# Patient Record
Sex: FEMALE | Race: WHITE | URBAN - METROPOLITAN AREA
[De-identification: names, ages, dates, MRNs, and addresses within clinical notes are randomized per-mention and may not be internally consistent; named-entity substitution may affect disease eponyms.]

---

## 2017-01-06 ENCOUNTER — OFFICE VISIT (OUTPATIENT)
Dept: FAMILY MEDICINE | Facility: CLINIC | Age: 67
End: 2017-01-06

## 2017-01-06 VITALS
OXYGEN SATURATION: 100 % | HEART RATE: 87 BPM | SYSTOLIC BLOOD PRESSURE: 128 MMHG | DIASTOLIC BLOOD PRESSURE: 76 MMHG | WEIGHT: 164 LBS | HEIGHT: 67 IN | BODY MASS INDEX: 25.74 KG/M2

## 2017-01-06 DIAGNOSIS — E78.5 HYPERLIPIDEMIA LDL GOAL <160: ICD-10-CM

## 2017-01-06 DIAGNOSIS — Z12.11 SCREENING FOR COLON CANCER: ICD-10-CM

## 2017-01-06 DIAGNOSIS — Z13.820 SCREENING FOR OSTEOPOROSIS: ICD-10-CM

## 2017-01-06 DIAGNOSIS — Z00.00 PREVENTATIVE HEALTH CARE: Primary | ICD-10-CM

## 2017-01-06 DIAGNOSIS — Z12.39 SCREENING FOR BREAST CANCER: ICD-10-CM

## 2017-01-06 DIAGNOSIS — Z11.59 NEED FOR HEPATITIS C SCREENING TEST: ICD-10-CM

## 2017-01-06 DIAGNOSIS — R87.610 ATYPICAL SQUAMOUS CELLS OF UNDETERMINED SIGNIFICANCE (ASCUS) ON PAPANICOLAOU SMEAR OF CERVIX: ICD-10-CM

## 2017-01-06 ASSESSMENT — PAIN SCALES - GENERAL: PAINLEVEL: NO PAIN (0)

## 2017-01-06 NOTE — NURSING NOTE
"    Chief Complaint   Patient presents with     Physical     66 year old      Blood pressure 128/76, pulse 87, height 5' 6.73\" (169.5 cm), weight 164 lb (74.39 kg), SpO2 100 %, not currently breastfeeding. Body mass index is 25.89 kg/(m^2).    Wt Readings from Last 2 Encounters:   01/06/17 164 lb (74.39 kg)   04/08/13 193 lb (87.544 kg)     BP Readings from Last 3 Encounters:   01/06/17 128/76   04/08/13 123/76   03/28/13 122/78       Patient Active Problem List   Diagnosis     Varicose veins     Snoring       Current Outpatient Prescriptions   Medication Sig Dispense Refill     Cholecalciferol (VITAMIN D PO) Take  by mouth. 5000 IU's daily       ibuprofen (ADVIL,MOTRIN) 800 MG tablet Take 1 tablet by mouth every 8 hours as needed for pain. 90 tablet 1       Social History   Substance Use Topics     Smoking status: Never Smoker      Smokeless tobacco: Never Used     Alcohol Use: Yes      Comment: 3 per week         Health Maintenance Due   Topic Date Due     ADVANCE DIRECTIVE PLANNING Q5 YRS (NO INBASKET)  04/11/1968     HEPATITIS C SCREENING  04/11/1968     MAMMO SCREEN Q2 YR (SYSTEM ASSIGNED)  04/11/1990     COLON CANCER SCREEN (SYSTEM ASSIGNED)  04/11/2000     FALL RISK ASSESSMENT  04/11/2015     DEXA SCAN SCREENING (SYSTEM ASSIGNED)  04/11/2015     PNEUMOCOCCAL (1 of 2 - PCV13) 04/11/2015     INFLUENZA VACCINE (SYSTEM ASSIGNED)  09/01/2016       CALEB MARTI CMA  January 6, 2017 2:57 PM      "

## 2017-01-06 NOTE — PATIENT INSTRUCTIONS
CALCIUM    Recommendations:  Teenagers, men and premenopausal women: 1200 mg/day  Pregnant and Lactating women: 1500 mg/day  Postmenopausal women on estrogen: 1200mg/day  Postmenopausal women not on estrogen: 1500 mg/day    If you are not eating dairy products you also need 1000 IU of vitamin D per day which can be obtained in either a multivitamin or in some of the Calcium tablets.    Dairy sources  Milk-  cow's              8 oz            300 mg  [place] milk             8 oz            450mg  Yogurt                      6 oz            300mg  Hard cheese                     1.5 oz          300 mg  Cottage cheese                  2 cup           300 mg  Low fat dairy sources are recommended    Non Dairy sources  OJ with calcium               1 cup           300mg  Total cereal                     1 cup           1000mg    Supplements:  Tums EX                         300 mg  Tums Ultra                      400 mg  Caltrate 600                    600 mg  Oscal                           500 mg  Oscal/D                         500 mg plus vitamin D  Viactive chews                  500mg each  Women's Formula Multivitamin    450 mg      Shingles vaccine  Please call you insurance carrier to see if shingles vaccine is covered.  If so, then ask about where to go to get the vaccine-pharmacy vs clinic.

## 2017-01-06 NOTE — MR AVS SNAPSHOT
After Visit Summary   1/6/2017    Rosa Larson    MRN: [id number]           Patient Information     Date Of Birth          1950        Visit Information        Provider Department      1/6/2017 3:00 PM Thi Harvey MD [facility]        Today's Diagnoses     Preventative health care    -  1     Screening for breast cancer         Need for hepatitis C screening test         Screening for colon cancer         Screening for osteoporosis         Atypical squamous cells of undetermined significance (ASCUS) on Papanicolaou smear of cervix         Hyperlipidemia LDL goal <160           Care Instructions    CALCIUM    Recommendations:  Teenagers, men and premenopausal women: 1200 mg/day  Pregnant and Lactating women: 1500 mg/day  Postmenopausal women on estrogen: 1200mg/day  Postmenopausal women not on estrogen: 1500 mg/day    If you are not eating dairy products you also need 1000 IU of vitamin D per day which can be obtained in either a multivitamin or in some of the Calcium tablets.    Dairy sources  Milk-  cow's              8 oz            300 mg  [place] milk             8 oz            450mg  Yogurt                      6 oz            300mg  Hard cheese                     1.5 oz          300 mg  Cottage cheese                  2 cup           300 mg  Low fat dairy sources are recommended    Non Dairy sources  OJ with calcium               1 cup           300mg  Total cereal                     1 cup           1000mg    Supplements:  Tums EX                         300 mg  Tums Ultra                      400 mg  Caltrate 600                    600 mg  Oscal                           500 mg  Oscal/D                         500 mg plus vitamin D  Viactive chews                  500mg each  Women's Formula Multivitamin    450 mg      Shingles vaccine  Please call you insurance carrier to see if shingles vaccine is covered.  If so, then ask about where to go to get the  vaccine-pharmacy vs clinic.          Follow-ups after your visit        Additional Services     GASTROENTEROLOGY ADULT REFERRAL +/- PROCEDURE       Your provider has referred you to Gastroenterology Services.    English    Procedure/Referral: PROCEDURE ONLY - COLONOSCOPY - Reason for procedure: Screening  [facility]: Gastroenterology Clinic [facility] [phone]   [email or web address] . Please be aware that coverage of these services is subject to the terms and limitations of your health insurance plan.  Call member services at your health plan with any benefit or coverage questions.  Any procedures must be performed at a [facility] facility OR coordinated by your clinic's referral office.    Please bring the following with you to your appointment:    (1) Any X-Rays, CTs or MRIs which have been performed.  Contact the facility where they were done to arrange for  prior to your scheduled appointment.    (2) List of current medications   (3) This referral request   (4) Any documents/labs given to you for this referral                  Future tests that were ordered for you today     Open Future Orders        Priority Expected Expires Ordered    Mammogram - routine screening Routine  12/27/2017 1/6/2017    Hepatitis C Screen Reflex to HCV RNA Quant and Genotype Routine 1/7/2017 1/6/2018 1/6/2017    Dexa hip/pelvis/spine* Routine  12/27/2017 1/6/2017    Lipid Panel ([place]) Routine 1/7/2017 1/6/2018 1/6/2017            Who to contact     Please call your clinic at [phone] to:    Ask questions about your health    Make or cancel appointments    Discuss your medicines    Learn about your test results    Speak to your doctor   If you have compliments or concerns about an experience at your clinic, or if you wish to file a complaint, please contact [facility] Patient Relations at [phone] or email us at [email or web address]    "      Additional Information About Your Visit        "[employer]"hart Information     [employer] gives you secure access to your electronic health record. If you see a primary care provider, you can also send messages to your care team and make appointments. If you have questions, please call your primary care clinic.  If you do not have a primary care provider, please call [phone] and they will assist you.      [employer] is an electronic gateway that provides easy, online access to your medical records. With [employer], you can request a clinic appointment, read your test results, renew a prescription or communicate with your care team.     To access your existing account, please contact your [facility] or call [phone] for assistance.        Care EveryWhere ID     This is your Care EveryWhere ID. This could be used by other organizations to access your [place] medical records  [id number]        Your Vitals Were     Pulse Height BMI (Body Mass Index) Pulse Oximetry          87 5' 6.73\" (169.5 cm) 25.89 kg/m2 100%         Blood Pressure from Last 3 Encounters:   01/06/17 128/76   04/08/13 123/76   03/28/13 122/78    Weight from Last 3 Encounters:   01/06/17 164 lb (74.39 kg)   04/08/13 193 lb (87.544 kg)   03/28/13 193 lb (87.544 kg)              We Performed the Following     GASTROENTEROLOGY ADULT REFERRAL +/- PROCEDURE     Pap imaged thin layer diagnostic with HPV (select HPV order below)        Primary Care Provider Office Phone # Fax #    Thi Harvey [phone]       [address] [address]        Thank you!     Thank you for choosing [facility]  for your care. Our goal is always to provide you with excellent care. Hearing back from our patients is one way we can continue to improve our services. Please take a few minutes to complete the written survey that you may receive in the mail after your visit with us. Thank " you!             Your Updated Medication List - Protect others around you: Learn how to safely use, store and throw away your medicines at www.disposemymeds.org.      Notice  As of 1/6/2017  3:57 PM    You have not been prescribed any medications.

## 2017-01-06 NOTE — PROGRESS NOTES
Rosa Larson is here for a general check up. She is not fasting. She would like to return for fasting labs. . She is up to date on eye exams and dental visits. Wears seat belt-yes. Bike helmet- na.   Concerns today:    GREG Lee is a healthy 65 yo woman, here for a check up.  She has a history of hyperlipidemia but is not currently on medications, however she has lost a lot of weight since her last visit. She is due for pap, mammogram, DEXA and colonoscopy. She is due for Prevnar vaccine but will return for that. Will also return for fasting labs.     Diet: healthy eater, legumes, fruits, veggies, chicken  She has been losing weight using Weight watchers  Wt Readings from Last 2 Encounters:   01/06/17 164 lb (74.39 kg)   04/08/13 193 lb (87.544 kg)     Osteoarthritis  She is doing much better after having lost weight.     Varicose veins  Rosa had surgery for varicose veins about 20 yrs ago. With prolonged standing, her legs feel full. She is interested in referral to [facility].     Hx of abnormal pap  Rosa had ASCUS on pap in 2013, HPV also positive then. She was referred for colposcopy. 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 She denies current spotting.     Advance directive: yes, she will bring in  Hearing concerns: no concerns  Fall Risk: none  Independent at home: yes  Safe : yes  Memory concerns: none    Six Item Cognitive Impairment Test   (6CIT):      What year is it?                               Correct - 0 points    What month is it?                               Correct - 0 points      Give the patient an address to remember with five components:   Prakash Vuong ( first and last name - 2 components)   [address]  (number and name of street - 2 components)   [place] ( city - 1 component)      About what time is it (within the hour)? Correct - 0 points    Count backwards from 20 to 1:   Correct - 0  "points    Say the months of the year in reverse: Correct - 0 points    Repeat the address phrase:   Correct - 0 points    Total 6CIT Score:      0/28    Interpretation: The 6CIT uses an inverse score and questions are weighted to produce a total out of 28. Scores of 0-7 are considered normal and 8 or more significant.    Advantages The test has high sensitivity without compromising specificity even in mild dementia. It is easy to translate linguistically and culturally.  Disadvantages The main disadvantage is in the scoring and weighting of the test, which is initially confusing, however computer models have simplified this greatly.    Probability Statistics: At the 7/8 cut off: Overall figures sensitivity 90% specificity 100%, in mild dementia sensitivity = 78% , specificity = 100%    Copyright 2000 [facility], [facility], . Courtesy of Dr. Racihd Gilman        Health Maintenance   Topic Date Due     ADVANCE DIRECTIVE PLANNING Q5 YRS (NO INBASKET)  04/11/1968     HEPATITIS C SCREENING  04/11/1968     MAMMO SCREEN Q2 YR (SYSTEM ASSIGNED)  04/11/1990     COLON CANCER SCREEN (SYSTEM ASSIGNED)  04/11/2000     FALL RISK ASSESSMENT  04/11/2015     DEXA SCAN SCREENING (SYSTEM ASSIGNED)  04/11/2015     PNEUMOCOCCAL (1 of 2 - PCV13) 04/11/2015     INFLUENZA VACCINE (SYSTEM ASSIGNED)  09/01/2016     LIPID SCREEN Q5 YR FEMALE (SYSTEM ASSIGNED)  03/05/2018     TETANUS IMMUNIZATION (SYSTEM ASSIGNED)  01/11/2023         Patient Active Problem List   Diagnosis     Varicose veins     Snoring       Past Surgical History   Procedure Laterality Date     Strip vein  1992       Family History   Problem Relation Age of Onset     HEART DISEASE Mother 95     Hypertension Father        Social  , spouse Zev  No children  Works in commercial design    HABITS:  Tob: none   ETOH: 2/week  Calcium: 1/day, no osteoporosis  Previous DEXA \"negative\" -last checked 12 yr ago  Caffeine: 4 decaf/day  Exercise: 3x per " "week    OB/GYN HISTORY:  LMP: menopause age 55, no HRT, no vaginal bleeding  Hx abnormal pap?  ASCUS with +HPV in 2013  STD hx?  none  Vasomotor sx:  none  G 0   P 0   A 0  Self Breast exam:  yes    Current Outpatient Prescriptions   Medication Sig Dispense Refill     Cholecalciferol (VITAMIN D PO) Take  by mouth. 5000 IU's daily       ibuprofen (ADVIL,MOTRIN) 800 MG tablet Take 1 tablet by mouth every 8 hours as needed for pain. 90 tablet 1     No Known Allergies      ROS  CONSTITUTIONAL:NEGATIVE for fever, chills, concerted weight loss  INTEGUMENTARY/SKIN: NEGATIVE for worrisome rashes, moles or lesions  EYES: NEGATIVE for vision changes or irritation  ENT/MOUTH: NEGATIVE for ear, mouth and throat problems  RESP:NEGATIVE for significant cough or SOB  BREAST: NEGATIVE for masses, tenderness or discharge  CV: NEGATIVE for chest pain, palpitations, [place], orthopnea, PND  or peripheral edema  GI: NEGATIVE for nausea, abdominal pain, heartburn, or change in bowel habits  :NEGATIVE for frequency, dysuria, or hematuria  MUSCULOSKELETAL:NEGATIVE for significant arthralgias or myalgia, some osteoarthritis  NEURO: NEGATIVE for weakness, dizziness or paresthesias  ENDOCRINE: NEGATIVE for polyuria/dipsia,  temperature intolerance, skin/hair changes  HEME/ALLERGY/IMMUNE: NEGATIVE for bleeding problems  PSYCHIATRIC: NEGATIVE for changes in mood or affect    EXAM  /76 mmHg  Pulse 87  Ht 5' 6.73\" (169.5 cm)  Wt 164 lb (74.39 kg)  BMI 25.89 kg/m2  SpO2 100%  GENERAL APPEARANCE: Alert, pleasant, NAD  EYES: PERRL, EOMI, conjunctiva clear  HENT: TM normal bilaterally. Nose and mouth without lesions  NECK: no adenopathy, thyroid normal to palpation  RESP: lungs clear to auscultation bilaterally,  BREAST: normal without masses, no tenderness or nipple discharge and no palpable  axillary masses or adenopathy   CV: regular rate and rhythm, normal S1 S2, no murmur, no carotid bruits  ABDOMEN: soft, nontender, without HSM or " masses. Bowel sounds normal  : External genitalia without lesions,tissues are atrophic, pale. Cervix pale but pap easily obtained, no abnormal discharge, bimanual exam without adnexal masses or tenderness, uterus non enlarged  RECTAL EXAM: not done   MS: extremities normal- no gross deformities noted, no tender, hot or swollen joints.    SKIN: no suspicious lesions or rashes  NEURO: Normal strength and tone, sensory exam grossly normal, DTR normoreflexive in upper and lower extremities  PSYCH: mentation appears normal. and affect normal/bright.  EXT: no peripheral edema, pedal pulses palpable. Varicose veins noted at lower extremities, bilaterally    Assessment:  (Z00.00) Preventative health care  (primary encounter diagnosis)  Comment: healthy woman  Plan: Hepatitis C Screen Reflex to HCV RNA Quant and         Genotype, GASTROENTEROLOGY ADULT REFERRAL +/-         PROCEDURE, Dexa hip/pelvis/spine*, Lipid Panel         ([place]), HPV High Risk Types DNA Cervical        Anticipatory guidance given today regarding diet, exercise, calcium intake.    (Z12.39) Screening for breast cancer  Comment: due for routine mammogram, normal exam  Plan: Mammogram - routine screening        Refer to breast center    (Z11.59) Need for hepatitis C screening test  Comment: routine screening  Plan: Hepatitis C Screen Reflex to HCV RNA Quant and         Genotype    (Z12.11) Screening for colon cancer  Comment: due for routine screening  Plan: GASTROENTEROLOGY ADULT REFERRAL +/- PROCEDURE        Referral is given    (Z13.820) Screening for osteoporosis  Comment: due for DEXA  Plan: Dexa hip/pelvis/spine*        Discussed adequate calcium and D intake    (R87.610) Atypical squamous cells of undetermined significance (ASCUS) on Papanicolaou smear of cervix  Comment: history of abnormal pap  Plan: Pap imaged thin layer diagnostic with HPV         (select HPV order below)        Await test results.   Addendum: she has normal pap but + high  risk (other ) HPV  Recommendations are to repeat pap and HPV testing in one year.    (E78.5) Hyperlipidemia LDL goal <160  Comment: she is not fasting today  Plan: will return for labs in the next week.    Thi Harvey MD  Internal Medicine/Pediatrics

## 2017-01-12 ENCOUNTER — ALLIED HEALTH/NURSE VISIT (OUTPATIENT)
Dept: FAMILY MEDICINE | Facility: CLINIC | Age: 67
End: 2017-01-12

## 2017-01-12 DIAGNOSIS — Z11.59 NEED FOR HEPATITIS C SCREENING TEST: ICD-10-CM

## 2017-01-12 DIAGNOSIS — Z23 IMMUNIZATION DUE: Primary | ICD-10-CM

## 2017-01-12 DIAGNOSIS — Z00.00 PREVENTATIVE HEALTH CARE: ICD-10-CM

## 2017-01-12 LAB
CHOLEST SERPL-MCNC: 222 MG/DL
COPATH REPORT: NORMAL
HCV AB SERPL QL IA: NORMAL
HDLC SERPL-MCNC: 71 MG/DL
LDLC SERPL CALC-MCNC: 132 MG/DL
NONHDLC SERPL-MCNC: 151 MG/DL
PAP: NORMAL
TRIGL SERPL-MCNC: 96 MG/DL

## 2017-01-13 LAB
FINAL DIAGNOSIS: ABNORMAL
HPV HR 12 DNA CVX QL NAA+PROBE: POSITIVE
HPV16 DNA SPEC QL NAA+PROBE: NEGATIVE
HPV18 DNA SPEC QL NAA+PROBE: NEGATIVE
SPECIMEN DESCRIPTION: ABNORMAL

## 2017-01-19 ENCOUNTER — ALLIED HEALTH/NURSE VISIT (OUTPATIENT)
Dept: FAMILY MEDICINE | Facility: CLINIC | Age: 67
End: 2017-01-19

## 2017-01-19 DIAGNOSIS — Z23 IMMUNIZATION DUE: Primary | ICD-10-CM

## 2017-01-19 NOTE — NURSING NOTE
Screening Questionnaire for Adult Immunization    Are you sick today?   No   Do you have allergies to medications, food, a vaccine component or latex?   No   Have you ever had a serious reaction after receiving a vaccination?   No   Do you have a long-term health problem with heart disease, lung disease, asthma, kidney disease, metabolic disease (e.g. diabetes), anemia, or other blood disorder?   No   Do you have cancer, leukemia, HIV/AIDS, or any other immune system problem?   No   In the past 3 months, have you taken medications that affect  your immune system, such as prednisone, other steroids, or anticancer drugs; drugs for the treatment of rheumatoid arthritis, Crohn s disease, or psoriasis; or have you had radiation treatments?   No   Have you had a seizure, or a brain or other nervous system problem?   No   During the past year, have you received a transfusion of blood or blood     products, or been given immune (gamma) globulin or antiviral drug?   No   For women: Are you pregnant or is there a chance you could become        pregnant during the next month?   No   Have you received any vaccinations in the past 4 weeks?   No     Immunization questionnaire answers were all negative.          Screening performed by CALEB MARTI on 1/19/2017 at 11:39 AM.

## 2017-04-10 ENCOUNTER — TRANSFERRED RECORDS (OUTPATIENT)
Dept: HEALTH INFORMATION MANAGEMENT | Facility: CLINIC | Age: 67
End: 2017-04-10

## 2017-08-29 DIAGNOSIS — M17.12 ARTHRITIS OF LEFT KNEE: Primary | ICD-10-CM

## 2017-12-26 ENCOUNTER — TRANSFERRED RECORDS (OUTPATIENT)
Dept: HEALTH INFORMATION MANAGEMENT | Facility: CLINIC | Age: 67
End: 2017-12-26

## 2018-02-10 ENCOUNTER — HEALTH MAINTENANCE LETTER (OUTPATIENT)
Age: 68
End: 2018-02-10

## 2018-03-10 ENCOUNTER — HEALTH MAINTENANCE LETTER (OUTPATIENT)
Age: 68
End: 2018-03-10

## 2018-03-21 ENCOUNTER — TRANSFERRED RECORDS (OUTPATIENT)
Dept: HEALTH INFORMATION MANAGEMENT | Facility: CLINIC | Age: 68
End: 2018-03-21

## 2018-03-28 NOTE — PROGRESS NOTES
[facility]  [address], [address]  [place] [address]  [phone]  Dept: [phone]    PRE-OP EVALUATION:  Today's date: 2018    Rosa Larson (: 1950) presents for pre-operative evaluation assessment as requested by Dr. Eliud Andre.  She requires evaluation and anesthesia risk assessment prior to undergoing surgery/procedure for treatment of Left knee pain. Procedure: Total left knee replacement .    Proposed Surgery/ Procedure: Left Total Knee replacement  Date of Surgery/ Procedure: 2018  Time of Surgery/ Procedure: [facility]  Hospital/Surgical Facility: [facility]  Fax number for surgical facility: [phone]  Primary Physician: Thi Harvey  Type of Anesthesia Anticipated: to be determined    Patient has a Health Care Directive or Living Will:  NO    1. NO - Do you have a history of heart attack, stroke, stent, bypass or surgery on an artery in the head, neck, heart or legs?  2. NO - Do you ever have any pain or discomfort in your chest?  3. NO - Do you have a history of  Heart Failure?  4. NO - Are you troubled by shortness of breath when: walking on the level, up a slight hill or at night?  5. NO - Do you currently have a cold, bronchitis or other respiratory infection?  6. NO - Do you have a cough, shortness of breath or wheezing?  7. NO - Do you sometimes get pains in the calves of your legs when you walk?  8. Yes - Do you or anyone in your family have previous history of blood clots? mother  9. NO - Do you or does anyone in your family have a serious bleeding problem such as prolonged bleeding following surgeries or cuts?  10. NO - Have you ever had problems with anemia or been told to take iron pills?  11. NO - Have you had any abnormal blood loss such as black, tarry or bloody stools, or abnormal vaginal bleeding?  12. NO - Have you ever had a blood transfusion?  13. Yes - Have you or any of your relatives ever had  "problems with anesthesia? Sister gets nauseated  14. NO - Do you have sleep apnea, excessive snoring or daytime drowsiness?  15. NO - Do you have any prosthetic heart valves?  16. NO - Do you have prosthetic joints?  17. NO - Is there any chance that you may be pregnant?      HPI:   Preoperative assessment  Rosa Larson is a 66 yo woman here for preoperative assessment. She is to undergo total LEFT knee arthroplasty due to persistent pain secondary to osteoarthritis. She has pain in the medial compartment. Minimal swelling. Has morning stiffness, pain, causing her to limp. Using ibuprofen for pain management.     Rosa is a healthy individual, without history of heart dx,lung dx, hypertension or DM. Nonsmoker. No hx of sleep apnea. No personal or history of bleeding or clotting disorders. Her mother had varicose veins and hx of a clot. Otherwise, no affected siblings.  No personal or family history of intolerance to anesthesia. She has never had surgery.           MEDICAL HISTORY:     Patient Active Problem List    Diagnosis Date Noted     Varicose veins 02/27/2013     Priority: Medium     Snoring 02/27/2013     Priority: Medium      No past medical history on file.  Past Surgical History:   Procedure Laterality Date     STRIP VEIN  1992     No current outpatient prescriptions on file.     OTC products: Vitamin C and D    No Known Allergies   Latex Allergy: NO    Social History   Substance Use Topics     Smoking status: Never Smoker     Smokeless tobacco: Never Used     Alcohol use Yes      Comment: 3 per week     History   Drug Use No       REVIEW OF SYSTEMS:   Constitutional, neuro, ENT, endocrine, pulmonary, cardiac, gastrointestinal, genitourinary, musculoskeletal, integument and psychiatric systems are negative, except as otherwise noted.    EXAM:   /81 (BP Location: Right arm, Patient Position: Sitting, Cuff Size: Adult Large)  Pulse 86  Temp 98.4  F (36.9  C) (Oral)  Ht 5' 7.5\" (171.5 cm)  Wt 176 " lb 0.6 oz (79.9 kg)  SpO2 98%  BMI 27.16 kg/m2    GENERAL APPEARANCE: healthy, alert and no distress     EYES: EOMI, PERRL     HENT: ear canals and TM's normal and nose and mouth without ulcers or lesions     NECK: no adenopathy, no asymmetry, masses, or scars and thyroid normal to palpation     RESP: lungs clear to auscultation - no rales, rhonchi or wheezes     CV: regular rates and rhythm, normal S1 S2, no S3 or S4 and no murmur, click or rub     ABDOMEN:  soft, nontender, no HSM or masses and bowel sounds normal     MS: extremities normal- no gross deformities noted, no evidence of inflammation in joints, FROM in all extremities.     SKIN: no suspicious lesions or rashes     NEURO: Normal strength and tone, sensory exam grossly normal, mentation intact and speech normal     PSYCH: mentation appears normal. and affect normal/bright     LYMPHATICS: No cervical adenopathy    DIAGNOSTICS:   EKG: appears normal, NSR, rate 79, normal axis, normal intervals, no acute ST/T changes c/w ischemia, no LVH by voltage criteria. Thi Harvey MD    Results for orders placed or performed in visit on 04/09/18   CBC with platelets   Result Value Ref Range    WBC 6.6 4.0 - 11.0 10e9/L    RBC Count 4.25 3.8 - 5.2 10e12/L    Hemoglobin 13.6 11.7 - 15.7 g/dL    Hematocrit 40.7 35.0 - 47.0 %    MCV 96 78 - 100 fl    MCH 32.0 26.5 - 33.0 pg    MCHC 33.4 31.5 - 36.5 g/dL    RDW 12.6 10.0 - 15.0 %    Platelet Count 284 150 - 450 10e9/L   Urinalysis ([place])   Result Value Ref Range    Specific Gravity Urine 1.020 1.005 - 1.030    pH Urine 7.0 4.5 - 8.0    Leukocyte Esterase UR Trace (A) Negative    Nitrite Urine Negative Negative    Protein UR Negative Negative    Glucose Urine Negative Negative    Ketones Urine Negative Negative    Urobilinogen mg/dL 0.2 E.U./dL 0.2 E.U./dL    Bilirubin UR Negative Negative    Blood UR Trace-intact (A) Negative   INR   Result Value Ref Range    INR 1.03 0.86 - 1.14   Urine Culture Aerobic  Bacterial   Result Value Ref Range    Specimen Description Unspecified Urine     Culture Micro       10,000 to 50,000 colonies/mL  mixed urogenital radha      Culture Micro Susceptibility testing not routinely done      IMPRESSION:   (Z01.818) Preop general physical exam  (primary encounter diagnosis)  Comment: elective total left knee replacement  Plan: EKG 12-lead complete w/read - Clinics, CBC with        platelets, Urinalysis ([place]), Urine         Culture Aerobic Bacterial, ALPRAZolam (XANAX)         0.25 MG tablet, INR, CANCELED: INR ([place])        No contraindication to surgery. Proceed as planned      The proposed surgical procedure is considered INTERMEDIATE risk.    REVISED CARDIAC RISK INDEX  The patient has the following serious cardiovascular risks for perioperative complications such as (MI, PE, VFib and 3  AV Block):  No serious cardiac risks  INTERPRETATION: 0 risks: Class I (very low risk - 0.4% complication rate)    The patient has the following additional risks for perioperative complications:  No identified additional risks      ICD-10-CM    1. Preop general physical exam Z01.818        RECOMMENDATIONS:         --Patient is to avoid ASA 10 days prior to surgery, avoid NSAIDS 3 days prior to surgery. No vitamins, herbs, supplements, 3 days prior to surgery    APPROVAL GIVEN to proceed with proposed procedure, without further diagnostic evaluation       Signed Electronically by: Thi Harvey MD    Copy of this evaluation report is provided to requesting physician.    [facility] Preop Guidelines

## 2018-04-09 ENCOUNTER — OFFICE VISIT (OUTPATIENT)
Dept: FAMILY MEDICINE | Facility: CLINIC | Age: 68
End: 2018-04-09
Payer: COMMERCIAL

## 2018-04-09 VITALS
TEMPERATURE: 98.4 F | HEART RATE: 86 BPM | OXYGEN SATURATION: 98 % | BODY MASS INDEX: 26.68 KG/M2 | DIASTOLIC BLOOD PRESSURE: 81 MMHG | WEIGHT: 176.04 LBS | HEIGHT: 68 IN | SYSTOLIC BLOOD PRESSURE: 119 MMHG

## 2018-04-09 DIAGNOSIS — Z01.818 PREOP GENERAL PHYSICAL EXAM: Primary | ICD-10-CM

## 2018-04-09 LAB
BILIRUBIN UR: NEGATIVE
BLOOD UR: ABNORMAL
ERYTHROCYTE [DISTWIDTH] IN BLOOD BY AUTOMATED COUNT: 12.6 % (ref 10–15)
GLUCOSE URINE: NEGATIVE
HCT VFR BLD AUTO: 40.7 % (ref 35–47)
HGB BLD-MCNC: 13.6 G/DL (ref 11.7–15.7)
INR PPP: 1.03 (ref 0.86–1.14)
KETONES UR QL: NEGATIVE
LEUKOCYTE ESTERASE UR: ABNORMAL
MCH RBC QN AUTO: 32 PG (ref 26.5–33)
MCHC RBC AUTO-ENTMCNC: 33.4 G/DL (ref 31.5–36.5)
MCV RBC AUTO: 96 FL (ref 78–100)
NITRITE UR QL STRIP: NEGATIVE
PH UR STRIP: 7 [PH] (ref 5–7)
PLATELET # BLD AUTO: 284 10E9/L (ref 150–450)
PROTEIN UR: NEGATIVE
RBC # BLD AUTO: 4.25 10E12/L (ref 3.8–5.2)
SP GR UR STRIP: 1.02
UROBILINOGEN UR STRIP-ACNC: ABNORMAL
WBC # BLD AUTO: 6.6 10E9/L (ref 4–11)

## 2018-04-09 RX ORDER — ALPRAZOLAM 0.25 MG
TABLET ORAL
Qty: 6 TABLET | Refills: 0 | Status: SHIPPED | OUTPATIENT
Start: 2018-04-09 | End: 2018-11-28

## 2018-04-09 ASSESSMENT — PAIN SCALES - GENERAL: PAINLEVEL: MODERATE PAIN (4)

## 2018-04-10 LAB
BACTERIA SPEC CULT: NORMAL
BACTERIA SPEC CULT: NORMAL
SPECIMEN SOURCE: NORMAL

## 2018-05-02 ENCOUNTER — TRANSFERRED RECORDS (OUTPATIENT)
Dept: HEALTH INFORMATION MANAGEMENT | Facility: CLINIC | Age: 68
End: 2018-05-02

## 2018-05-30 ENCOUNTER — TRANSFERRED RECORDS (OUTPATIENT)
Dept: HEALTH INFORMATION MANAGEMENT | Facility: CLINIC | Age: 68
End: 2018-05-30

## 2018-11-23 NOTE — PATIENT INSTRUCTIONS
Shingles vaccine--Shingrix  Please call you insurance carrier to see if shingles vaccine is covered.  Copay?  If so, then ask about where to go to get the vaccine-pharmacy vs clinic.    Preventive Health Recommendations    See your health care provider every year to    Review health changes.     Discuss preventive care.      Review your medicines if your doctor has prescribed any.      You no longer need a yearly Pap test unless you've had an abnormal Pap test in the past 10 years. If you have vaginal symptoms, such as bleeding or discharge, be sure to talk with your provider about a Pap test.      Every 1 to 2 years, have a mammogram.  If you are over 69, talk with your health care provider about whether or not you want to continue having screening mammograms.      Every 10 years, have a colonoscopy. Or, have a yearly FIT test (stool test). These exams will check for colon cancer.       Have a cholesterol test every 5 years, or more often if your doctor advises it.       Have a diabetes test (fasting glucose) every three years. If you are at risk for diabetes, you should have this test more often.       At age 65, have a bone density scan (DEXA) to check for osteoporosis (brittle bone disease).    Shots:    Get a flu shot each year.    Get a tetanus shot every 10 years.    Talk to your doctor about your pneumonia vaccines. There are now two you should receive - Pneumovax (PPSV 23) and Prevnar (PCV 13).    Talk to your pharmacist about the shingles vaccine.    Talk to your doctor about the hepatitis B vaccine.    Nutrition:     Eat at least 5 servings of fruits and vegetables each day.      Eat whole-grain bread, whole-wheat pasta and brown rice instead of white grains and rice.      Get adequate Calcium and Vitamin D.     Lifestyle    Exercise at least 150 minutes a week (30 minutes a day, 5 days a week). This will help you control your weight and prevent disease.      Limit alcohol to one drink per day.      No  smoking.       Wear sunscreen to prevent skin cancer.       See your dentist twice a year for an exam and cleaning.      See your eye doctor every 1 to 2 years to screen for conditions such as glaucoma, macular degeneration and cataracts.    Personalized Prevention Plan  You are due for the preventive services outlined below.  Your care team is available to assist you in scheduling these services.  If you have already completed any of these items, please share that information with your care team to update in your medical record.  Health Maintenance Due   Topic Date Due     Colon Cancer Screening - every 10 years.  04/11/2000     Discuss Advance Directive Planning  04/11/2005     HPV test with Pap every year  01/06/2018     Pap Smear (diagnostic) - yearly  01/06/2018     Pneumococcal Vaccine (2 of 2 - PPSV23) 01/19/2018     Flu Vaccine (1) 09/01/2018

## 2018-11-28 ENCOUNTER — OFFICE VISIT (OUTPATIENT)
Dept: FAMILY MEDICINE | Facility: CLINIC | Age: 68
End: 2018-11-28
Payer: COMMERCIAL

## 2018-11-28 VITALS
TEMPERATURE: 98 F | DIASTOLIC BLOOD PRESSURE: 85 MMHG | HEIGHT: 68 IN | HEART RATE: 82 BPM | SYSTOLIC BLOOD PRESSURE: 143 MMHG | WEIGHT: 187.5 LBS | BODY MASS INDEX: 28.42 KG/M2 | OXYGEN SATURATION: 97 %

## 2018-11-28 DIAGNOSIS — Z23 IMMUNIZATION DUE: ICD-10-CM

## 2018-11-28 DIAGNOSIS — Z00.00 ANNUAL PHYSICAL EXAM: Primary | ICD-10-CM

## 2018-11-28 DIAGNOSIS — Z87.42 HISTORY OF ABNORMAL CERVICAL PAP SMEAR: ICD-10-CM

## 2018-11-28 DIAGNOSIS — Z12.31 VISIT FOR SCREENING MAMMOGRAM: ICD-10-CM

## 2018-11-28 DIAGNOSIS — Z78.0 MENOPAUSE: ICD-10-CM

## 2018-11-28 RX ORDER — ASCORBIC ACID 500 MG
TABLET ORAL
Qty: 30 TABLET | Refills: 11 | COMMUNITY
Start: 2018-11-28

## 2018-11-28 NOTE — PROGRESS NOTES
Rosa Larson is here for a general check up. She is not fasting. She is  up to date on eye exams and dental visits. Wears seat belt-yes. Bike helmet- yes.   Concerns today:    GREG Lee is a 69 yo woman here for a check up. She had a left knee replacement in the spring, she is doing well with recovery. She had her flu vaccine. She is due for  pneumococcal vaccine 23 today. She will contact her pharmacy for the Shingrix vaccine. She is due for pap today, denies vaginal bleeding. PAP January 2017 was HPV positive (Other HR HPV) and normal pap.  She did a FIT card through [employer] [facility] March 2018, results not in EMR. Mammogram due March 2019. Last mammogram March 2017 showed an epidermal inclusion/sebaceous cyst. DEXA January 2017, lowest T-score -1.4 at the level of the left femoral neck. Due for repeat DEXA January 2019.     Diet: She reports her summer was tough and she ate for comfort. Her  has chronic back pain. She also had left knee replacement. She is now back on weight watchers and exercising more.     Advance directive: No, has resources at home.   Hearing concerns: No concerns  Fall Risk: None  Independent at home: Yes  Safe : Yes  Memory concerns: No     COGNITIVE SCREEN  1) Repeat 3 items (Banana, Sunrise, Chair)    2) Clock draw: NORMAL  3) 3 item recall: Recalls 3 objects  Results: 3 items recalled: COGNITIVE IMPAIRMENT LESS LIKELY    Mini-CogTM Copyright SIMON Singer. Licensed by the author for use in [facility]; reprinted with permission ([email or web address]). All rights reserved.          Health Maintenance   Topic Date Due     COLON CANCER SCREEN (SYSTEM ASSIGNED)  04/11/2000     ADVANCE DIRECTIVE PLANNING Q5 YRS  04/11/2005     HPV Q1 Year  01/06/2018     PAP Q1 YR DIAGNOSTIC  01/06/2018     PNEUMOCOCCAL (2 of 2 - PPSV23) 01/19/2018     MAMMO SCREEN Q2 YR (SYSTEM ASSIGNED)  03/14/2019     FALL RISK ASSESSMENT  04/09/2019     PHQ-2 Q1 YR  04/09/2019     LIPID SCREEN Q5 YR FEMALE  (SYSTEM ASSIGNED)  01/12/2022     TETANUS IMMUNIZATION (SYSTEM ASSIGNED)  01/11/2023     DEXA SCAN SCREENING (SYSTEM ASSIGNED)  Completed     INFLUENZA VACCINE  Completed     HEPATITIS C SCREENING  Completed       Patient Active Problem List   Diagnosis     Varicose veins     Snoring       Past Surgical History:   Procedure Laterality Date     KNEE SURGERY Left 2018    total knee     STRIP VEIN  1992       Family History   Problem Relation Age of Onset     Heart Disease Mother 95     Deep Vein Thrombosis Mother      related to varicose vein     Hypertension Father      Vascular Disease Sister      Brain surgery, something to do with vein and blood vessel. headaches       Social  , spouse Zev  No children  Works in commercial design     HABITS:  Tob: none   ETOH: 2/week  Calcium: 1/day, taking vitamin D supplement 1000 IU.   Caffeine: 3-4 coffee per day  Exercise: 3x per week     OB/GYN HISTORY:  LMP: menopause age 55, no HRT, no vaginal bleeding  Hx abnormal pap?  ASCUS with +HPV in 2013  STD hx?  none  Vasomotor sx:  none  G 0   P 0   A 0  Self Breast exam:  yes    No current outpatient prescriptions on file.     No Known Allergies      ROS  CONSTITUTIONAL:NEGATIVE for fever, chills, change in weight  INTEGUMENTARY/SKIN: NEGATIVE for worrisome rashes, moles or lesions  EYES: NEGATIVE for vision changes or irritation  ENT/MOUTH: NEGATIVE for ear, mouth and throat problems  RESP:NEGATIVE for significant cough or SOB  BREAST: NEGATIVE for masses, tenderness or discharge  CV: NEGATIVE for chest pain, palpitations, B[place], orthopnea, PND  or peripheral edema  GI: NEGATIVE for nausea, abdominal pain, heartburn, or change in bowel habits  :NEGATIVE for frequency, dysuria, or hematuria  MUSCULOSKELETAL:NEGATIVE for significant arthralgias or myalgia. Hx of varicose veins.   NEURO: NEGATIVE for weakness, dizziness or paresthesias  ENDOCRINE: NEGATIVE for polyuria/dipsia,  temperature intolerance, skin/hair  "changes  HEME/ALLERGY/IMMUNE: NEGATIVE for bleeding problems  PSYCHIATRIC: NEGATIVE for changes in mood or affect    EXAM  /85  Pulse 82  Temp 98  F (36.7  C) (Oral)  Ht 5' 7.52\" (171.5 cm)  Wt 187 lb 8 oz (85 kg)  SpO2 97%  BMI 28.92 kg/m2  GENERAL APPEARANCE: Alert, pleasant, NAD  EYES: PERRL, EOMI, conjunctiva clear  HENT: TM normal bilaterally. Nose and mouth without lesions  NECK: no adenopathy, thyroid normal to palpation   RESP: lungs clear to auscultation bilaterally,   BREAST: normal without masses, no tenderness or nipple discharge and no palpable  axillary masses or adenopathy   CV: regular rate and rhythm, normal S1 S2, no murmur, no carotid bruits  ABDOMEN: soft, nontender, without HSM or masses. Bowel sounds normal  : External genitalia without lesions, mucosa and cervix atrophic, pale, no abnormal discharge, bimanual exam without adnexal masses or tenderness, uterus non enlarged   RECTAL EXAM: not done   MS: extremities normal- no gross deformities noted, no tender, hot or swollen joints.    SKIN: no suspicious lesions or rashes  NEURO: Normal strength and tone, sensory exam grossly normal, DTR normoreflexive in upper and lower extremities  PSYCH: mentation appears normal. and affect normal/bright.  EXT: no peripheral edema, pedal pulses palpable    Assessment:  (Z00.00) Annual physical exam  (primary encounter diagnosis)  Comment: 69 yo woman in good health.   Plan: Lipid Profile, CANCELED: Lipid Profile        Anticipatory guidance given today regarding diet, exercise, calcium intake. Up to date with vaccines, pneumococcal vaccine 23 given today. Discussed sensible approach to weight loss    (Z23) Immunization due  Comment: due for pneumovax 23 to complete the series  Plan: Pneumococcal vaccine 23 valent PPSV23          (Pneumovax) [98005], ADMIN MEDICARE:         Pneumococcal Vaccine ()        Given    (Z87.556) History of abnormal cervical Pap smear  Comment: history of abnormal " pap   Plan: Pap imaged thin layer diagnostic with HPV         (select HPV order below)        Await test results, will need 2 normal pap and 2 neg HPV tests to stop screening.   ADDENDUM. Pap and HPV are negative. Return in one year, of both negative, then can stop surveillance.    (Z12.31) Visit for screening mammogram  Comment: Routine  Plan: Mammogram - routine screening        Referral is given    (Z78.0) Menopause  Comment: Due for DEXA  Plan: Dexa hip/pelvis/spine*        Discussed adequate calcium and D intake    Thi Harvey MD  Internal Medicine/Pediatrics    I, Sidra Olea, am serving as a scribe to document services personally performed by Dr. Thi Harvey, based on data collection and the provider's statements to me. Dr. Harvey has reviewed, edited, and approved the above note.

## 2018-11-28 NOTE — NURSING NOTE
Screening Questionnaire for Adult Immunization    Are you sick today?   No   Do you have allergies to medications, food, a vaccine component or latex?   No   Have you ever had a serious reaction after receiving a vaccination?   No   Do you have a long-term health problem with heart disease, lung disease, asthma, kidney disease, metabolic disease (e.g. diabetes), anemia, or other blood disorder?   No   Do you have cancer, leukemia, HIV/AIDS, or any other immune system problem?   No   In the past 3 months, have you taken medications that affect  your immune system, such as prednisone, other steroids, or anticancer drugs; drugs for the treatment of rheumatoid arthritis, Crohn s disease, or psoriasis; or have you had radiation treatments?   No   Have you had a seizure, or a brain or other nervous system problem?   No   During the past year, have you received a transfusion of blood or blood     products, or been given immune (gamma) globulin or antiviral drug?   No   For women: Are you pregnant or is there a chance you could become        pregnant during the next month?   No   Have you received any vaccinations in the past 4 weeks?  No     Immunization questionnaire was negative for all answers       Per orders of Dr. Harvey, injection of Pneuovax 23 given by Shonda Bhatti. Patient instructed to remain in clinic for 15 minutes afterwards, and to report any adverse reaction to me immediately.       Screening performed by Shonda Bhatti on 11/28/2018 at 1:50 PM.

## 2018-11-28 NOTE — NURSING NOTE
"68 year old  Chief Complaint   Patient presents with     Physical     no concerns       Blood pressure 143/85, pulse 82, temperature 98  F (36.7  C), temperature source Oral, height 5' 7.52\" (171.5 cm), weight 187 lb 8 oz (85 kg), SpO2 97 %, not currently breastfeeding. Body mass index is 28.92 kg/(m^2).  Patient Active Problem List   Diagnosis     Varicose veins     Snoring       Wt Readings from Last 2 Encounters:   11/28/18 187 lb 8 oz (85 kg)   04/09/18 176 lb 0.6 oz (79.9 kg)     BP Readings from Last 3 Encounters:   11/28/18 143/85   04/09/18 119/81   01/06/17 128/76         Current Outpatient Prescriptions   Medication     ALPRAZolam (XANAX) 0.25 MG tablet     No current facility-administered medications for this visit.        Social History   Substance Use Topics     Smoking status: Never Smoker     Smokeless tobacco: Never Used     Alcohol use Yes      Comment: 3 per week       Health Maintenance Due   Topic Date Due     COLON CANCER SCREEN (SYSTEM ASSIGNED)  04/11/2000     ADVANCE DIRECTIVE PLANNING Q5 YRS  04/11/2005     HPV Q1 Year  01/06/2018     PAP Q1 YR DIAGNOSTIC  01/06/2018     PNEUMOCOCCAL (2 of 2 - PPSV23) 01/19/2018       Lab Results   Component Value Date    PAP NIL 01/06/2017 November 28, 2018 1:01 PM  "

## 2018-11-28 NOTE — MR AVS SNAPSHOT
After Visit Summary   11/28/2018    Rosa Larson    MRN: [id number]           Patient Information     Date Of Birth          1950        Visit Information        Provider Department      11/28/2018 1:00 PM Thi Harvey MD [facility]        Today's Diagnoses     Annual physical exam    -  1    Immunization due        History of abnormal cervical Pap smear        Visit for screening mammogram        Menopause          Care Instructions    Shingles vaccine--Shingrix  Please call you insurance carrier to see if shingles vaccine is covered.  Copay?  If so, then ask about where to go to get the vaccine-pharmacy vs clinic.    Preventive Health Recommendations    See your health care provider every year to    Review health changes.     Discuss preventive care.      Review your medicines if your doctor has prescribed any.      You no longer need a yearly Pap test unless you've had an abnormal Pap test in the past 10 years. If you have vaginal symptoms, such as bleeding or discharge, be sure to talk with your provider about a Pap test.      Every 1 to 2 years, have a mammogram.  If you are over 69, talk with your health care provider about whether or not you want to continue having screening mammograms.      Every 10 years, have a colonoscopy. Or, have a yearly FIT test (stool test). These exams will check for colon cancer.       Have a cholesterol test every 5 years, or more often if your doctor advises it.       Have a diabetes test (fasting glucose) every three years. If you are at risk for diabetes, you should have this test more often.       At age 65, have a bone density scan (DEXA) to check for osteoporosis (brittle bone disease).    Shots:    Get a flu shot each year.    Get a tetanus shot every 10 years.    Talk to your doctor about your pneumonia vaccines. There are now two you should receive - Pneumovax (PPSV 23) and Prevnar (PCV 13).    Talk to your pharmacist about the  shingles vaccine.    Talk to your doctor about the hepatitis B vaccine.    Nutrition:     Eat at least 5 servings of fruits and vegetables each day.      Eat whole-grain bread, whole-wheat pasta and brown rice instead of white grains and rice.      Get adequate Calcium and Vitamin D.     Lifestyle    Exercise at least 150 minutes a week (30 minutes a day, 5 days a week). This will help you control your weight and prevent disease.      Limit alcohol to one drink per day.      No smoking.       Wear sunscreen to prevent skin cancer.       See your dentist twice a year for an exam and cleaning.      See your eye doctor every 1 to 2 years to screen for conditions such as glaucoma, macular degeneration and cataracts.    Personalized Prevention Plan  You are due for the preventive services outlined below.  Your care team is available to assist you in scheduling these services.  If you have already completed any of these items, please share that information with your care team to update in your medical record.  Health Maintenance Due   Topic Date Due     Colon Cancer Screening - every 10 years.  04/11/2000     Discuss Advance Directive Planning  04/11/2005     HPV test with Pap every year  01/06/2018     Pap Smear (diagnostic) - yearly  01/06/2018     Pneumococcal Vaccine (2 of 2 - PPSV23) 01/19/2018     Flu Vaccine (1) 09/01/2018             Follow-ups after your visit        Future tests that were ordered for you today     Open Future Orders        Priority Expected Expires Ordered    Dexa hip/pelvis/spine* Routine  11/28/2019 11/28/2018    Lipid Profile Routine 11/29/2018 11/28/2019 11/28/2018    Mammogram - routine screening Routine  11/28/2019 11/28/2018            Who to contact     Please call your clinic at [phone] to:    Ask questions about your health    Make or cancel appointments    Discuss your medicines    Learn about your test results    Speak to your doctor            Additional Information About Your  "Visit        [employer] Information     [employer] gives you secure access to your electronic health record. If you see a primary care provider, you can also send messages to your care team and make appointments. If you have questions, please call your primary care clinic.  If you do not have a primary care provider, please call [phone] and they will assist you.      [employer] is an electronic gateway that provides easy, online access to your medical records. With [employer], you can request a clinic appointment, read your test results, renew a prescription or communicate with your care team.     To access your existing account, please contact your [facility] or call [phone] for assistance.        Care EveryWhere ID     This is your Care EveryWhere ID. This could be used by other organizations to access your [facility] medical records  [id number]        Your Vitals Were     Pulse Temperature Height Pulse Oximetry BMI (Body Mass Index)       82 98  F (36.7  C) (Oral) 5' 7.52\" (171.5 cm) 97% 28.92 kg/m2        Blood Pressure from Last 3 Encounters:   11/28/18 143/85   04/09/18 119/81   01/06/17 128/76    Weight from Last 3 Encounters:   11/28/18 187 lb 8 oz (85 kg)   04/09/18 176 lb 0.6 oz (79.9 kg)   01/06/17 164 lb (74.4 kg)              We Performed the Following     ADMIN MEDICARE: Pneumococcal Vaccine ()     Pap imaged thin layer diagnostic with HPV (select HPV order below)     Pneumococcal vaccine 23 valent PPSV23  (Pneumovax) [48273]        Primary Care Provider Office Phone # Fax #    Thi Harvey [phone]       [address] [facility] [address]  [place] [address]        Equal Access to Services     NE DELATORRE : Hadii mariela Mandel, cherri ocononr, simon valderrama. [facility] [phone].    ATENCIÓN: Si habla español, tiene a francois disposición servicios gratuitos de asistencia lingüística. Llame " al [phone].    We comply with applicable federal civil rights laws and [place] laws. We do not discriminate on the basis of race, color, national origin, age, disability, sex, sexual orientation, or gender identity.            Thank you!     Thank you for choosing [facility]  for your care. Our goal is always to provide you with excellent care. Hearing back from our patients is one way we can continue to improve our services. Please take a few minutes to complete the written survey that you may receive in the mail after your visit with us. Thank you!             Your Updated Medication List - Protect others around you: Learn how to safely use, store and throw away your medicines at www.disposemymeds.org.          This list is accurate as of 11/28/18  1:41 PM.  Always use your most recent med list.                   Brand Name Dispense Instructions for use Diagnosis    [employer]S VITAMIN C 500 MG Tabs   Generic drug:  ascorbic acid     30 tablet    Take one daily        vitamin D3 1000 units (25 mcg) tablet    CHOLECALCIFEROL    30 tablet    Take 1 tablet (1,000 Units) by mouth

## 2018-11-30 LAB
COPATH REPORT: NORMAL
PAP: NORMAL

## 2018-12-03 LAB
FINAL DIAGNOSIS: NORMAL
HPV HR 12 DNA CVX QL NAA+PROBE: NEGATIVE
HPV16 DNA SPEC QL NAA+PROBE: NEGATIVE
HPV18 DNA SPEC QL NAA+PROBE: NEGATIVE
SPECIMEN DESCRIPTION: NORMAL
SPECIMEN SOURCE CVX/VAG CYTO: NORMAL

## 2018-12-05 DIAGNOSIS — Z00.00 ANNUAL PHYSICAL EXAM: ICD-10-CM

## 2018-12-05 LAB
CHOLEST SERPL-MCNC: 231 MG/DL
HDLC SERPL-MCNC: 64 MG/DL
LDLC SERPL CALC-MCNC: 146 MG/DL
NONHDLC SERPL-MCNC: 167 MG/DL
TRIGL SERPL-MCNC: 106 MG/DL

## 2019-09-29 ENCOUNTER — HEALTH MAINTENANCE LETTER (OUTPATIENT)
Age: 69
End: 2019-09-29

## 2020-03-15 ENCOUNTER — HEALTH MAINTENANCE LETTER (OUTPATIENT)
Age: 70
End: 2020-03-15

## 2021-01-14 ENCOUNTER — HEALTH MAINTENANCE LETTER (OUTPATIENT)
Age: 71
End: 2021-01-14

## 2021-03-15 ENCOUNTER — OFFICE VISIT (OUTPATIENT)
Dept: FAMILY MEDICINE | Facility: CLINIC | Age: 71
End: 2021-03-15
Payer: COMMERCIAL

## 2021-03-15 VITALS
DIASTOLIC BLOOD PRESSURE: 80 MMHG | SYSTOLIC BLOOD PRESSURE: 126 MMHG | OXYGEN SATURATION: 99 % | BODY MASS INDEX: 27.08 KG/M2 | HEIGHT: 66 IN | HEART RATE: 86 BPM | RESPIRATION RATE: 16 BRPM | TEMPERATURE: 96.9 F | WEIGHT: 168.5 LBS

## 2021-03-15 DIAGNOSIS — E55.9 VITAMIN D DEFICIENCY: ICD-10-CM

## 2021-03-15 DIAGNOSIS — Z01.818 PREOP GENERAL PHYSICAL EXAM: Primary | ICD-10-CM

## 2021-03-15 DIAGNOSIS — Z13.220 LIPID SCREENING: ICD-10-CM

## 2021-03-15 DIAGNOSIS — Z12.31 VISIT FOR SCREENING MAMMOGRAM: Primary | ICD-10-CM

## 2021-03-15 DIAGNOSIS — Z12.11 SCREEN FOR COLON CANCER: ICD-10-CM

## 2021-03-15 RX ORDER — AMOXICILLIN 500 MG
CAPSULE ORAL
Qty: 180 CAPSULE | Refills: 3 | COMMUNITY
Start: 2021-03-15

## 2021-03-15 ASSESSMENT — MIFFLIN-ST. JEOR: SCORE: 1303.31

## 2021-03-15 NOTE — PROGRESS NOTES
[place] [id number] [facility] [address], [address]  [facility] [address]  Phone: [phone]  Fax: [phone]  Primary Provider: Thi Harvey  Pre-op Performing Provider: THI HARVEY      PREOPERATIVE EVALUATION:  Today's date: 3/15/2021    Rosa Larson is a 70 year old female who presents for a preoperative evaluation for elective bilateral cataract extractions.     Surgical Information:  Surgery/Procedure:  Cataract Surgery, Bilateral  Surgery Location: [facility]   Surgeon: Dr. Corine Stone   Surgery Date: 3/29/2021  Time of Surgery: 6:30 am   Where patient plans to recover: At home with family  Fax number for surgical facility: [phone]    Type of Anesthesia Anticipated: MAC with local    Subjective     HPI related to upcoming procedure:   Rosa is a 71 yo woman in good health. No history of hypertension, diabetes or coronary artery disease. She is to undergo bilateral cataract extractions.     Preop Questions 3/15/2021   1. Have you ever had a heart attack or stroke? No   2. Have you ever had surgery on your heart or blood vessels, such as a stent placement, a coronary artery bypass, or surgery on an artery in your head, neck, heart, or legs? No   3. Do you have chest pain with activity? No   4. Do you have a history of  heart failure? No   5. Do you currently have a cold, bronchitis or symptoms of other infection? No   6. Do you have a cough, shortness of breath, or wheezing? No   7. Do you or anyone in your family have previous history of blood clots? YES - mother with hx of clots   8. Do you or does anyone in your family have a serious bleeding problem such as prolonged bleeding following surgeries or cuts? No   9. Have you ever had problems with anemia or been told to take iron pills? No   10. Have you had any abnormal blood loss such as black, tarry or bloody stools, or abnormal vaginal bleeding? No   11. Have you ever had a blood  transfusion? No   12. Are you willing to have a blood transfusion if it is medically needed before, during, or after your surgery? Yes   13. Have you or any of your relatives ever had problems with anesthesia? No   14. Do you have sleep apnea, excessive snoring or daytime drowsiness? No   15. Do you have any artifical heart valves or other implanted medical devices like a pacemaker, defibrillator, or continuous glucose monitor? No   16. Do you have artificial joints? YES - knee   17. Are you allergic to latex? No       Health Care Directive:  Patient does not have a Health Care Directive or Living Will: Patient states has Advance Directive and will bring in a copy to clinic.    Preoperative Review of :   reviewed - no record of controlled substances prescribed.      Review of Systems  CONSTITUTIONAL: NEGATIVE for fever, chills, change in weight  EYES: bilateral cataracts, has difficulty with night vision  ENT/MOUTH: NEGATIVE for ear, mouth and throat problems  RESP: NEGATIVE for significant cough or SOB  CV: NEGATIVE for chest pain, palpitations or peripheral edema  GI: no abdominal pain  : no urinary complaints    Patient Active Problem List    Diagnosis Date Noted     Varicose veins 02/27/2013     Priority: Medium     Snoring 02/27/2013     Priority: Medium      No past medical history on file.  Past Surgical History:   Procedure Laterality Date     KNEE SURGERY Left 2018    total knee     STRIP VEIN  1992     Current Outpatient Medications   Medication Sig Dispense Refill     Omega-3 Fatty Acids (FISH OIL) 1200 MG capsule Take 2 daily 180 capsule 3     ascorbic acid (CVS VITAMIN C) 500 MG TABS Take one daily 30 tablet 11     vitamin D3 (CHOLECALCIFEROL) 1000 units (25 mcg) tablet Take 1 tablet (1,000 Units) by mouth 30 tablet 11       No Known Allergies     Social History     Tobacco Use     Smoking status: Never Smoker     Smokeless tobacco: Never Used   Substance Use Topics     Alcohol use: Yes      "Comment: 3 per week     Family History   Problem Relation Age of Onset     Heart Disease Mother 95     Deep Vein Thrombosis Mother         related to varicose vein     Hypertension Father      Vascular Disease Sister         Brain surgery, something to do with vein and blood vessel. headaches     History   Drug Use No         Objective     /80 (BP Location: Right arm, Patient Position: Sitting, Cuff Size: Adult Large)   Pulse 86   Temp 96.9  F (36.1  C) (Temporal)   Resp 16   Ht 1.68 m (5' 6.14\")   Wt 76.4 kg (168 lb 8 oz)   SpO2 99%   BMI 27.08 kg/m      Physical Exam  GENERAL APPEARANCE: healthy, alert and no distress  EYES: PERRL, EOMI, bilateral cataracts noted  Neck: no LAD or TM  HENT: ear canals and TM's normal and nose and mouth without ulcers or lesions  RESP: lungs clear to auscultation - no rales, rhonchi or wheezes  CV: regular rate and rhythm, normal S1 S2, no S3 or S4 and no murmur, click or rub   ABDOMEN: soft, nontender, no HSM or masses and bowel sounds normal  NEURO: Normal strength and tone, sensory exam grossly normal, mentation intact and speech normal  Ext: no edema    No results for input(s): HGB, PLT, INR, NA, POTASSIUM, CR, A1C in the last 59199 hours.     Diagnostics:  No labs were ordered during this visit.   No EKG required for low risk surgery (cataract, skin procedure, breast biopsy, etc).    Revised Cardiac Risk Index (RCRI):  The patient has the following serious cardiovascular risks for perioperative complications:   - No serious cardiac risks = 0 points     RCRI Interpretation: 0 points: Class I (very low risk - 0.4% complication rate)    ASSESSMENT:  (Z01.818) Preop general physical exam  (primary encounter diagnosis)  Comment: elective bilateral cataract extraction  Plan: no contraindications, proceed as planned.     28 minutes spend on the date of this encounter doing chart review, history and exam, documentation and further activities as noted above.          Signed " Electronically by: Thi Harvey MD  Copy of this evaluation report is provided to requesting physician.

## 2021-03-15 NOTE — PATIENT INSTRUCTIONS
Shingrix vaccine  Shingles vaccine, series of 2  Call insurance to see if covered, usually go to the pharmacy    Future labs  Vitamin D   Lipid panel  CBC -blood counts  FIT card for colon cancer screening  Mammogram       Preparing for Your Surgery  Getting started  A nurse will call you to review your health history and instructions. They will give you an arrival time based on your scheduled surgery time.  Please be ready to share the following:    Your doctor's clinic name and phone number    Your medical, surgical and anesthesia history    A list of allergies and sensitivities    A list of medicines, including herbal treatments and over-the-counter drugs    Whether the patient has a legal guardian (ask how to send us the papers in advance)  If you have a child who's having surgery, please ask for a copy of Preparing for Your Child's Surgery.    Preparing for surgery    Within 30 days of surgery: Have a pre-op exam (sometimes called an H&P, or History and Physical). This can be done at a clinic or pre-operative center.  ? If you're having a , you may not need this exam. Talk to your care team    At your pre-op exam, talk to your care team about all medicines you take. If you need to stop any medicines before surgery, ask when to start taking them again.  ? We do this for your safety. Many medicines can make you bleed too much during surgery. Some change how well surgery (anesthesia) drugs work.    Call your insurance company to let them know you're having surgery. (If you don't have insurance, call [phone].)    Call your clinic if there's any change in your health. This includes signs of a cold or flu (sore throat, runny nose, cough, rash, fever). It also includes a scrape or scratch near the surgery site.    If you have questions on the day of surgery, call your hospital or surgery center.  Eating and drinking guidelines  For your safety: Unless your surgeon tells you otherwise, follow the  guidelines below.    Eat and drink as usual until 8 hours before surgery. After that, no food or milk.    Drink clear liquids until 2 hours before surgery. These are liquids you can see through, like water, Gatorade and Propel Water. You may also have black coffee and tea (no cream or milk).    Nothing by mouth within 2 hours of surgery. This includes gum, candy and breath mints.    If you drink, stop drinking alcohol the night before surgery.    If your care team tells you to take medicine on the morning of surgery, it's okay to take it with a sip of water.  Preventing infection    Shower or bathe the night before and morning of your surgery. Follow the instructions your clinic gave you. (If no instructions, use regular soap.)    Don't shave or clip hair near your surgery site. We'll remove the hair if needed.    Don't smoke or vape the morning of surgery. You may chew nicotine gum up to 2 hours before surgery. A nicotine patch is okay.  ? Note: Some surgeries require you to completely quit smoking and nicotine. Check with your surgeon.    Your care team will make every effort to keep you safe from infection. We will:  ? Clean our hands often with soap and water (or an alcohol-based hand rub).  ? Clean the skin at your surgery site with a special soap that kills germs.  ? Give you a special gown to keep you warm. (Cold raises the risk of infection.)  ? Wear special hair covers, masks, gowns and gloves during surgery.  ? Give antibiotic medicine, if prescribed. Not all surgeries need antibiotics.  What to bring on the day of surgery    Photo ID and insurance card    Copy of your health care directive, if you have one    Glasses and hearing aides (bring cases)  ? You can't wear contacts during surgery    Inhaler and eye drops, if you use them (tell us about these when you arrive)    CPAP machine or breathing device, if you use them    A few personal items, if spending the night    If you have . . .  ? A pacemaker or  ICD (cardiac defibrillator): Bring the ID card.  ? An implanted stimulator: Bring the remote control.  ? A legal guardian: Bring a copy of the certified (court-stamped) guardianship papers.  Please remove any jewelry, including body piercings. Leave jewelry and other valuables at home.  If you're going home the day of surgery  Important: If you don't follow the rules below, we must cancel your surgery.     Arrange for someone to drive you home after surgery. You may not drive, take a taxi or take public transportation by yourself (unless you'll have local anesthesia only).    Arrange for a responsible adult to stay with you overnight. If you don't, we may keep you in the hospital overnight, and you may need to pay the costs yourself.  Questions?   If you have any questions for your care team, list them here: _________________________________________________________________________________________________________________________________________________________________________________________________________________________________________________________________________________________________________________________  For informational purposes only. Not to replace the advice of your health care provider. Copyright   2003, 2019 [employer] [facility]. All rights reserved. Clinically reviewed by Cyndi Hidalgo MD. UIBLUEPRINT [id number] - REV 4/20.

## 2021-03-19 DIAGNOSIS — Z12.11 SCREEN FOR COLON CANCER: ICD-10-CM

## 2021-03-19 DIAGNOSIS — Z13.220 LIPID SCREENING: ICD-10-CM

## 2021-03-19 DIAGNOSIS — E55.9 VITAMIN D DEFICIENCY: ICD-10-CM

## 2021-03-19 LAB
CHOLEST SERPL-MCNC: 175 MG/DL (ref 0–200)
CHOLEST/HDLC SERPL: 2.8 {RATIO} (ref 0–5)
DEPRECATED CALCIDIOL+CALCIFEROL SERPL-MC: 18 UG/L (ref 20–75)
ERYTHROCYTE [DISTWIDTH] IN BLOOD BY AUTOMATED COUNT: 12.5 %
FASTING SPECIMEN: YES
HCT VFR BLD AUTO: 39.7 % (ref 35–47)
HDLC SERPL-MCNC: 63 MG/DL
HEMOGLOBIN: 13.3 G/DL (ref 11.7–15.7)
LDLC SERPL CALC-MCNC: 99 MG/DL (ref 0–129)
MCH RBC QN AUTO: 31.2 PG (ref 26.5–35)
MCHC RBC AUTO-ENTMCNC: 33.5 G/DL (ref 32–36)
MCV RBC AUTO: 93.2 FL (ref 78–100)
PLATELET # BLD AUTO: 295 K/UL (ref 150–450)
RBC # BLD AUTO: 4.26 M/UL (ref 3.8–5.2)
TRIGL SERPL-MCNC: 63 MG/DL (ref 0–150)
VLDL-CHOLESTEROL: 13 (ref 7–32)
WBC # BLD AUTO: 4.7 K/UL (ref 4–11)

## 2021-05-09 ENCOUNTER — HEALTH MAINTENANCE LETTER (OUTPATIENT)
Age: 71
End: 2021-05-09

## 2021-05-26 ENCOUNTER — RECORDS - HEALTHEAST (OUTPATIENT)
Dept: ADMINISTRATIVE | Facility: CLINIC | Age: 71
End: 2021-05-26

## 2021-07-19 ENCOUNTER — APPOINTMENT (OUTPATIENT)
Dept: LAB | Facility: CLINIC | Age: 71
End: 2021-07-19
Payer: MEDICARE

## 2021-07-20 ENCOUNTER — OFFICE VISIT (OUTPATIENT)
Dept: FAMILY MEDICINE | Facility: CLINIC | Age: 71
End: 2021-07-20
Payer: COMMERCIAL

## 2021-07-20 VITALS
BODY MASS INDEX: 25.51 KG/M2 | SYSTOLIC BLOOD PRESSURE: 120 MMHG | OXYGEN SATURATION: 99 % | DIASTOLIC BLOOD PRESSURE: 71 MMHG | WEIGHT: 158.75 LBS | HEIGHT: 66 IN | TEMPERATURE: 97.1 F | HEART RATE: 72 BPM

## 2021-07-20 DIAGNOSIS — M77.41 METATARSALGIA OF RIGHT FOOT: Primary | ICD-10-CM

## 2021-07-20 RX ORDER — CALCIUM CARBONATE 500(1250)
1 TABLET ORAL 2 TIMES DAILY
COMMUNITY

## 2021-07-20 ASSESSMENT — MIFFLIN-ST. JEOR: SCORE: 1251.84

## 2021-07-20 NOTE — NURSING NOTE
"71 year old  Chief Complaint   Patient presents with     Musculoskeletal Problem     right foot pain sole  of the foot and toes,  x 3 months        Blood pressure 120/71, pulse 72, temperature 97.1  F (36.2  C), temperature source Temporal, height 1.676 m (5' 6\"), weight 72 kg (158 lb 12 oz), SpO2 99 %, not currently breastfeeding. Body mass index is 25.62 kg/m .  Patient Active Problem List   Diagnosis     Varicose veins     Snoring       Wt Readings from Last 2 Encounters:   07/20/21 72 kg (158 lb 12 oz)   03/15/21 76.4 kg (168 lb 8 oz)     BP Readings from Last 3 Encounters:   07/20/21 120/71   03/15/21 126/80   11/28/18 143/85         Current Outpatient Medications   Medication     ascorbic acid (CVS VITAMIN C) 500 MG TABS     Omega-3 Fatty Acids (FISH OIL) 1200 MG capsule     vitamin D3 (CHOLECALCIFEROL) 1000 units (25 mcg) tablet     No current facility-administered medications for this visit.       Social History     Tobacco Use     Smoking status: Never Smoker     Smokeless tobacco: Never Used   Substance Use Topics     Alcohol use: Yes     Comment: 3 per week     Drug use: No       Health Maintenance Due   Topic Date Due     ADVANCE CARE PLANNING  Never done     ZOSTER IMMUNIZATION (2 of 3) 03/09/2017     MAMMO SCREENING  03/14/2019     MEDICARE ANNUAL WELLNESS VISIT  11/28/2019     COLORECTAL CANCER SCREENING  12/08/2019     PHQ-2  01/01/2021       Lab Results   Component Value Date    PAP NIL 11/28/2018 July 20, 2021 9:15 AM  "

## 2021-07-20 NOTE — PROGRESS NOTES
"Rosa Larson is a 71 year old female here for the following issues:    Right foot pain   Rosa presents with intermittent right foot pain for the past 3 months, but now foot pain is fairly constant. She walks daily and lessens the more she walks.Pain is more noticeable with ambulation after sitting for long periods of time, or upon getting up for the day.  No numbness. The pain is located over the right ball of her foot and radiates to her toes, and the most intense pain is between her second and third toes. Sometimes pain radiates to her great toe. She typically wears tennis shoes and walking shoes. She occasionally uses Advil.     Patient Active Problem List   Diagnosis     Varicose veins     Snoring       Current Outpatient Medications   Medication Sig Dispense Refill     ascorbic acid (CVS VITAMIN C) 500 MG TABS Take one daily 30 tablet 11     Omega-3 Fatty Acids (FISH OIL) 1200 MG capsule Take 2 daily 180 capsule 3     vitamin D3 (CHOLECALCIFEROL) 1000 units (25 mcg) tablet Take 1 tablet (1,000 Units) by mouth 30 tablet 11       No Known Allergies     EXAM  /71 (BP Location: Right arm, Patient Position: Sitting, Cuff Size: Adult Regular)   Pulse 72   Temp 97.1  F (36.2  C) (Temporal)   Ht 1.676 m (5' 6\")   Wt 72 kg (158 lb 12 oz)   SpO2 99%   BMI 25.62 kg/m    Gen: Alert, pleasant, NAD  HEENT: Normocephalic, atraumatic.   Ext: no peripheral edema, pulses full on right foot. No tenderness of [place]'s tendon or heel. No pain over arch of foot. Point tenderness on the plantar surface metatarsal heads at the base of the second and third toes.       Assessment:  (M77.41) Metatarsalgia of right foot  (primary encounter diagnosis)  Comment: Suspect Pinto's neuroma. Other ddx includes a bone spur, stress fracture.   Plan: Orthopedic  Referral        Referral to ortho, podiatry for further evaluation and treatment. In the meantime, I recommend using a metatarsal pad. This was given to patient " today.     Thi Harvey MD  Internal Medicine/Pediatrics

## 2021-10-01 ENCOUNTER — OFFICE VISIT (OUTPATIENT)
Dept: FAMILY MEDICINE | Facility: CLINIC | Age: 71
End: 2021-10-01
Payer: COMMERCIAL

## 2021-10-01 VITALS
DIASTOLIC BLOOD PRESSURE: 78 MMHG | OXYGEN SATURATION: 99 % | WEIGHT: 159.12 LBS | HEART RATE: 80 BPM | SYSTOLIC BLOOD PRESSURE: 138 MMHG | BODY MASS INDEX: 25.57 KG/M2 | HEIGHT: 66 IN | TEMPERATURE: 97.1 F

## 2021-10-01 DIAGNOSIS — Z01.818 PREOP GENERAL PHYSICAL EXAM: Primary | ICD-10-CM

## 2021-10-01 ASSESSMENT — MIFFLIN-ST. JEOR: SCORE: 1253.26

## 2021-10-01 NOTE — PATIENT INSTRUCTIONS
Hold any vitamins, herbs, supplements starting now. Stop Advil on Kishan Oct 3.   Use Tylenol 500mg , may take 2 tablets 3  Times a day, max 3000 mg in 24 hr..     Miralax powder or colace daily if you are taking narcotic.    Preparing for Your Surgery  Getting started  A nurse will call you to review your health history and instructions. They will give you an arrival time based on your scheduled surgery time.  Please be ready to share the following:    Your doctor's clinic name and phone number    Your medical, surgical and anesthesia history    A list of allergies and sensitivities    A list of medicines, including herbal treatments and over-the-counter drugs    Whether the patient has a legal guardian (ask how to send us the papers in advance)  If you have a child who's having surgery, please ask for a copy of Preparing for Your Child's Surgery.    Preparing for surgery    Within 30 days of surgery: Have a pre-op exam (sometimes called an H&P, or History and Physical). This can be done at a clinic or pre-operative center.  ? If you're having a , you may not need this exam. Talk to your care team    At your pre-op exam, talk to your care team about all medicines you take. If you need to stop any medicines before surgery, ask when to start taking them again.  ? We do this for your safety. Many medicines can make you bleed too much during surgery. Some change how well surgery (anesthesia) drugs work.    Call your insurance company to let them know you're having surgery. (If you don't have insurance, call [phone].)    Call your clinic if there's any change in your health. This includes signs of a cold or flu (sore throat, runny nose, cough, rash, fever). It also includes a scrape or scratch near the surgery site.    If you have questions on the day of surgery, call your hospital or surgery center.  Eating and drinking guidelines  For your safety: Unless your surgeon tells you otherwise, follow the  guidelines below.    Eat and drink as usual until 8 hours before surgery. After that, no food or milk.    Drink clear liquids until 2 hours before surgery. These are liquids you can see through, like water, Gatorade and Propel Water. You may also have black coffee and tea (no cream or milk).    Nothing by mouth within 2 hours of surgery. This includes gum, candy and breath mints.    If you drink, stop drinking alcohol the night before surgery.    If your care team tells you to take medicine on the morning of surgery, it's okay to take it with a sip of water.  Preventing infection    Shower or bathe the night before and morning of your surgery. Follow the instructions your clinic gave you. (If no instructions, use regular soap.)    Don't shave or clip hair near your surgery site. We'll remove the hair if needed.    Don't smoke or vape the morning of surgery. You may chew nicotine gum up to 2 hours before surgery. A nicotine patch is okay.  ? Note: Some surgeries require you to completely quit smoking and nicotine. Check with your surgeon.    Your care team will make every effort to keep you safe from infection. We will:  ? Clean our hands often with soap and water (or an alcohol-based hand rub).  ? Clean the skin at your surgery site with a special soap that kills germs.  ? Give you a special gown to keep you warm. (Cold raises the risk of infection.)  ? Wear special hair covers, masks, gowns and gloves during surgery.  ? Give antibiotic medicine, if prescribed. Not all surgeries need antibiotics.  What to bring on the day of surgery    Photo ID and insurance card    Copy of your health care directive, if you have one    Glasses and hearing aides (bring cases)  ? You can't wear contacts during surgery    Inhaler and eye drops, if you use them (tell us about these when you arrive)    CPAP machine or breathing device, if you use them    A few personal items, if spending the night    If you have . . .  ? A pacemaker or  ICD (cardiac defibrillator): Bring the ID card.  ? An implanted stimulator: Bring the remote control.  ? A legal guardian: Bring a copy of the certified (court-stamped) guardianship papers.  Please remove any jewelry, including body piercings. Leave jewelry and other valuables at home.  If you're going home the day of surgery  Important: If you don't follow the rules below, we must cancel your surgery.     Arrange for someone to drive you home after surgery. You may not drive, take a taxi or take public transportation by yourself (unless you'll have local anesthesia only).    Arrange for a responsible adult to stay with you overnight. If you don't, we may keep you in the hospital overnight, and you may need to pay the costs yourself.  Questions?   If you have any questions for your care team, list them here: _________________________________________________________________________________________________________________________________________________________________________________________________________________________________________________________________________________________________________________________  For informational purposes only. Not to replace the advice of your health care provider. Copyright   2003, 2019 [facility]. All rights reserved. Clinically reviewed by Cyndi Hidalgo MD. [employer] [facility] [id number] - REV 4/20.

## 2021-10-01 NOTE — PROGRESS NOTES
[address], [address]  [facility] [address]  Phone: [phone]  Fax: [phone]  Primary Provider: Thi Harvey        PREOPERATIVE EVALUATION:  Today's date: 10/1/2021    Rosa Larson is a 71 year old female who presents for a preoperative evaluation.    Surgical Information:  Surgery/Procedure:  RIGHT 2nd PTPJ arthrodesis,2nd MTPJ capsule repair, 2nd MT shortening osteotomy  Surgery Location: [facility]  Surgeon: Mateo Olsen MD  Surgery Date: 10/07/2021  Time of Surgery: 10:45 am  Where patient plans to recover: At home with family  Fax number for surgical facility: [phone]    Type of Anesthesia Anticipated: TBD    Assessment & Plan     The proposed surgical procedure is considered INTERMEDIATE risk.    (Z01.818) Preop general physical exam  (primary encounter diagnosis)  Comment: elective right foot surgery for repair of hammertoe of 2nd toe and repair of joint capsule tear  Plan: no contraindication to procedure, proceed as planned.  Pt is instructed to hold any vitamins, herbs, supplements 10 days prior to surgery. No NSAIDs 3 days prior to surgery.       Risks and Recommendations:  The patient has the following additional risks and recommendations for perioperative complications:   - No identified additional risk factors other than previously addressed      RECOMMENDATION:  APPROVAL GIVEN to proceed with proposed procedure, without further diagnostic evaluation.    Review of external notes as documented above    42 minutes spent on the date of the encounter doing chart review, history and exam, documentation and further activities per the note    Thi Harvey MD  Internal Medicine/Pediatrics          Subjective     HPI related to upcoming procedure:   Rosa is a 72 yo woman who presented to me in clinic 7/2021 for right foot pain which began 3 months prior. No report of injury. She had pain over the ball of her foot that radiated to the  "2nd and 3rd toes with walking. I referred her on to see an orthopedic provider. Arthrogram was performed. Per Dr. Olsen's notes \" arthrogram demonstrated significant extravasation of contrast along the plantar medial aspect 2nd MTPJ compatible with tear. This was a robust extravasation for a suspected fairly large tear\". Surgery is recommended as treatment and Rosa is agreeable with proceeding.       Preop Questions 10/1/2021   1. Have you ever had a heart attack or stroke? No   2. Have you ever had surgery on your heart or blood vessels, such as a stent placement, a coronary artery bypass, or surgery on an artery in your head, neck, heart, or legs? No   3. Do you have chest pain with activity? No   4. Do you have a history of  heart failure? No   5. Do you currently have a cold, bronchitis or symptoms of other infection? No   6. Do you have a cough, shortness of breath, or wheezing? No   7. Do you or anyone in your family have previous history of blood clots? No   8. Do you or does anyone in your family have a serious bleeding problem such as prolonged bleeding following surgeries or cuts? No   9. Have you ever had problems with anemia or been told to take iron pills? No   10. Have you had any abnormal blood loss such as black, tarry or bloody stools, or abnormal vaginal bleeding? No   11. Have you ever had a blood transfusion? No   12. Are you willing to have a blood transfusion if it is medically needed before, during, or after your surgery? Yes   13. Have you or any of your relatives ever had problems with anesthesia? No   14. Do you have sleep apnea, excessive snoring or daytime drowsiness? No   15. Do you have any artifical heart valves or other implanted medical devices like a pacemaker, defibrillator, or continuous glucose monitor? No   16. Do you have artificial joints? YES - left knee repacement   17. Are you allergic to latex? No       Health Care Directive:  Patient does not have a Health Care " Directive or Living Will: Discussed advance care planning with patient; information given to patient to review.    Preoperative Review of :   reviewed - no record of controlled substances prescribed.      Rosa is a healthy individual, without history of heart dx,lung dx, hypertension or DM. Nonsmoker. No hx of sleep apnea. No personal or family history of bleeding or clotting disorders. No personal or family history of intolerance to anesthesia. She has not had any previous complications with surgeries.     Osteoarthritis  Rosa notes thickening of the knuckles of her left hand (MCP joints) and the distal joint of her index finger. This sometimes becomes red but in general there is morning stiffness that dissipates during the day. She uses advil. No fevers, fatigue or skin rashes.     Review of Systems  Constitutional, neuro, ENT, endocrine, pulmonary, cardiac, gastrointestinal, genitourinary, musculoskeletal, integument and psychiatric systems are negative, except as otherwise noted.    Patient Active Problem List    Diagnosis Date Noted     Varicose veins 02/27/2013     Priority: Medium     Snoring 02/27/2013     Priority: Medium      No past medical history on file.  Past Surgical History:   Procedure Laterality Date     KNEE SURGERY Left 2018    total knee     STRIP VEIN  1992     Current Outpatient Medications   Medication Sig Dispense Refill     ascorbic acid ([employer] VITAMIN C) 500 MG TABS Take one daily 30 tablet 11     calcium carbonate (OS-LUNA) 500 MG tablet Take 1 tablet by mouth 2 times daily       Omega-3 Fatty Acids (FISH OIL) 1200 MG capsule Take 2 daily 180 capsule 3     vitamin D3 (CHOLECALCIFEROL) 1000 units (25 mcg) tablet Take 1 tablet (1,000 Units) by mouth 30 tablet 11       No Known Allergies     Social History     Tobacco Use     Smoking status: Never Smoker     Smokeless tobacco: Never Used   Substance Use Topics     Alcohol use: Yes     Comment: 3 per week     Family History   Problem  "Relation Age of Onset     Heart Disease Mother 95     Deep Vein Thrombosis Mother         related to varicose vein     Hypertension Father      Vascular Disease Sister         Brain surgery, something to do with vein and blood vessel. headaches     History   Drug Use No         Objective   /78   Pulse 80   Temp 97.1  F (36.2  C)   Ht 1.676 m (5' 5.98\")   Wt 72.2 kg (159 lb 1.9 oz)   SpO2 99%   BMI 25.70 kg/m      Physical Exam    GENERAL APPEARANCE: healthy, alert and no distress     EYES: EOMI, PERRL     HENT: ear canals and TM's normal and nose and mouth without ulcers or lesions     NECK: no adenopathy,  thyroid normal to palpation     RESP: lungs clear to auscultation      CV: regular rates and rhythm, normal S1 S2, no murmur, click or rub     ABDOMEN:  soft, nontender, no HSM or masses and bowel sounds normal     MS: thickening over the MCPS of the left hand, no redness or warmth. Otherwise, joint exam unremarkable.  FROM in all extremities.     SKIN: no suspicious lesions or rashes     NEURO: Normal strength and tone, sensory exam grossly normal, mentation intact and speech normal     PSYCH: mentation appears normal. and affect normal/bright      EXT: no edema      Right Foot: no redness or warmth, no palpable tenderness over metatarsal heads or toes today    Recent Labs   Lab Test 03/19/21  0828   HGB 13.3        Diagnostics:  No labs were ordered during this visit.   No EKG required, no history of coronary heart disease, significant arrhythmia, peripheral arterial disease or other structural heart disease.    Revised Cardiac Risk Index (RCRI):  The patient has the following serious cardiovascular risks for perioperative complications:   - No serious cardiac risks = 0 points     RCRI Interpretation: 0 points: Class I (very low risk - 0.4% complication rate)    Signed Electronically by: Thi Harvey MD  Copy of this evaluation report is provided to requesting physician.      "

## 2021-10-24 ENCOUNTER — HEALTH MAINTENANCE LETTER (OUTPATIENT)
Age: 71
End: 2021-10-24

## 2021-11-19 ENCOUNTER — VIRTUAL VISIT (OUTPATIENT)
Dept: FAMILY MEDICINE | Facility: CLINIC | Age: 71
End: 2021-11-19
Payer: MEDICARE

## 2021-11-19 DIAGNOSIS — M25.50 MULTIPLE JOINT PAIN: Primary | ICD-10-CM

## 2021-11-19 DIAGNOSIS — M19.90 INFLAMMATORY ARTHRITIS: ICD-10-CM

## 2021-11-19 PROBLEM — M17.12 PRIMARY OSTEOARTHRITIS OF LEFT KNEE: Status: ACTIVE | Noted: 2021-11-19

## 2021-11-19 NOTE — PATIENT INSTRUCTIONS
[facility]  will call you to coordinate your care as prescribed by your provider. A representative will call you within 1 business day to help schedule your appointment, or you may contact the  Representative at: [phone]

## 2021-11-19 NOTE — PROGRESS NOTES
Rosa is a 71 year old who is being evaluated via a billable video visit.      How would you like to obtain your AVS? MyChart  If the video visit is dropped, the invitation should be resent by: Send to e-mail at: [email or web address]  Will anyone else be joining your video visit? No      Video Start Time:  Start time: 7:59 AM  End time: 8:19 am  Total : 20 minutes    ASSESSMENT:  (M25.50) Multiple joint pain  (primary encounter diagnosis)  Comment: one month history of warmth, tenderness and swelling of hands, alternating joints, little relief with use of narcotic or NSAIDs, previous hx of osteoarthritis but new symptoms raise question of inflammatory arthritis  Plan: DNA double stranded antibodies, Vuong JOHANNE         Antibody IgG, Protein  random urine with Creat         Ratio, Complement C3, Complement C4, SSA Ro JOHANNE        Antibody IgG, SSB La JOHANNE Antibody IgG,         Rheumatoid factor, Cyclic Citrullinated Peptide        Antibody IgG, Orthopedic  Referral        Recommend lab work and referral to see hand surgeon for evaluation, xrays and recommendations. Could refer on to rheumatology but wait time is likely to be several months. After discussion, Rosa is comfortable with this approach.   ADDENDUM: 11/23/21  CCP and RA labs returned abnormally high. CRP and ESR high. These were drawn by orthopedic provider. Rosa is doing a bit better today with use of Diclofenac twice daily. High suspicion for rheumatoid arthritis/ inflammatory arthritis. Will refer to rheumatology. She is to keep diary of symptoms. Has had 4-5 episodes in the past month. Xray of hands with only mild changes in CMC joint.     Thi Harvey MD  Internal Medicine/Pediatrics      Thi Harvey MD  Internal Medicine/Pediatrics    Subjective     Joint pain  Rosa is a 71-year-old woman with history of osteoarthritis, status post left total knee arthroscopy in 2018.  More recently, she underwent right foot surgery for a joint capsule  tear. She is now 6+ weeks post op and is doing well. Will hopefully be transitioning out of the boot to a shoe in the near future.    Rosa presents today, concerned about new joint pain in both of her hands.  Over the past 3 weeks, she has had 3-5 flares of significant joint pain in both of her hands.  She describes redness, swelling and pain over several different joints.  During this time, it can be very difficult to .  The longest episode lasted 2 days.  No associated fever, chills, weight loss or skin rashes.  No history of psoriasis.  She has tried taking narcotics, leftover from her surgery, to facilitate sleep and these were of little help. NSAIDs and tylenol were also of little benefit.     She has also had wrist pain that radiates up her forearm. Elbows, shoulders not involved. Toes not involved. She has hx of dry eyes.     No family hx of RA, but her mother had arthritic hands.       Vitals:  No vitals were obtained today due to virtual visit.    Physical Exam   GENERAL: Healthy, alert and no distress  EYES: Eyes without discharge or erythema, or obvious scleral/conjunctival abnormalities.  RESP: No visible retractions or increased work of breathing.    SKIN: Visible skin clear.  NEURO: Cranial nerves grossly intact.  Mentation and speech appropriate for age.  PSYCH: affect normal/bright, judgement and insight intact, normal speech and appearance well-groomed.        Video-Visit Details    Type of service:  Video Visit  Originating Location (pt. Location): Home    Distant Location (provider location):  [facility]     Platform used for Video Visit: [employer]

## 2021-11-22 ENCOUNTER — OFFICE VISIT (OUTPATIENT)
Dept: ORTHOPEDICS | Facility: CLINIC | Age: 71
End: 2021-11-22
Attending: INTERNAL MEDICINE
Payer: MEDICARE

## 2021-11-22 ENCOUNTER — LAB (OUTPATIENT)
Dept: LAB | Facility: CLINIC | Age: 71
End: 2021-11-22
Attending: FAMILY MEDICINE

## 2021-11-22 ENCOUNTER — ANCILLARY PROCEDURE (OUTPATIENT)
Dept: GENERAL RADIOLOGY | Facility: CLINIC | Age: 71
End: 2021-11-22
Attending: FAMILY MEDICINE
Payer: MEDICARE

## 2021-11-22 DIAGNOSIS — M79.641 BILATERAL HAND PAIN: Primary | ICD-10-CM

## 2021-11-22 DIAGNOSIS — M25.50 MULTIPLE JOINT PAIN: ICD-10-CM

## 2021-11-22 DIAGNOSIS — M79.641 BILATERAL HAND PAIN: ICD-10-CM

## 2021-11-22 DIAGNOSIS — M79.642 BILATERAL HAND PAIN: Primary | ICD-10-CM

## 2021-11-22 DIAGNOSIS — M79.642 BILATERAL HAND PAIN: ICD-10-CM

## 2021-11-22 LAB
CREAT UR-MCNC: 176 MG/DL
CRP SERPL-MCNC: 17.4 MG/L (ref 0–8)
ERYTHROCYTE [SEDIMENTATION RATE] IN BLOOD BY WESTERGREN METHOD: 46 MM/HR (ref 0–30)
PROT UR-MCNC: 0.15 G/L
PROT/CREAT 24H UR: 0.09 G/G CR (ref 0–0.2)
URATE SERPL-MCNC: 2.8 MG/DL (ref 2.6–6)

## 2021-11-22 PROCEDURE — 86160 COMPLEMENT ANTIGEN: CPT | Performed by: INTERNAL MEDICINE

## 2021-11-22 PROCEDURE — 86225 DNA ANTIBODY NATIVE: CPT | Performed by: INTERNAL MEDICINE

## 2021-11-22 PROCEDURE — 85652 RBC SED RATE AUTOMATED: CPT | Performed by: PATHOLOGY

## 2021-11-22 PROCEDURE — 86235 NUCLEAR ANTIGEN ANTIBODY: CPT | Performed by: INTERNAL MEDICINE

## 2021-11-22 PROCEDURE — 86140 C-REACTIVE PROTEIN: CPT | Performed by: PATHOLOGY

## 2021-11-22 PROCEDURE — 99204 OFFICE O/P NEW MOD 45 MIN: CPT | Performed by: FAMILY MEDICINE

## 2021-11-22 PROCEDURE — 73130 X-RAY EXAM OF HAND: CPT | Mod: RT | Performed by: RADIOLOGY

## 2021-11-22 PROCEDURE — 86200 CCP ANTIBODY: CPT | Performed by: INTERNAL MEDICINE

## 2021-11-22 PROCEDURE — 86431 RHEUMATOID FACTOR QUANT: CPT | Performed by: INTERNAL MEDICINE

## 2021-11-22 PROCEDURE — 84156 ASSAY OF PROTEIN URINE: CPT | Performed by: PATHOLOGY

## 2021-11-22 PROCEDURE — 84550 ASSAY OF BLOOD/URIC ACID: CPT | Performed by: PATHOLOGY

## 2021-11-22 PROCEDURE — 36415 COLL VENOUS BLD VENIPUNCTURE: CPT | Performed by: PATHOLOGY

## 2021-11-22 RX ORDER — DICLOFENAC SODIUM 75 MG/1
75 TABLET, DELAYED RELEASE ORAL 2 TIMES DAILY
Qty: 28 TABLET | Refills: 1 | Status: SHIPPED | OUTPATIENT
Start: 2021-11-22 | End: 2021-11-29

## 2021-11-22 NOTE — PROGRESS NOTES
[facility]  SPORTS & ORTHOPEDIC CLINIC VISIT     Nov 22, 2021        ASSESSMENT & PLAN    71-year-old with recurrent pain swelling and inflammation of the bilateral hands.  Worse in the left hand at the current time.  Based on evaluation today suspect inflammatory etiology which is supported by her history as well.    Reviewed imaging and assessment with patient in detail  Take diclofenac twice daily with food for up to 2 weeks  Consider using Ace wrap for comfort.  Ice to the area once daily  Follow-up in lab after the appointment for blood draw of labs ordered by primary care provider as well as additional labs added by me  Follow-up with primary care provider regarding results.    Finn Meyer MD  [facility] [place]    -----  Chief Complaint   Patient presents with     Left Hand - Pain     Right Hand - Pain       SUBJECTIVE  Rosa Lasron is a/an 71 year old female who is seen in consultation at the request of  Thi Harvey M.D. for evaluation of  Bilateral hand pain and swelling.     The patient is seen by themselves.  The patient is Right handed    Onset: 2 month(s) ago. Reports insidious onset without acute precipitating event. Has swelling, redness and pain in hand. She does not remember one injury.  Was present both hands.  Now seems to be improving slightly.  Worse in the left hand.  Localized to the base of the thumb as well as radiating up the wrist has had some redness and swelling.  Location of Pain: left hand   Worsened by: Flare ups   Better with: Advil  Treatments tried:Hydrocodone, Voltaren, Heat  Associated symptoms: swelling      Has been seen by primary care provider for this problem recently who has ordered blood work that has not yet been completed.  Has concern for inflammatory arthritis.    Orthopedic/Surgical history: NO  Social History/Occupation: Retired       REVIEW OF SYSTEMS:    Do you have fever, chills, weight  loss? No    Do you have any vision problems? No    Do you have any chest pain or edema? No    Do you have any shortness of breath or wheezing?  No    Do you have stomach problems? No    Do you have any numbness or focal weakness? No    Do you have diabetes? No    Do you have problems with bleeding or clotting? No    Do you have an rashes or other skin lesions? No    OBJECTIVE:  There were no vitals taken for this visit.     General  - alert, pleasant, no distress  CV  - normal radial pulse, cap refill brisk  Musculoskeletal -left wrist  - inspection: Faint erythema soft tissue swelling over the radial side of the wrist with some extension of the forearm.  No warmth to palpation.  Additionally has diffuse swelling in the DIP joints  - palpation: Tender over the first CMC as well as radiocarpal joint particularly radially.  - ROM:  90 deg flexion   70 deg extension   25 deg abduction   65 deg adduction  - strength: 5/5  strength, 5/5 flexion, extension, pronation, supination, adduction, abduction  - special tests:  Pain with CMC grind.    Neuro  - no sensory or motor deficit, grossly normal coordination, normal muscle tone  Skin  - no ecchymosis, warmth, or induration, no obvious rash          RADIOLOGY:    3 view xrays of bilateral hands performed and reviewed independently demonstrating DJD in the first CMC bilaterally left worse than right as well as scattered changes in the DIP.  No acute findings.  No sign of erosive changes. See EMR for formal radiology report.

## 2021-11-22 NOTE — LETTER
11/22/2021      RE: Rosa Larson  [address]  [facility] [address]         [facility]  SPORTS & ORTHOPEDIC CLINIC VISIT     Nov 22, 2021        ASSESSMENT & PLAN    71-year-old with recurrent pain swelling and inflammation of the bilateral hands.  Worse in the left hand at the current time.  Based on evaluation today suspect inflammatory etiology which is supported by her history as well.    Reviewed imaging and assessment with patient in detail  Take diclofenac twice daily with food for up to 2 weeks  Consider using Ace wrap for comfort.  Ice to the area once daily  Follow-up in lab after the appointment for blood draw of labs ordered by primary care provider as well as additional labs added by me  Follow-up with primary care provider regarding results.    Finn Meyer MD  [facility] SPORTS MEDICINE CLINIC [place]    -----  Chief Complaint   Patient presents with     Left Hand - Pain     Right Hand - Pain       SUBJECTIVE  Rosa Larson is a/an 71 year old female who is seen in consultation at the request of  Thi Harvey M.D. for evaluation of  Bilateral hand pain and swelling.     The patient is seen by themselves.  The patient is Right handed    Onset: 2 month(s) ago. Reports insidious onset without acute precipitating event. Has swelling, redness and pain in hand. She does not remember one injury.  Was present both hands.  Now seems to be improving slightly.  Worse in the left hand.  Localized to the base of the thumb as well as radiating up the wrist has had some redness and swelling.  Location of Pain: left hand   Worsened by: Flare ups   Better with: Advil  Treatments tried:Hydrocodone, Voltaren, Heat  Associated symptoms: swelling      Has been seen by primary care provider for this problem recently who has ordered blood work that has not yet been completed.  Has concern for inflammatory arthritis.    Orthopedic/Surgical history: NO  Social  History/Occupation: Retired       REVIEW OF SYSTEMS:    Do you have fever, chills, weight loss? No    Do you have any vision problems? No    Do you have any chest pain or edema? No    Do you have any shortness of breath or wheezing?  No    Do you have stomach problems? No    Do you have any numbness or focal weakness? No    Do you have diabetes? No    Do you have problems with bleeding or clotting? No    Do you have an rashes or other skin lesions? No    OBJECTIVE:  There were no vitals taken for this visit.     General  - alert, pleasant, no distress  CV  - normal radial pulse, cap refill brisk  Musculoskeletal -left wrist  - inspection: Faint erythema soft tissue swelling over the radial side of the wrist with some extension of the forearm.  No warmth to palpation.  Additionally has diffuse swelling in the DIP joints  - palpation: Tender over the first CMC as well as radiocarpal joint particularly radially.  - ROM:  90 deg flexion   70 deg extension   25 deg abduction   65 deg adduction  - strength: 5/5  strength, 5/5 flexion, extension, pronation, supination, adduction, abduction  - special tests:  Pain with CMC grind.    Neuro  - no sensory or motor deficit, grossly normal coordination, normal muscle tone  Skin  - no ecchymosis, warmth, or induration, no obvious rash          RADIOLOGY:    3 view xrays of bilateral hands performed and reviewed independently demonstrating DJD in the first CMC bilaterally left worse than right as well as scattered changes in the DIP.  No acute findings.  No sign of erosive changes. See EMR for formal radiology report.         Finn Meyer MD

## 2021-11-22 NOTE — PATIENT INSTRUCTIONS
Take diclofenac twice daily with food for up to 2 weeks  Consider using Ace wrap for comfort.  Ice to the area once daily  Follow-up in lab after the appointment for blood draw

## 2021-11-23 LAB
C3 SERPL-MCNC: 141 MG/DL (ref 81–157)
C4 SERPL-MCNC: 35 MG/DL (ref 13–39)
CCP AB SER IA-ACNC: >340 U/ML
DSDNA AB SER-ACNC: 0.8 IU/ML
ENA SM IGG SER IA-ACNC: 2.2 U/ML
ENA SM IGG SER IA-ACNC: NEGATIVE
ENA SS-A AB SER IA-ACNC: <0.5 U/ML
ENA SS-A AB SER IA-ACNC: NEGATIVE
ENA SS-B IGG SER IA-ACNC: <0.6 U/ML
ENA SS-B IGG SER IA-ACNC: NEGATIVE
RHEUMATOID FACT SER NEPH-ACNC: 340 IU/ML

## 2021-11-24 ENCOUNTER — TELEPHONE (OUTPATIENT)
Dept: MULTI SPECIALTY CLINIC | Facility: CLINIC | Age: 71
End: 2021-11-24

## 2021-11-24 NOTE — TELEPHONE ENCOUNTER
[facility] Call Center    Phone Message    May a detailed message be left on voicemail: yes     Reason for Call: Appointment Intake    Referring Provider Name: Thi Harvey MD  Diagnosis and/or Symptoms: - RA - New diagnosis of rheumatoid arthritis. labs are done, CCP, Rheum Factor, CRP and ESR very high, unilateral hand/wrist pain     ---Referring provider sent as Urgent referral 3-5 days     Please advise when to schedule    Action Taken: Message routed to:  [facility] Rhe[facility]    Travel Screening: Not Applicable

## 2021-11-26 NOTE — TELEPHONE ENCOUNTER
Ok to schedule with Dr. An on 11/29 at 8 am.  This would be an in person appt.     Left message requesting pt return call to get her scheduled with a rheumatologist.     Roseanne Ferrara RN  Rheumatology Clinic

## 2021-11-29 ENCOUNTER — OFFICE VISIT (OUTPATIENT)
Dept: RHEUMATOLOGY | Facility: CLINIC | Age: 71
End: 2021-11-29
Attending: INTERNAL MEDICINE
Payer: MEDICARE

## 2021-11-29 ENCOUNTER — TELEPHONE (OUTPATIENT)
Dept: RHEUMATOLOGY | Facility: CLINIC | Age: 71
End: 2021-11-29
Payer: MEDICARE

## 2021-11-29 VITALS
OXYGEN SATURATION: 96 % | WEIGHT: 165.8 LBS | SYSTOLIC BLOOD PRESSURE: 135 MMHG | BODY MASS INDEX: 26.77 KG/M2 | HEART RATE: 81 BPM | DIASTOLIC BLOOD PRESSURE: 83 MMHG

## 2021-11-29 DIAGNOSIS — M05.741 RHEUMATOID ARTHRITIS INVOLVING BOTH HANDS WITH POSITIVE RHEUMATOID FACTOR (H): Primary | ICD-10-CM

## 2021-11-29 DIAGNOSIS — M19.90 INFLAMMATORY ARTHRITIS: ICD-10-CM

## 2021-11-29 DIAGNOSIS — M05.742 RHEUMATOID ARTHRITIS INVOLVING BOTH HANDS WITH POSITIVE RHEUMATOID FACTOR (H): Primary | ICD-10-CM

## 2021-11-29 DIAGNOSIS — Z79.899 HIGH RISK MEDICATION USE: ICD-10-CM

## 2021-11-29 DIAGNOSIS — Z11.59 ENCOUNTER FOR SCREENING FOR OTHER VIRAL DISEASES: ICD-10-CM

## 2021-11-29 PROCEDURE — 99417 PROLNG OP E/M EACH 15 MIN: CPT | Performed by: STUDENT IN AN ORGANIZED HEALTH CARE EDUCATION/TRAINING PROGRAM

## 2021-11-29 PROCEDURE — 99205 OFFICE O/P NEW HI 60 MIN: CPT | Performed by: STUDENT IN AN ORGANIZED HEALTH CARE EDUCATION/TRAINING PROGRAM

## 2021-11-29 RX ORDER — MELOXICAM 15 MG/1
15 TABLET ORAL DAILY
Qty: 60 TABLET | Refills: 0 | Status: SHIPPED | OUTPATIENT
Start: 2021-11-29 | End: 2022-01-25

## 2021-11-29 RX ORDER — FOLIC ACID 1 MG/1
1 TABLET ORAL DAILY
Qty: 90 TABLET | Refills: 3 | Status: SHIPPED | OUTPATIENT
Start: 2021-11-29 | End: 2022-11-29

## 2021-11-29 NOTE — TELEPHONE ENCOUNTER
Called pt with this information, and she verbalized understanding. Rosa is aware that the meloxicam is replacing the diclofenac, so she will stop this. Informed pt that she just needs to call the pharmacy when she will come in for the shingrix vaccination. Pt verbalized understanding of the above.    KENNETH MirandaN, RN  Rheumatology Care Coordinator  [facility]

## 2021-11-29 NOTE — PROGRESS NOTES
[facility] Rheumatology  Date of Service: 11/29/2021     Referring provider: Dr. Thi Harvey  Referral for: New diagnosis of rheumatoid arthritis. labs are done, CCP, Rheum Factor, CRP and ESR very high, unilateral hand/wrist pain    CC: hand pain    HPI: Rosa is not sure when the pain in her hands began but she particularly noticed it as the hand pain and swelling started to interfere with her ability to sleep. Sympoms would come and go by the next morning. She had a particularly severe episode about 1.5 weeks ago friday left had was entirely swollen over the dorsum and red along left thumb. Came on very quickly over dinner and was excruciating. She used some leftover hydrocodone from her recent foot surgery and tried some sleeping pills to no avail. Symptoms persisted continually from that Friday to Monday when she was able to be seen in sports medicine clinic. She had labs sent off and was started on diclofenac with good control of 80% of her symptoms. She still has some pain in the evenings but the swelling has gone down. On Tuesday the first three digits of her right hand started tingling painfully though she did not notice redness or swelling. By Wednesday of last week both hands felt better. She has not had any stomach upset or irritability with diclofenac pills. Her feet and ankles have not acted up and no other joints have been involved. Prior to this episode she was having some neck stiffness but did not have any with this recent episode. Even at the peak of this recent episode she did not notice much morning stiffness aside from the mornings when she had painful hands.      Tried hydrocodone and sleeping pills and couldn't sleep due to the pain. That started Friday night through to Monday, pics are from monday. But Tuesday was better after starting diclofenac pills. Tuesda right first three fingers started tingling, no swelling, no redness. By Wednesday night that was better. No foot problems. Did not  have neck stiffness as far as she knows. Not much morning stiffness currently. Even on days when hands are not swollen though she -- gets better towards end of day.    No BURLESON  No history of miscarriage, no clots  No recent rashes, hair loss, vision changes      ROS: 10 point ROS neg other than the symptoms noted above in the HPI.   ALLERGIES: Patient has no known allergies.   MEDS: personally reviewed    PRIOR RHEUM MEDS: diclofenac BID    PMHx:  No past medical history on file.     PSHx:  Past Surgical History:   Procedure Laterality Date     KNEE SURGERY Left 2018    total knee     STRIP VEIN  1992       SocHx:  Social History     Tobacco Use     Smoking status: Never Smoker     Smokeless tobacco: Never Used   Substance Use Topics     Alcohol use: Yes     Comment: 3 per week     Drug use: No        FamHx:  family history includes Deep Vein Thrombosis in her mother; Heart Disease (age of onset: 95) in her mother; Hypertension in her father; Vascular Disease in her sister.     PHYSICAL EXAM  Vitals: /83   Pulse 81   Wt 75.2 kg (165 lb 12.8 oz)   SpO2 96%   Breastfeeding No   BMI 26.77 kg/m    BMI= Body mass index is 26.77 kg/m .   General: NAD  HEENT: face symmetric, no oral erythema or sores, healthy dentition  Skin: no rash on face, neck, hands, no flaking dry skin at first digit, no periungal erythema or capillary dropout  Resp: breathing comfortably on room air  CV: warm, well perfused extremities with even bilateral arm pulses, regular  Neuro: easily moving independently, arms and legs  MSK: heberden's nodes of several DIPs, no swelling noted today at PIPs, synovitis and mild pain at left 3rd MCP, no swelling or synovitis of wrists. Able to make tight fists today. Full ROM of elbows and shoulders without pain. No rheumatoid nodules noted.                LAB REVIEW:  RHEUM RESULTS Latest Ref Rng & Units 3/5/2013 1/12/2017 4/9/2018   ALBUMIN 3.2 - 4.5 g/dL 3.6 - -   ALT 0.0 - 50.0 U/L 20.0 - -   AST  0.0 - 45.0 U/L 23.0 - -   COMPLEMENT C3 81 - 157 mg/dL - - -   COMPLEMENT C4 13 - 39 mg/dL - - -   CREATININE 0.6 - 1.3 mg/dL 0.6 - -   CRP 0.0 - 8.0 mg/L - - -   DNA <10.0 IU/mL - - -   HEMATOCRIT 35.0 - 47.0 % 39.8 - 40.7   HEMOGLOBIN 11.7 - 15.7 g/dL 13.3 - 13.6   HCVAB NR - Nonreactive   Assay performance characteristics have not been established for newborns,   infants, and children   -   WBC 4.0 - 11.0 10e9/L 5.5 - 6.6   RBC 3.8 - 5.2 10e12/L 4.21 - 4.25   RDW % 13.0 - 12.6   MCHC 32.0 - 36.0 g/dL 33.4 - 33.4   MCV 78.0 - 100.0 fL 95 - 96   PLATELET COUNT 150 - 450 10e9/L 284 - 284   RHEUMATOID FACTOR <12 IU/mL - - -   ESR 0 - 30 mm/hr - - -        Lab Results   Component Value Date/Time     (H) 2021 02:37 PM    CCPIGG >340.0 (H) 2021 02:37 PM    DNA 0.8 2021 02:37 PM    SMIGG Negative 2021 02:37 PM    SSAIGG Negative 2021 02:37 PM    SSBIGG Negative 2021 02:37 PM        No results found for: HBCAB, HBCAB, HBCAB, HEPBANG, HEPBANG, HEPBANG, [id number], HBSAB, HBSAB, [id number], [id number], [id number], [id number], [id number], TBRES, TBRES, TBRES, HIV, [id number], [id number], HIVABY, [id number]     IMAGIN21 bilateral hand xray personally reviewed and found to have notable periarticular osteopenia, possibly a very small erosion in ulnar side of middle finger MCP      ASSESSMENT: 70 yo F with new onset seropositive (CCP++RF+) rheumatoid arthritis with periarticular osteopenia of the hands, synovitis of left wrist, left 2nd and 3rd MCPs, and 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     DISCUSSION: We discussed at length the pathophysiology and etiology of rheumatoid arthritis in as far as we know it as a field. We discussed that early aggressive treatment (treat-to-target) is associated with the best outcomes. We discussed plaquenil and methotrexate, their respective benefits and side effects, with the latter noted to be  the cornerstone of RA therapy. Given Rosa's preference for fewer pills and that methotrexate is more important for beneficial long term outcomes than plaquenil, we opted together to start with methotrexate therapy. To further reduce pill burden and provide longer symptom control and reduce risk of gastritis, Rosa will switch from diclofenac, to which she has had an excellent response, to meloxicam. We will attempt to taper or discontinue meloxicam 1 month after starting methotrexate although Rosa understands that may be too soon for methotrexate to have reached peak efficacy and she will simply restart meloxicam daily if needed. If Rosa is persistently symptomatic or cannot discontinue meloxicam at 2-3 months after starting methotrexate, we will discuss addition of plaquenil/sulfasalazine (triple therapy) versus starting a TNF inhibitor.    DIAGNOSIS:  ## dual seropositive RA, CCP++, RF+  ## synovitis of left 2nd and 3rd MCPs, left wrist, and likely right wrist  ## periarticular osteopenia, particularly of left hand    PLAN:  1. Start methotrexate 15 mg once per week after below labs result  2. Start folic acid daily  3. STOP diclofenac  4. Start meloxicam 15 mg once a day, preferably in the evening with dinner  5. Infectious screen labs: Hep C, HIV, Hep B Core and surface antibody, Quantiferon Gold  6. prescription for shingrix sent to her pharmacy    RTC in 3 months    Cayetano An MD, PhD  [facility] Rheumatology     90 minutes spent on the date of encounter doing chart review, history and exam, lab review, documentation and further activities as noted above.

## 2021-11-29 NOTE — LETTER
11/29/2021       RE: Rosa Larson  [address]  [facility] [address]     Dear Colleague,    Thank you for referring your patient, Rosa Larson, to the [facility] RHEUMATOLOGY CLINIC [place] at [facility]. Please see a copy of my visit note below.    [facility] Rheumatology  Date of Service: 11/29/2021     Referring provider: Dr. Thi Harvey  Referral for: New diagnosis of rheumatoid arthritis. labs are done, CCP, Rheum Factor, CRP and ESR very high, unilateral hand/wrist pain    CC: hand pain    HPI: Rosa is not sure when the pain in her hands began but she particularly noticed it as the hand pain and swelling started to interfere with her ability to sleep. Sympoms would come and go by the next morning. She had a particularly severe episode about 1.5 weeks ago friday left had was entirely swollen over the dorsum and red along left thumb. Came on very quickly over dinner and was excruciating. She used some leftover hydrocodone from her recent foot surgery and tried some sleeping pills to no avail. Symptoms persisted continually from that Friday to Monday when she was able to be seen in sports medicine clinic. She had labs sent off and was started on diclofenac with good control of 80% of her symptoms. She still has some pain in the evenings but the swelling has gone down. On Tuesday the first three digits of her right hand started tingling painfully though she did not notice redness or swelling. By Wednesday of last week both hands felt better. She has not had any stomach upset or irritability with diclofenac pills. Her feet and ankles have not acted up and no other joints have been involved. Prior to this episode she was having some neck stiffness but did not have any with this recent episode. Even at the peak of this recent episode she did not notice much morning stiffness aside from the mornings when she had painful hands.      Tried  hydrocodone and sleeping pills and couldn't sleep due to the pain. That started Friday night through to Monday, pics are from monday. But Tuesday was better after starting diclofenac pills. Tuesda right first three fingers started tingling, no swelling, no redness. By Wednesday night that was better. No foot problems. Did not have neck stiffness as far as she knows. Not much morning stiffness currently. Even on days when hands are not swollen though she -- gets better towards end of day.    No BURLESON  No history of miscarriage, no clots  No recent rashes, hair loss, vision changes      ROS: 10 point ROS neg other than the symptoms noted above in the HPI.   ALLERGIES: Patient has no known allergies.   MEDS: personally reviewed    PRIOR RHEUM MEDS: diclofenac BID    PMHx:  No past medical history on file.     PSHx:  Past Surgical History:   Procedure Laterality Date     KNEE SURGERY Left 2018    total knee     STRIP VEIN  1992       SocHx:  Social History     Tobacco Use     Smoking status: Never Smoker     Smokeless tobacco: Never Used   Substance Use Topics     Alcohol use: Yes     Comment: 3 per week     Drug use: No        FamHx:  family history includes Deep Vein Thrombosis in her mother; Heart Disease (age of onset: 95) in her mother; Hypertension in her father; Vascular Disease in her sister.     PHYSICAL EXAM  Vitals: /83   Pulse 81   Wt 75.2 kg (165 lb 12.8 oz)   SpO2 96%   Breastfeeding No   BMI 26.77 kg/m    BMI= Body mass index is 26.77 kg/m .   General: NAD  HEENT: face symmetric, no oral erythema or sores, healthy dentition  Skin: no rash on face, neck, hands, no flaking dry skin at first digit, no periungal erythema or capillary dropout  Resp: breathing comfortably on room air  CV: warm, well perfused extremities with even bilateral arm pulses, regular  Neuro: easily moving independently, arms and legs  MSK: heberden's nodes of several DIPs, no swelling noted today at PIPs, synovitis and mild  pain at left 3rd MCP, no swelling or synovitis of wrists. Able to make tight fists today. Full ROM of elbows and shoulders without pain. No rheumatoid nodules noted.                LAB REVIEW:  RHEUM RESULTS Latest Ref Rng & Units 3/5/2013 2017 2018   ALBUMIN 3.2 - 4.5 g/dL 3.6 - -   ALT 0.0 - 50.0 U/L 20.0 - -   AST 0.0 - 45.0 U/L 23.0 - -   COMPLEMENT C3 81 - 157 mg/dL - - -   COMPLEMENT C4 13 - 39 mg/dL - - -   CREATININE 0.6 - 1.3 mg/dL 0.6 - -   CRP 0.0 - 8.0 mg/L - - -   DNA <10.0 IU/mL - - -   HEMATOCRIT 35.0 - 47.0 % 39.8 - 40.7   HEMOGLOBIN 11.7 - 15.7 g/dL 13.3 - 13.6   HCVAB NR - Nonreactive   Assay performance characteristics have not been established for newborns,   infants, and children   -   WBC 4.0 - 11.0 10e9/L 5.5 - 6.6   RBC 3.8 - 5.2 10e12/L 4.21 - 4.25   RDW % 13.0 - 12.6   MCHC 32.0 - 36.0 g/dL 33.4 - 33.4   MCV 78.0 - 100.0 fL 95 - 96   PLATELET COUNT 150 - 450 10e9/L 284 - 284   RHEUMATOID FACTOR <12 IU/mL - - -   ESR 0 - 30 mm/hr - - -        Lab Results   Component Value Date/Time     (H) 2021 02:37 PM    CCPIGG >340.0 (H) 2021 02:37 PM    DNA 0.8 2021 02:37 PM    SMIGG Negative 2021 02:37 PM    SSAIGG Negative 2021 02:37 PM    SSBIGG Negative 2021 02:37 PM        No results found for: HBCAB, HBCAB, HBCAB, HEPBANG, HEPBANG, HEPBANG, [id number], HBSAB, HBSAB, [id number], [id number], [id number], [id number], [id number], TBRES, TBRES, TBRES, HIV, [id number], [id number], HIVABY, [id number]     IMAGIN21 bilateral hand xray personally reviewed and found to have notable periarticular osteopenia, possibly a very small erosion in ulnar side of middle finger MCP      ASSESSMENT: 72 yo F with new onset seropositive (CCP++RF+) rheumatoid arthritis with periarticular osteopenia of the hands, synovitis of left wrist, left 2nd and 3rd MCPs, and 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     DISCUSSION:  We discussed at length the pathophysiology and etiology of rheumatoid arthritis in as far as we know it as a field. We discussed that early aggressive treatment (treat-to-target) is associated with the best outcomes. We discussed plaquenil and methotrexate, their respective benefits and side effects, with the latter noted to be the cornerstone of RA therapy. Given Rosa's preference for fewer pills and that methotrexate is more important for beneficial long term outcomes than plaquenil, we opted together to start with methotrexate therapy. To further reduce pill burden and provide longer symptom control and reduce risk of gastritis, Rosa will switch from diclofenac, to which she has had an excellent response, to meloxicam. We will attempt to taper or discontinue meloxicam 1 month after starting methotrexate although Rosa understands that may be too soon for methotrexate to have reached peak efficacy and she will simply restart meloxicam daily if needed. If Rosa is persistently symptomatic or cannot discontinue meloxicam at 2-3 months after starting methotrexate, we will discuss addition of plaquenil/sulfasalazine (triple therapy) versus starting a TNF inhibitor.    DIAGNOSIS:  ## dual seropositive RA, CCP++, RF+  ## synovitis of left 2nd and 3rd MCPs, left wrist, and likely right wrist  ## periarticular osteopenia, particularly of left hand    PLAN:  1. Start methotrexate 15 mg once per week after below labs result  2. Start folic acid daily  3. STOP diclofenac  4. Start meloxicam 15 mg once a day, preferably in the evening with dinner  5. Infectious screen labs: Hep C, HIV, Hep B Core and surface antibody, Quantiferon Gold  6. prescription for shingrix sent to her pharmacy    RTC in 3 months    Cayetano An MD, PhD  [facility] Rheumatology     90 minutes spent on the date of encounter doing chart review, history and exam, lab review, documentation and further activities as noted above.

## 2021-11-29 NOTE — PATIENT INSTRUCTIONS
Thank you for coming in! See [employer] website for additional information.    While on methotrexate please limit to 3 or less standard drinks per week.    STOP diclofenac  START meloxicam (NSAID) -- take every day for 30 adys in the evening with a meal. Then start taking it as needed (to see if you still need it)  START methotrexate - take 6 tablets (15 mg) once per WEEK.   START folic acid 1 mg each day     Watch for:  -- GI upset  -- mouth sores  -- any rash  -- always contact me with anything that could be a side effect    See you in 3 months    In general, if you are put on antibiotics do not take your methotrexate while you are on antibiotics. If you are getting a vaccine and your disease is not active you can hold 1 dose of methotrexate after the vaccine.    Try to get the shingrix vaccine series soon. You do not have to hold methotrexate for this one.

## 2021-11-29 NOTE — TELEPHONE ENCOUNTER
Contacted pt's pharmacy to clarification of the concern. They wanted to verify that Dr An was aware of the potential adverse reactions when taking meloxicam and methotrexate. Told them that Dr An was aware of this.    Did message Dr An and was told that yes she is aware of potential adverse reaction between the above medications. She also states the methotrexate is replacing the diclofenac.    KENNETH MirandaN, RN  Rheumatology Care Coordinator  [facility]

## 2021-11-30 ENCOUNTER — LAB (OUTPATIENT)
Dept: LAB | Facility: CLINIC | Age: 71
End: 2021-11-30
Payer: MEDICARE

## 2021-11-30 DIAGNOSIS — Z11.59 ENCOUNTER FOR SCREENING FOR OTHER VIRAL DISEASES: ICD-10-CM

## 2021-11-30 DIAGNOSIS — Z79.899 HIGH RISK MEDICATION USE: ICD-10-CM

## 2021-11-30 LAB
HBV CORE AB SERPL QL IA: NONREACTIVE
HBV SURFACE AB SERPL IA-ACNC: 0.45 M[IU]/ML
HIV 1+2 AB+HIV1 P24 AG SERPL QL IA: NONREACTIVE

## 2021-11-30 PROCEDURE — 86706 HEP B SURFACE ANTIBODY: CPT | Performed by: STUDENT IN AN ORGANIZED HEALTH CARE EDUCATION/TRAINING PROGRAM

## 2021-11-30 PROCEDURE — 86704 HEP B CORE ANTIBODY TOTAL: CPT | Performed by: STUDENT IN AN ORGANIZED HEALTH CARE EDUCATION/TRAINING PROGRAM

## 2021-11-30 PROCEDURE — 36415 COLL VENOUS BLD VENIPUNCTURE: CPT | Performed by: PATHOLOGY

## 2021-11-30 PROCEDURE — 87389 HIV-1 AG W/HIV-1&-2 AB AG IA: CPT | Performed by: STUDENT IN AN ORGANIZED HEALTH CARE EDUCATION/TRAINING PROGRAM

## 2021-11-30 PROCEDURE — 86481 TB AG RESPONSE T-CELL SUSP: CPT | Performed by: STUDENT IN AN ORGANIZED HEALTH CARE EDUCATION/TRAINING PROGRAM

## 2021-12-01 LAB
GAMMA INTERFERON BACKGROUND BLD IA-ACNC: 0 IU/ML
M TB IFN-G BLD-IMP: NEGATIVE
M TB IFN-G CD4+ BCKGRND COR BLD-ACNC: 10 IU/ML
MITOGEN IGNF BCKGRD COR BLD-ACNC: 0 IU/ML
MITOGEN IGNF BCKGRD COR BLD-ACNC: 0 IU/ML
QUANTIFERON MITOGEN: 10 IU/ML
QUANTIFERON NIL TUBE: 0 IU/ML
QUANTIFERON TB1 TUBE: 0 IU/ML
QUANTIFERON TB2 TUBE: 0

## 2022-01-04 ENCOUNTER — MYC MEDICAL ADVICE (OUTPATIENT)
Dept: RHEUMATOLOGY | Facility: CLINIC | Age: 72
End: 2022-01-04
Payer: MEDICARE

## 2022-01-05 NOTE — TELEPHONE ENCOUNTER
Called patient and clarified that she meant she was going to try and take MELOXICAM every other day and has been taking MELOXICAM daily for the past 30 days.    She takes methotrexate once per week. She does have persistent wrist strain with activity but has not had another painful night nor a red swollen joint since starting methotrexate.    We discussed staying at the same dose of methotrexate for the next month to give it more time to achieve maximal effect. She will play around with MELOXICAM dosing (try taking it every other day or every three days) to see if it makes a difference for persistent symptoms.    Cayetano An MD, PhD

## 2022-01-20 DIAGNOSIS — M05.742 RHEUMATOID ARTHRITIS INVOLVING BOTH HANDS WITH POSITIVE RHEUMATOID FACTOR (H): ICD-10-CM

## 2022-01-20 DIAGNOSIS — M05.741 RHEUMATOID ARTHRITIS INVOLVING BOTH HANDS WITH POSITIVE RHEUMATOID FACTOR (H): ICD-10-CM

## 2022-01-25 NOTE — TELEPHONE ENCOUNTER
Meloxicam 15 mg once a day    Last Written Prescription Date:  11/29/2021  Last Fill Quantity: 60,   # refills: 0  Last Office Visit : 11/29/2021  Future Office visit:  2/28/2022    Routing refill request to provider for review/approval because:  Failed protocol

## 2022-01-26 RX ORDER — MELOXICAM 15 MG/1
15 TABLET ORAL DAILY
Qty: 90 TABLET | Refills: 0 | Status: SHIPPED | OUTPATIENT
Start: 2022-01-26 | End: 2022-04-25

## 2022-02-01 ENCOUNTER — MYC MEDICAL ADVICE (OUTPATIENT)
Dept: RHEUMATOLOGY | Facility: CLINIC | Age: 72
End: 2022-02-01
Payer: MEDICARE

## 2022-02-01 ENCOUNTER — TELEPHONE (OUTPATIENT)
Dept: RHEUMATOLOGY | Facility: CLINIC | Age: 72
End: 2022-02-01
Payer: MEDICARE

## 2022-02-01 DIAGNOSIS — M05.741 RHEUMATOID ARTHRITIS INVOLVING BOTH HANDS WITH POSITIVE RHEUMATOID FACTOR (H): Primary | ICD-10-CM

## 2022-02-01 DIAGNOSIS — M05.742 RHEUMATOID ARTHRITIS INVOLVING BOTH HANDS WITH POSITIVE RHEUMATOID FACTOR (H): Primary | ICD-10-CM

## 2022-02-01 RX ORDER — PREDNISONE 5 MG/1
TABLET ORAL
Qty: 33 TABLET | Refills: 0 | Status: SHIPPED | OUTPATIENT
Start: 2022-02-01 | End: 2022-02-01

## 2022-02-01 RX ORDER — PREDNISONE 5 MG/1
TABLET ORAL
Qty: 33 TABLET | Refills: 0 | Status: SHIPPED | OUTPATIENT
Start: 2022-02-01 | End: 2022-02-21

## 2022-02-01 NOTE — TELEPHONE ENCOUNTER
[facility] Call Center    Phone Message    May a detailed message be left on voicemail: yes     Reason for Call: Symptoms or Concerns     If patient has red-flag symptoms, warm transfer to triage line    Current symptom or concern: Hand pain and swelling    Symptoms have been present for:  1 day(s)    Has patient previously been seen for this? Yes    By Dr. An        Are there any new or worsening symptoms? Yes      Action Taken: Other: [facility]    Travel Screening: Not Applicable

## 2022-02-14 DIAGNOSIS — M05.742 RHEUMATOID ARTHRITIS INVOLVING BOTH HANDS WITH POSITIVE RHEUMATOID FACTOR (H): ICD-10-CM

## 2022-02-14 DIAGNOSIS — M05.741 RHEUMATOID ARTHRITIS INVOLVING BOTH HANDS WITH POSITIVE RHEUMATOID FACTOR (H): ICD-10-CM

## 2022-02-14 DIAGNOSIS — Z79.899 HIGH RISK MEDICATION USE: Primary | ICD-10-CM

## 2022-02-15 ENCOUNTER — TELEPHONE (OUTPATIENT)
Dept: RHEUMATOLOGY | Facility: CLINIC | Age: 72
End: 2022-02-15
Payer: MEDICARE

## 2022-02-15 NOTE — TELEPHONE ENCOUNTER
[facility] Call Center    Phone Message    May a detailed message be left on voicemail: no     Reason for Call: Order(s): Other:   Reason for requested: lab orders  Date needed: 2/16 - patient has lab scheduled  Provider name: Dr. An      Action Taken: Message routed to:  [facility] Rheum    Travel Screening: Not Applicable

## 2022-02-15 NOTE — TELEPHONE ENCOUNTER
Message left for pt to return call or check her [employer] messages.   Detailed message sent to pt via [employer] informing her labs would be needed before this medication would be refilled, and asking her to call to schedule.       Pt has the option of scheduling her lab visit on [employer] as well, so can we please make sure the orders are entered for her tests? I don't see these lab tests ordered as 'future' or 'standing' for her.   Thank you!   Routed to [facility] RHEUMATOLOGY ADULT [facility]

## 2022-02-15 NOTE — TELEPHONE ENCOUNTER
Spoke with patient regarding refill request. Patient stated that she is scheduled for labs tomorrow morning and lab orders have been pended and routed to provider for review and sign.     Patient also has questions regarding the next steps after she is finished with the prednisone. She is currently taking one 5mg tablet once a day and has 5 tablets left. Patient is wondering if she is to go back on the meloxicam or what she should do.     Patient also is wondering if provider recommends a different dose of the methotrexate.     I let patient know that I will route her questions to the provider.    Rosa M Fernández CMA   2/15/2022 11:22 AM

## 2022-02-16 ENCOUNTER — LAB (OUTPATIENT)
Dept: LAB | Facility: CLINIC | Age: 72
End: 2022-02-16
Attending: STUDENT IN AN ORGANIZED HEALTH CARE EDUCATION/TRAINING PROGRAM
Payer: MEDICARE

## 2022-02-16 DIAGNOSIS — M05.742 RHEUMATOID ARTHRITIS INVOLVING BOTH HANDS WITH POSITIVE RHEUMATOID FACTOR (H): ICD-10-CM

## 2022-02-16 DIAGNOSIS — Z79.899 HIGH RISK MEDICATION USE: ICD-10-CM

## 2022-02-16 DIAGNOSIS — M05.741 RHEUMATOID ARTHRITIS INVOLVING BOTH HANDS WITH POSITIVE RHEUMATOID FACTOR (H): ICD-10-CM

## 2022-02-16 LAB
ALBUMIN SERPL-MCNC: 3.7 G/DL (ref 3.4–5)
ALT SERPL W P-5'-P-CCNC: 20 U/L (ref 0–50)
AST SERPL W P-5'-P-CCNC: 11 U/L (ref 0–45)
BASOPHILS # BLD AUTO: 0.1 10E3/UL (ref 0–0.2)
BASOPHILS NFR BLD AUTO: 1 %
CREAT SERPL-MCNC: 0.51 MG/DL (ref 0.52–1.04)
EOSINOPHIL # BLD AUTO: 0.1 10E3/UL (ref 0–0.7)
EOSINOPHIL NFR BLD AUTO: 1 %
ERYTHROCYTE [DISTWIDTH] IN BLOOD BY AUTOMATED COUNT: 13.6 % (ref 10–15)
GFR SERPL CREATININE-BSD FRML MDRD: >90 ML/MIN/1.73M2
HCT VFR BLD AUTO: 39.4 % (ref 35–47)
HGB BLD-MCNC: 12.9 G/DL (ref 11.7–15.7)
IMM GRANULOCYTES # BLD: 0 10E3/UL
IMM GRANULOCYTES NFR BLD: 0 %
LYMPHOCYTES # BLD AUTO: 2.1 10E3/UL (ref 0.8–5.3)
LYMPHOCYTES NFR BLD AUTO: 32 %
MCH RBC QN AUTO: 31.1 PG (ref 26.5–33)
MCHC RBC AUTO-ENTMCNC: 32.7 G/DL (ref 31.5–36.5)
MCV RBC AUTO: 95 FL (ref 78–100)
MONOCYTES # BLD AUTO: 0.4 10E3/UL (ref 0–1.3)
MONOCYTES NFR BLD AUTO: 6 %
NEUTROPHILS # BLD AUTO: 3.9 10E3/UL (ref 1.6–8.3)
NEUTROPHILS NFR BLD AUTO: 60 %
NRBC # BLD AUTO: 0 10E3/UL
NRBC BLD AUTO-RTO: 0 /100
PLATELET # BLD AUTO: 285 10E3/UL (ref 150–450)
RBC # BLD AUTO: 4.15 10E6/UL (ref 3.8–5.2)
WBC # BLD AUTO: 6.6 10E3/UL (ref 4–11)

## 2022-02-16 PROCEDURE — 82040 ASSAY OF SERUM ALBUMIN: CPT | Performed by: PATHOLOGY

## 2022-02-16 PROCEDURE — 82565 ASSAY OF CREATININE: CPT | Performed by: PATHOLOGY

## 2022-02-16 PROCEDURE — 36415 COLL VENOUS BLD VENIPUNCTURE: CPT | Performed by: PATHOLOGY

## 2022-02-16 PROCEDURE — 84450 TRANSFERASE (AST) (SGOT): CPT | Performed by: PATHOLOGY

## 2022-02-16 PROCEDURE — 85025 COMPLETE CBC W/AUTO DIFF WBC: CPT | Performed by: PATHOLOGY

## 2022-02-16 PROCEDURE — 84460 ALANINE AMINO (ALT) (SGPT): CPT | Performed by: PATHOLOGY

## 2022-02-17 NOTE — TELEPHONE ENCOUNTER
Medication: methotrexate 2.5 MG         Last Written Prescription Date:  11/29/2021  Last Fill Quantity: 24,   # refills: 2  Last Office Visit:  11/29/2021  Next Office Visit:  2/28/2022      CBC RESULTS: Recent Labs   Lab Test 02/16/22  0748   WBC 6.6   RBC 4.15   HGB 12.9   HCT 39.4   MCV 95   MCH 31.1   MCHC 32.7   RDW 13.6          Creatinine   Date Value Ref Range Status   02/16/2022 0.51 (L) 0.52 - 1.04 mg/dL Final   03/05/2013 0.6 0.6 - 1.3 mg/dL Final   ]    Liver Function Studies -   Recent Labs   Lab Test 02/16/22  0748   ALBUMIN 3.7   AST 11   ALT 20       Routing refill request to provider for review/approval   Rosa M Fernández CMA   2/17/2022 8:50 AM

## 2022-02-22 ENCOUNTER — MYC MEDICAL ADVICE (OUTPATIENT)
Dept: RHEUMATOLOGY | Facility: CLINIC | Age: 72
End: 2022-02-22
Payer: MEDICARE

## 2022-02-22 DIAGNOSIS — M05.741 RHEUMATOID ARTHRITIS INVOLVING BOTH HANDS WITH POSITIVE RHEUMATOID FACTOR (H): ICD-10-CM

## 2022-02-22 DIAGNOSIS — M05.742 RHEUMATOID ARTHRITIS INVOLVING BOTH HANDS WITH POSITIVE RHEUMATOID FACTOR (H): ICD-10-CM

## 2022-02-22 DIAGNOSIS — Z79.899 HIGH RISK MEDICATION USE: ICD-10-CM

## 2022-02-22 NOTE — TELEPHONE ENCOUNTER
Updated Methotrexate to 8 tablets once per week.  Ok to prescribe another 90 day order of meloxicam if she needs it.    Cayetano An MD, PhD

## 2022-02-28 ENCOUNTER — OFFICE VISIT (OUTPATIENT)
Dept: RHEUMATOLOGY | Facility: CLINIC | Age: 72
End: 2022-02-28
Attending: STUDENT IN AN ORGANIZED HEALTH CARE EDUCATION/TRAINING PROGRAM
Payer: MEDICARE

## 2022-02-28 VITALS
SYSTOLIC BLOOD PRESSURE: 132 MMHG | HEIGHT: 67 IN | OXYGEN SATURATION: 100 % | DIASTOLIC BLOOD PRESSURE: 68 MMHG | BODY MASS INDEX: 25.97 KG/M2 | HEART RATE: 88 BPM

## 2022-02-28 DIAGNOSIS — M05.742 RHEUMATOID ARTHRITIS INVOLVING BOTH HANDS WITH POSITIVE RHEUMATOID FACTOR (H): Primary | ICD-10-CM

## 2022-02-28 DIAGNOSIS — Z23 NEED FOR COVID-19 VACCINE: ICD-10-CM

## 2022-02-28 DIAGNOSIS — M05.741 RHEUMATOID ARTHRITIS INVOLVING BOTH HANDS WITH POSITIVE RHEUMATOID FACTOR (H): Primary | ICD-10-CM

## 2022-02-28 PROCEDURE — 91306 HC RX IP 250 OP 636: CPT | Performed by: STUDENT IN AN ORGANIZED HEALTH CARE EDUCATION/TRAINING PROGRAM

## 2022-02-28 PROCEDURE — G0463 HOSPITAL OUTPT CLINIC VISIT: HCPCS | Mod: 25

## 2022-02-28 PROCEDURE — 0064A HC ADMIN COVID VAC MODERNA, 0.25ML BOOSTER: CPT | Performed by: STUDENT IN AN ORGANIZED HEALTH CARE EDUCATION/TRAINING PROGRAM

## 2022-02-28 PROCEDURE — 250N000011 HC RX IP 250 OP 636: Performed by: STUDENT IN AN ORGANIZED HEALTH CARE EDUCATION/TRAINING PROGRAM

## 2022-02-28 PROCEDURE — 99214 OFFICE O/P EST MOD 30 MIN: CPT | Mod: 25 | Performed by: STUDENT IN AN ORGANIZED HEALTH CARE EDUCATION/TRAINING PROGRAM

## 2022-02-28 RX ADMIN — CX-024414 0.25 ML: 0.2 INJECTION, SUSPENSION INTRAMUSCULAR at 13:28

## 2022-02-28 ASSESSMENT — PAIN SCALES - GENERAL: PAINLEVEL: NO PAIN (0)

## 2022-02-28 NOTE — NURSING NOTE
"Chief Complaint   Patient presents with     RECHECK     RA     /68   Pulse 88   Ht 1.702 m (5' 7\")   SpO2 100%   BMI 25.97 kg/m      Lisa Patton MA  "

## 2022-02-28 NOTE — PROGRESS NOTES
[facility] Rheumatology  Last seen: 11/29/2021   DOS: 02/28/22     Referring provider: Dr. Thi Harvey    CC: seropositive RA    Interval history: Since her initial visit, I asked Rosa to increase her methotrexate to 8 tablets (split day dosing) once per week. She has continued her meloxicam. Methotrexate start at the beginning of December. She had a flare in February, for which I prescribed a prednisone taper, and asked her to increase her methotrexate dose from 6 pills a week to 8 pills per week. She has been off prednisone since about a week ago without recurrence of symptoms. She is tolerating methotrexate well and has about an hour or less of morning stiffness in her right wrist. She is using meloxicam about twice a week. Today we discussed that we anticipate further improvement at this dose of methotrexate. 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 We discussed that days when she need to use her wrist a lot are good days to anticipatorily take meloxicam.    HPI: Rosa is not sure when the pain in her hands began but she particularly noticed it as the hand pain and swelling started to interfere with her ability to sleep. Sympoms would come and go by the next morning. She had a particularly severe episode about 1.5 weeks ago friday left had was entirely swollen over the dorsum and red along left thumb. Came on very quickly over dinner and was excruciating. She used some leftover hydrocodone from her recent foot surgery and tried some sleeping pills to no avail. Symptoms persisted continually from that Friday to Monday when she was able to be seen in sports medicine clinic. She had labs sent off and was started on diclofenac with good control of 80% of her symptoms. She still has some pain in the evenings but the swelling has gone down. On Tuesday the first three digits of her right hand started tingling painfully though  "she did not notice redness or swelling. By Wednesday of last week both hands felt better. She has not had any stomach upset or irritability with diclofenac pills. Her feet and ankles have not acted up and no other joints have been involved. Prior to this episode she was having some neck stiffness but did not have any with this recent episode. Even at the peak of this recent episode she did not notice much morning stiffness aside from the mornings when she had painful hands.      Tried hydrocodone and sleeping pills and couldn't sleep due to the pain. That started Friday night through to Monday, pics are from monday. But Tuesday was better after starting diclofenac pills. Tuesda right first three fingers started tingling, no swelling, no redness. By Wednesday night that was better. No foot problems. Did not have neck stiffness as far as she knows. Not much morning stiffness currently. Even on days when hands are not swollen though she -- gets better towards end of day.    No BURLESON  No history of miscarriage, no clots  No recent rashes, hair loss, vision changes      ROS: 10 point ROS neg other than the symptoms noted above in the HPI.   ALLERGIES: Patient has no known allergies.   MEDS: personally reviewed    PRIOR RHEUM MEDS: diclofenac BID    PMHx:  No past medical history on file.     PSHx:  Past Surgical History:   Procedure Laterality Date     KNEE SURGERY Left 2018    total knee     STRIP VEIN  1992       SocHx:  Social History     Tobacco Use     Smoking status: Never Smoker     Smokeless tobacco: Never Used   Substance Use Topics     Alcohol use: Yes     Comment: 3 per week     Drug use: No        FamHx:  family history includes Deep Vein Thrombosis in her mother; Heart Disease (age of onset: 95) in her mother; Hypertension in her father; Vascular Disease in her sister.     PHYSICAL EXAM  Vitals: /68   Pulse 88   Ht 1.702 m (5' 7\")   SpO2 100%   BMI 25.97 kg/m    BMI= Body mass index is 25.97 kg/m . "   General: NAD  HEENT: face symmetric, no oral erythema or sores, healthy dentition  Skin: no rash on face, neck, hands, no flaking dry skin at first digit, no periungal erythema or capillary dropout  Resp: breathing comfortably on room air  CV: warm, well perfused extremities with even bilateral arm pulses, regular  Neuro: easily moving independently, arms and legs  MSK: heberden's nodes of several DIPs, no swelling noted today at PIPs, decreased synovitis and no pain at left 2nd and 3rd MCP, no swelling or synovitis of wrists. Able to make tight fists today. Full ROM of elbows and shoulders without pain. No rheumatoid nodules noted.        LAB REVIEW:  RHEUM RESULTS Latest Ref Rng & Units 3/5/2013 1/12/2017 4/9/2018   ALBUMIN 3.4 - 5.0 g/dL 3.6 - -   ALT 0 - 50 U/L 20.0 - -   AST 0 - 45 U/L 23.0 - -   COMPLEMENT C3 81 - 157 mg/dL - - -   COMPLEMENT C4 13 - 39 mg/dL - - -   CREATININE 0.52 - 1.04 mg/dL 0.6 - -   CRP 0.0 - 8.0 mg/L - - -   DNA <10.0 IU/mL - - -   GFR ESTIMATE >60 mL/min/1.73m2 - - -   HEMATOCRIT 35.0 - 47.0 % 39.8 - 40.7   HEMOGLOBIN 11.7 - 15.7 g/dL 13.3 - 13.6   HCVAB NR - Nonreactive   Assay performance characteristics have not been established for newborns,   infants, and children   -   WBC 4.0 - 11.0 10e3/uL 5.5 - 6.6   RBC 3.80 - 5.20 10e6/uL 4.21 - 4.25   RDW 10.0 - 15.0 % 13.0 - 12.6   MCHC 31.5 - 36.5 g/dL 33.4 - 33.4   MCV 78 - 100 fL 95 - 96   PLATELET COUNT 150 - 450 10e3/uL 284 - 284   RHEUMATOID FACTOR <12 IU/mL - - -   ESR 0 - 30 mm/hr - - -   QUANTIFERON-TB GOLD PLUS RESULT Negative - - -        Lab Results   Component Value Date/Time     (H) 11/22/2021 02:37 PM    CCPIGG >340.0 (H) 11/22/2021 02:37 PM    DNA 0.8 11/22/2021 02:37 PM    SMIGG Negative 11/22/2021 02:37 PM    SSAIGG Negative 11/22/2021 02:37 PM    SSBIGG Negative 11/22/2021 02:37 PM        Lab Results   Component Value Date/Time    HBCAB Nonreactive 11/30/2021 10:11 AM    TBRES Negative 11/30/2021 10:11 AM         IMAGIN21 bilateral hand xray personally reviewed and found to have notable periarticular osteopenia, possibly a very small erosion in ulnar side of middle finger MCP      ASSESSMENT: 72 yo F with seropositive (CCP++RF+) rheumatoid arthritis with periarticular osteopenia of the hands, synovitis of left wrist, left 2nd and 3rd MCPs, and 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 Doing well today on 20 mg weekly methotrexate and as needed meloxicam.    DISCUSSION: Methotrexate was increased to 20 mg weekly at the beginning on February and to date she has been able to discontinue prednisone for ~1 week without return of wrist/hand pain and swelling. Given onset of action of methotrexate, I think she will continue to have improvement at this dose for at least another month so we will watch and monitor. If symptoms return, or if she starts to need meloxicam more than a few times per week, we will escalate therapies.    DIAGNOSIS:  ## dual seropositive RA, CCP++, RF+  ## synovitis of left 2nd and 3rd MCPs, left wrist, and likely right wrist  ## periarticular osteopenia, particularly of left hand    PLAN:  1. Continue methotrexate 20 mg once per week after below labs result  2. Continue folic acid daily  3. Continue meloxicam 15 mg once a day as needed or in anticipation of lots of wrist activity (I.e. baking, kneading)  4. COVID booster today  5. Labs in 3-4 months    RTC in 6 months, sooner if needed    Cayetano An MD, PhD  [facility] Rheumatology     30 minutes spent on the date of encounter doing chart review, history and exam, lab review, documentation and further activities as noted above.

## 2022-02-28 NOTE — LETTER
2/28/2022       RE: Rosa Larson  [address]  [facility] [address]     Dear Colleague,    Thank you for referring your patient, Rosa Larson, to the [facility] RHEUMATOLOGY CLINIC [place] at [facility]. Please see a copy of my visit note below.    [facility] Rheumatology  Last seen: 11/29/2021   DOS: 02/28/22     Referring provider: Dr. Thi Harvey    CC: seropositive RA    Interval history: Since her initial visit, I asked Rosa to increase her methotrexate to 8 tablets (split day dosing) once per week. She has continued her meloxicam. Methotrexate start at the beginning of December. She had a flare in February, for which I prescribed a prednisone taper, and asked her to increase her methotrexate dose from 6 pills a week to 8 pills per week. She has been off prednisone since about a week ago without recurrence of symptoms. She is tolerating methotrexate well and has about an hour or less of morning stiffness in her right wrist. She is using meloxicam about twice a week. Today we discussed that we anticipate further improvement at this dose of methotrexate. 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 We discussed that days when she need to use her wrist a lot are good days to anticipatorily take meloxicam.    HPI: Rosa is not sure when the pain in her hands began but she particularly noticed it as the hand pain and swelling started to interfere with her ability to sleep. Sympoms would come and go by the next morning. She had a particularly severe episode about 1.5 weeks ago friday left had was entirely swollen over the dorsum and red along left thumb. Came on very quickly over dinner and was excruciating. She used some leftover hydrocodone from her recent foot surgery and tried some sleeping pills to no avail. Symptoms persisted continually from that  Friday to Monday when she was able to be seen in sports medicine clinic. She had labs sent off and was started on diclofenac with good control of 80% of her symptoms. She still has some pain in the evenings but the swelling has gone down. On Tuesday the first three digits of her right hand started tingling painfully though she did not notice redness or swelling. By Wednesday of last week both hands felt better. She has not had any stomach upset or irritability with diclofenac pills. Her feet and ankles have not acted up and no other joints have been involved. Prior to this episode she was having some neck stiffness but did not have any with this recent episode. Even at the peak of this recent episode she did not notice much morning stiffness aside from the mornings when she had painful hands.      Tried hydrocodone and sleeping pills and couldn't sleep due to the pain. That started Friday night through to Monday, pics are from monday. But Tuesday was better after starting diclofenac pills. Tuesda right first three fingers started tingling, no swelling, no redness. By Wednesday night that was better. No foot problems. Did not have neck stiffness as far as she knows. Not much morning stiffness currently. Even on days when hands are not swollen though she -- gets better towards end of day.    No BURLESON  No history of miscarriage, no clots  No recent rashes, hair loss, vision changes      ROS: 10 point ROS neg other than the symptoms noted above in the HPI.   ALLERGIES: Patient has no known allergies.   MEDS: personally reviewed    PRIOR RHEUM MEDS: diclofenac BID    PMHx:  No past medical history on file.     PSHx:  Past Surgical History:   Procedure Laterality Date     KNEE SURGERY Left 2018    total knee     STRIP VEIN  1992       SocHx:  Social History     Tobacco Use     Smoking status: Never Smoker     Smokeless tobacco: Never Used   Substance Use Topics     Alcohol use: Yes     Comment: 3 per week     Drug use: No  "       FamHx:  family history includes Deep Vein Thrombosis in her mother; Heart Disease (age of onset: 95) in her mother; Hypertension in her father; Vascular Disease in her sister.     PHYSICAL EXAM  Vitals: /68   Pulse 88   Ht 1.702 m (5' 7\")   SpO2 100%   BMI 25.97 kg/m    BMI= Body mass index is 25.97 kg/m .   General: NAD  HEENT: face symmetric, no oral erythema or sores, healthy dentition  Skin: no rash on face, neck, hands, no flaking dry skin at first digit, no periungal erythema or capillary dropout  Resp: breathing comfortably on room air  CV: warm, well perfused extremities with even bilateral arm pulses, regular  Neuro: easily moving independently, arms and legs  MSK: heberden's nodes of several DIPs, no swelling noted today at PIPs, decreased synovitis and no pain at left 2nd and 3rd MCP, no swelling or synovitis of wrists. Able to make tight fists today. Full ROM of elbows and shoulders without pain. No rheumatoid nodules noted.        LAB REVIEW:  RHEUM RESULTS Latest Ref Rng & Units 3/5/2013 1/12/2017 4/9/2018   ALBUMIN 3.4 - 5.0 g/dL 3.6 - -   ALT 0 - 50 U/L 20.0 - -   AST 0 - 45 U/L 23.0 - -   COMPLEMENT C3 81 - 157 mg/dL - - -   COMPLEMENT C4 13 - 39 mg/dL - - -   CREATININE 0.52 - 1.04 mg/dL 0.6 - -   CRP 0.0 - 8.0 mg/L - - -   DNA <10.0 IU/mL - - -   GFR ESTIMATE >60 mL/min/1.73m2 - - -   HEMATOCRIT 35.0 - 47.0 % 39.8 - 40.7   HEMOGLOBIN 11.7 - 15.7 g/dL 13.3 - 13.6   HCVAB NR - Nonreactive   Assay performance characteristics have not been established for newborns,   infants, and children   -   WBC 4.0 - 11.0 10e3/uL 5.5 - 6.6   RBC 3.80 - 5.20 10e6/uL 4.21 - 4.25   RDW 10.0 - 15.0 % 13.0 - 12.6   MCHC 31.5 - 36.5 g/dL 33.4 - 33.4   MCV 78 - 100 fL 95 - 96   PLATELET COUNT 150 - 450 10e3/uL 284 - 284   RHEUMATOID FACTOR <12 IU/mL - - -   ESR 0 - 30 mm/hr - - -   QUANTIFERON-TB GOLD PLUS RESULT Negative - - -        Lab Results   Component Value Date/Time    F 340 (H) 11/22/2021 " 02:37 PM    CCPIGG >340.0 (H) 2021 02:37 PM    DNA 0.8 2021 02:37 PM    SMIGG Negative 2021 02:37 PM    SSAIGG Negative 2021 02:37 PM    SSBIGG Negative 2021 02:37 PM        Lab Results   Component Value Date/Time    HBCAB Nonreactive 2021 10:11 AM    TBRES Negative 2021 10:11 AM     IMAGIN21 bilateral hand xray personally reviewed and found to have notable periarticular osteopenia, possibly a very small erosion in ulnar side of middle finger MCP    ASSESSMENT: 70 yo F with seropositive (CCP++RF+) rheumatoid arthritis with periarticular osteopenia of the hands, synovitis of left wrist, left 2nd and 3rd MCPs, and 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 Doing well today on 20 mg weekly methotrexate and as needed meloxicam.    DISCUSSION: Methotrexate was increased to 20 mg weekly at the beginning on February and to date she has been able to discontinue prednisone for ~1 week without return of wrist/hand pain and swelling. Given onset of action of methotrexate, I think she will continue to have improvement at this dose for at least another month so we will watch and monitor. If symptoms return, or if she starts to need meloxicam more than a few times per week, we will escalate therapies.    DIAGNOSIS:  ## dual seropositive RA, CCP++, RF+  ## synovitis of left 2nd and 3rd MCPs, left wrist, and likely right wrist  ## periarticular osteopenia, particularly of left hand    PLAN:  1. Continue methotrexate 20 mg once per week after below labs result  2. Continue folic acid daily  3. Continue meloxicam 15 mg once a day as needed or in anticipation of lots of wrist activity (I.e. baking, kneading)  4. COVID booster today  5. Labs in 3-4 months    RTC in 6 months, sooner if needed    Cayetano An MD, PhD  [facility] Rheumatology     30 minutes spent on the date of encounter doing chart review, history and exam,  lab review, documentation and further activities as noted above.

## 2022-02-28 NOTE — LETTER
2/28/2022      RE: Rosa Larson  [address]  [facility] [address]       [facility]umatology  Last seen: 11/29/2021   DOS: 02/28/22     Referring provider: Dr. Thi Harvey    CC: seropositive RA    Interval history: Since her initial visit, I asked Rosa to increase her methotrexate to 8 tablets (split day dosing) once per week. She has continued her meloxicam. Methotrexate start at the beginning of December. She had a flare in February, for which I prescribed a prednisone taper, and asked her to increase her methotrexate dose from 6 pills a week to 8 pills per week. She has been off prednisone since about a week ago without recurrence of symptoms. She is tolerating methotrexate well and has about an hour or less of morning stiffness in her right wrist. She is using meloxicam about twice a week. Today we discussed that we anticipate further improvement at this dose of methotrexate. 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 We discussed that days when she need to use her wrist a lot are good days to anticipatorily take meloxicam.    HPI: Rosa is not sure when the pain in her hands began but she particularly noticed it as the hand pain and swelling started to interfere with her ability to sleep. Sympoms would come and go by the next morning. She had a particularly severe episode about 1.5 weeks ago friday left had was entirely swollen over the dorsum and red along left thumb. Came on very quickly over dinner and was excruciating. She used some leftover hydrocodone from her recent foot surgery and tried some sleeping pills to no avail. Symptoms persisted continually from that Friday to Monday when she was able to be seen in sports medicine clinic. She had labs sent off and was started on diclofenac with good control of 80% of her symptoms. She still has some pain in the evenings but the swelling has gone down.  On Tuesday the first three digits of her right hand started tingling painfully though she did not notice redness or swelling. By Wednesday of last week both hands felt better. She has not had any stomach upset or irritability with diclofenac pills. Her feet and ankles have not acted up and no other joints have been involved. Prior to this episode she was having some neck stiffness but did not have any with this recent episode. Even at the peak of this recent episode she did not notice much morning stiffness aside from the mornings when she had painful hands.      Tried hydrocodone and sleeping pills and couldn't sleep due to the pain. That started Friday night through to Monday, pics are from monday. But Tuesday was better after starting diclofenac pills. Tuesda right first three fingers started tingling, no swelling, no redness. By Wednesday night that was better. No foot problems. Did not have neck stiffness as far as she knows. Not much morning stiffness currently. Even on days when hands are not swollen though she -- gets better towards end of day.    No BURLESON  No history of miscarriage, no clots  No recent rashes, hair loss, vision changes      ROS: 10 point ROS neg other than the symptoms noted above in the HPI.   ALLERGIES: Patient has no known allergies.   MEDS: personally reviewed    PRIOR RHEUM MEDS: diclofenac BID    PMHx:  No past medical history on file.     PSHx:  Past Surgical History:   Procedure Laterality Date     KNEE SURGERY Left 2018    total knee     STRIP VEIN  1992       SocHx:  Social History     Tobacco Use     Smoking status: Never Smoker     Smokeless tobacco: Never Used   Substance Use Topics     Alcohol use: Yes     Comment: 3 per week     Drug use: No        FamHx:  family history includes Deep Vein Thrombosis in her mother; Heart Disease (age of onset: 95) in her mother; Hypertension in her father; Vascular Disease in her sister.     PHYSICAL EXAM  Vitals: /68   Pulse 88   Ht  "1.702 m (5' 7\")   SpO2 100%   BMI 25.97 kg/m    BMI= Body mass index is 25.97 kg/m .   General: NAD  HEENT: face symmetric, no oral erythema or sores, healthy dentition  Skin: no rash on face, neck, hands, no flaking dry skin at first digit, no periungal erythema or capillary dropout  Resp: breathing comfortably on room air  CV: warm, well perfused extremities with even bilateral arm pulses, regular  Neuro: easily moving independently, arms and legs  MSK: heberden's nodes of several DIPs, no swelling noted today at PIPs, decreased synovitis and no pain at left 2nd and 3rd MCP, no swelling or synovitis of wrists. Able to make tight fists today. Full ROM of elbows and shoulders without pain. No rheumatoid nodules noted.        LAB REVIEW:  RHEUM RESULTS Latest Ref Rng & Units 3/5/2013 1/12/2017 4/9/2018   ALBUMIN 3.4 - 5.0 g/dL 3.6 - -   ALT 0 - 50 U/L 20.0 - -   AST 0 - 45 U/L 23.0 - -   COMPLEMENT C3 81 - 157 mg/dL - - -   COMPLEMENT C4 13 - 39 mg/dL - - -   CREATININE 0.52 - 1.04 mg/dL 0.6 - -   CRP 0.0 - 8.0 mg/L - - -   DNA <10.0 IU/mL - - -   GFR ESTIMATE >60 mL/min/1.73m2 - - -   HEMATOCRIT 35.0 - 47.0 % 39.8 - 40.7   HEMOGLOBIN 11.7 - 15.7 g/dL 13.3 - 13.6   HCVAB NR - Nonreactive   Assay performance characteristics have not been established for newborns,   infants, and children   -   WBC 4.0 - 11.0 10e3/uL 5.5 - 6.6   RBC 3.80 - 5.20 10e6/uL 4.21 - 4.25   RDW 10.0 - 15.0 % 13.0 - 12.6   MCHC 31.5 - 36.5 g/dL 33.4 - 33.4   MCV 78 - 100 fL 95 - 96   PLATELET COUNT 150 - 450 10e3/uL 284 - 284   RHEUMATOID FACTOR <12 IU/mL - - -   ESR 0 - 30 mm/hr - - -   QUANTIFERON-TB GOLD PLUS RESULT Negative - - -        Lab Results   Component Value Date/Time     (H) 11/22/2021 02:37 PM    CCPIGG >340.0 (H) 11/22/2021 02:37 PM    DNA 0.8 11/22/2021 02:37 PM    SMIGG Negative 11/22/2021 02:37 PM    SSAIGG Negative 11/22/2021 02:37 PM    SSBIGG Negative 11/22/2021 02:37 PM        Lab Results   Component Value " Date/Time    HBCAB Nonreactive 2021 10:11 AM    TBRES Negative 2021 10:11 AM        IMAGIN21 bilateral hand xray personally reviewed and found to have notable periarticular osteopenia, possibly a very small erosion in ulnar side of middle finger MCP      ASSESSMENT: 72 yo F with seropositive (CCP++RF+) rheumatoid arthritis with periarticular osteopenia of the hands, synovitis of left wrist, left 2nd and 3rd MCPs, and 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 Doing well today on 20 mg weekly methotrexate and as needed meloxicam.    DISCUSSION: Methotrexate was increased to 20 mg weekly at the beginning on February and to date she has been able to discontinue prednisone for ~1 week without return of wrist/hand pain and swelling. Given onset of action of methotrexate, I think she will continue to have improvement at this dose for at least another month so we will watch and monitor. If symptoms return, or if she starts to need meloxicam more than a few times per week, we will escalate therapies.    DIAGNOSIS:  ## dual seropositive RA, CCP++, RF+  ## synovitis of left 2nd and 3rd MCPs, left wrist, and likely right wrist  ## periarticular osteopenia, particularly of left hand    PLAN:  1. Continue methotrexate 20 mg once per week after below labs result  2. Continue folic acid daily  3. Continue meloxicam 15 mg once a day as needed or in anticipation of lots of wrist activity (I.e. baking, kneading)  4. COVID booster today  5. Labs in 3-4 months    RTC in 6 months, sooner if needed    Cayetano An MD, PhD  [facility] Rheumatology     30 minutes spent on the date of encounter doing chart review, history and exam, lab review, documentation and further activities as noted above.

## 2022-04-25 DIAGNOSIS — M05.741 RHEUMATOID ARTHRITIS INVOLVING BOTH HANDS WITH POSITIVE RHEUMATOID FACTOR (H): ICD-10-CM

## 2022-04-25 DIAGNOSIS — M05.742 RHEUMATOID ARTHRITIS INVOLVING BOTH HANDS WITH POSITIVE RHEUMATOID FACTOR (H): ICD-10-CM

## 2022-04-27 NOTE — TELEPHONE ENCOUNTER
meloxicam (MOBIC) 15 MG tablet      Last Written Prescription Date:  1-26-22  Last Fill Quantity: 90,   # refills: 0  Last Office Visit : 2-28-22  Future Office visit:  8-29-22    Routing refill request to provider for review/approval because:  Failed protocol : AGE

## 2022-04-28 RX ORDER — MELOXICAM 15 MG/1
15 TABLET ORAL DAILY PRN
Qty: 90 TABLET | Refills: 1 | Status: SHIPPED | OUTPATIENT
Start: 2022-04-28 | End: 2022-11-23

## 2022-06-05 ENCOUNTER — HEALTH MAINTENANCE LETTER (OUTPATIENT)
Age: 72
End: 2022-06-05

## 2022-08-23 ENCOUNTER — LAB (OUTPATIENT)
Dept: LAB | Facility: CLINIC | Age: 72
End: 2022-08-23
Payer: MEDICARE

## 2022-08-23 DIAGNOSIS — Z79.899 HIGH RISK MEDICATION USE: ICD-10-CM

## 2022-08-23 DIAGNOSIS — M05.742 RHEUMATOID ARTHRITIS INVOLVING BOTH HANDS WITH POSITIVE RHEUMATOID FACTOR (H): ICD-10-CM

## 2022-08-23 DIAGNOSIS — M05.741 RHEUMATOID ARTHRITIS INVOLVING BOTH HANDS WITH POSITIVE RHEUMATOID FACTOR (H): ICD-10-CM

## 2022-08-23 LAB
ALBUMIN SERPL-MCNC: 3.7 G/DL (ref 3.4–5)
ALT SERPL W P-5'-P-CCNC: 29 U/L (ref 0–50)
AST SERPL W P-5'-P-CCNC: 19 U/L (ref 0–45)
BASOPHILS # BLD AUTO: 0.1 10E3/UL (ref 0–0.2)
BASOPHILS NFR BLD AUTO: 1 %
CREAT SERPL-MCNC: 0.51 MG/DL (ref 0.52–1.04)
EOSINOPHIL # BLD AUTO: 0.2 10E3/UL (ref 0–0.7)
EOSINOPHIL NFR BLD AUTO: 3 %
ERYTHROCYTE [DISTWIDTH] IN BLOOD BY AUTOMATED COUNT: 13.4 % (ref 10–15)
GFR SERPL CREATININE-BSD FRML MDRD: >90 ML/MIN/1.73M2
HCT VFR BLD AUTO: 38.8 % (ref 35–47)
HGB BLD-MCNC: 13 G/DL (ref 11.7–15.7)
LYMPHOCYTES # BLD AUTO: 2 10E3/UL (ref 0.8–5.3)
LYMPHOCYTES NFR BLD AUTO: 39 %
MCH RBC QN AUTO: 32.8 PG (ref 26.5–33)
MCHC RBC AUTO-ENTMCNC: 33.5 G/DL (ref 31.5–36.5)
MCV RBC AUTO: 98 FL (ref 78–100)
MONOCYTES # BLD AUTO: 0.4 10E3/UL (ref 0–1.3)
MONOCYTES NFR BLD AUTO: 8 %
NEUTROPHILS # BLD AUTO: 2.6 10E3/UL (ref 1.6–8.3)
NEUTROPHILS NFR BLD AUTO: 50 %
PLATELET # BLD AUTO: 272 10E3/UL (ref 150–450)
RBC # BLD AUTO: 3.96 10E6/UL (ref 3.8–5.2)
WBC # BLD AUTO: 5.2 10E3/UL (ref 4–11)

## 2022-08-23 PROCEDURE — 36415 COLL VENOUS BLD VENIPUNCTURE: CPT

## 2022-08-23 PROCEDURE — 82040 ASSAY OF SERUM ALBUMIN: CPT

## 2022-08-23 PROCEDURE — 85025 COMPLETE CBC W/AUTO DIFF WBC: CPT

## 2022-08-23 PROCEDURE — 82565 ASSAY OF CREATININE: CPT

## 2022-08-23 PROCEDURE — 84460 ALANINE AMINO (ALT) (SGPT): CPT

## 2022-08-23 PROCEDURE — 84450 TRANSFERASE (AST) (SGOT): CPT

## 2022-08-29 ENCOUNTER — OFFICE VISIT (OUTPATIENT)
Dept: RHEUMATOLOGY | Facility: CLINIC | Age: 72
End: 2022-08-29
Attending: STUDENT IN AN ORGANIZED HEALTH CARE EDUCATION/TRAINING PROGRAM
Payer: MEDICARE

## 2022-08-29 VITALS
BODY MASS INDEX: 25.97 KG/M2 | OXYGEN SATURATION: 94 % | DIASTOLIC BLOOD PRESSURE: 62 MMHG | HEART RATE: 102 BPM | HEIGHT: 67 IN | SYSTOLIC BLOOD PRESSURE: 122 MMHG | TEMPERATURE: 98.3 F

## 2022-08-29 DIAGNOSIS — Z79.899 HIGH RISK MEDICATION USE: ICD-10-CM

## 2022-08-29 DIAGNOSIS — M05.741 RHEUMATOID ARTHRITIS INVOLVING BOTH HANDS WITH POSITIVE RHEUMATOID FACTOR (H): ICD-10-CM

## 2022-08-29 DIAGNOSIS — M05.742 RHEUMATOID ARTHRITIS INVOLVING BOTH HANDS WITH POSITIVE RHEUMATOID FACTOR (H): ICD-10-CM

## 2022-08-29 PROCEDURE — G0463 HOSPITAL OUTPT CLINIC VISIT: HCPCS

## 2022-08-29 PROCEDURE — 99215 OFFICE O/P EST HI 40 MIN: CPT | Performed by: STUDENT IN AN ORGANIZED HEALTH CARE EDUCATION/TRAINING PROGRAM

## 2022-08-29 ASSESSMENT — PAIN SCALES - GENERAL: PAINLEVEL: NO PAIN (0)

## 2022-08-29 NOTE — NURSING NOTE
"Chief Complaint   Patient presents with     RECHECK     F/u     /62   Pulse 102   Temp 98.3  F (36.8  C) (Oral)   Ht 1.702 m (5' 7\")   SpO2 94%   BMI 25.97 kg/m      Lisa Patton MA  "

## 2022-08-29 NOTE — PROGRESS NOTES
"[facility] Rheumatology  Last seen: 2/28/22   DOS: 08/29/22     CC: seropositive RA    Interval history: Rosa is a 73 yo with seropositive RA affecting particuarly her left wrist, left 2nd and 3rd MCP, first three digits of right hand. Rosa has continued 20 mg methotrexate (split day dosing) since our last visit. She is tolerating it very well. Every now and then she forget the evening portion of her dose and takes it the next morning without any side effects. We discussed that I would prefer that if she forget the evening dose to just skip it and take the normal dose the next week. She does still get right hand pains sometimes with up to 30 minutes of right hand stiffness in the morning and more aching when she is very active with her right wrist but she feels it is very tolerable to the point that she does not take meloxicam for it.    ROS: 10 point ROS neg other than the symptoms noted above in the HPI.   ALLERGIES: Patient has no known allergies.   MEDS: personally reviewed  Methotrexate. 6 tablets weekly started December 20221. Increased to 8 tablets (split day dosing) February 2022 to present.  Meloxicam 15 mg occasional though not used in recent months    PRIOR RHEUM MEDS: diclofenac BID    PMHx:  No past medical history on file.     PSHx:  Past Surgical History:   Procedure Laterality Date     KNEE SURGERY Left 2018    total knee     STRIP VEIN  1992       SocHx:  Social History     Tobacco Use     Smoking status: Never Smoker     Smokeless tobacco: Never Used   Substance Use Topics     Alcohol use: Yes     Comment: 3 per week     Drug use: No        FamHx:  family history includes Deep Vein Thrombosis in her mother; Heart Disease (age of onset: 95) in her mother; Hypertension in her father; Vascular Disease in her sister.     PHYSICAL EXAM  Vitals: /62   Pulse 102   Temp 98.3  F (36.8  C) (Oral)   Ht 1.702 m (5' 7\")   SpO2 94%   BMI 25.97 kg/m    BMI= Body mass index is 25.97 kg/m .   General: " NAD  HEENT: face symmetric, no oral erythema or sores, healthy dentition  Skin: no rash on face, neck, hands, no flaking dry skin at first digit, no periungal erythema or capillary dropout  Resp: breathing comfortably on room air  CV: warm, well perfused extremities with even bilateral arm pulses, regular  Neuro: easily moving independently, arms and legs  MSK: heberden's nodes of several DIPs, no swelling noted today at PIPs, decreased synovitis and no pain at left 2nd and 3rd MCP, no swelling or synovitis of wrists. Able to make tight fists today. Full ROM of elbows and shoulders without pain. No rheumatoid nodules noted.        LAB REVIEW:  RHEUM RESULTS Latest Ref Rng & Units 3/5/2013 1/12/2017 4/9/2018   ALBUMIN 3.4 - 5.0 g/dL 3.6 - -   ALT 0 - 50 U/L 20.0 - -   AST 0 - 45 U/L 23.0 - -   COMPLEMENT C3 81 - 157 mg/dL - - -   COMPLEMENT C4 13 - 39 mg/dL - - -   CREATININE 0.52 - 1.04 mg/dL 0.6 - -   CRP 0.0 - 8.0 mg/L - - -   DNA <10.0 IU/mL - - -   GFR ESTIMATE >60 mL/min/1.73m2 - - -   HEMATOCRIT 35.0 - 47.0 % 39.8 - 40.7   HEMOGLOBIN 11.7 - 15.7 g/dL 13.3 - 13.6   HCVAB NR - Nonreactive   Assay performance characteristics have not been established for newborns,   infants, and children   -   WBC 4.0 - 11.0 10e3/uL 5.5 - 6.6   RBC 3.80 - 5.20 10e6/uL 4.21 - 4.25   RDW 10.0 - 15.0 % 13.0 - 12.6   MCHC 31.5 - 36.5 g/dL 33.4 - 33.4   MCV 78 - 100 fL 95 - 96   PLATELET COUNT 150 - 450 10e3/uL 284 - 284   RHEUMATOID FACTOR <12 IU/mL - - -   ESR 0 - 30 mm/hr - - -   QUANTIFERON-TB GOLD PLUS RESULT Negative - - -        Lab Results   Component Value Date/Time     (H) 11/22/2021 02:37 PM    CCPIGG >340.0 (H) 11/22/2021 02:37 PM    DNA 0.8 11/22/2021 02:37 PM    SMIGG Negative 11/22/2021 02:37 PM    SSAIGG Negative 11/22/2021 02:37 PM    SSBIGG Negative 11/22/2021 02:37 PM        Lab Results   Component Value Date/Time    HBCAB Nonreactive 11/30/2021 10:11 AM    TBRES Negative 11/30/2021 10:11 AM         IMAGIN21 bilateral hand xray personally reviewed and found to have notable periarticular osteopenia, possibly a very small erosion in ulnar side of middle finger MCP      ASSESSMENT: 71 yo F with seropositive (CCP++RF+) rheumatoid arthritis with periarticular osteopenia of the hands, synovitis of left wrist, left 2nd and 3rd MCPs, and 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 Doing well with low disease activity on 20 mg weekly methotrexate.    DISCUSSION: Methotrexate was increased to 20 mg weekly at the beginning on February and to date she has been able to discontinue both prednisone and meloxicam without return of wrist/hand pain and swelling. If symptoms return, or if she starts to need meloxicam more than a few times per week, we will escalate therapies. Labs reviewed today.    DIAGNOSIS:  ## dual seropositive RA, CCP++, RF+  ## synovitis of left 2nd and 3rd MCPs, left wrist, and likely right wrist  ## periarticular osteopenia, particularly of left hand    PLAN:  1. Continue methotrexate 20 mg once per week after below labs result  2. Continue folic acid daily  3. Continue meloxicam 15 mg once a day as needed or in anticipation of lots of wrist activity (I.e. baking, kneading)  4. Labs every 3-4 months    RTC in 6 months, video visit, sooner if needed    Cayetano An MD, PhD  [facility] Rheumatology     60 minutes spent on the date of encounter doing chart review, history and exam, lab review, documentation and further activities as noted above.

## 2022-08-29 NOTE — LETTER
8/29/2022       RE: Rosa Larson  [address]  [facility] [address]     Dear Colleague,    Thank you for referring your patient, Rosa Larson, to the [facility] RHEUMATOLOGY at [facility]. Please see a copy of my visit note below.    [facility] Rheumat[facility]  Last seen: 2/28/22   DOS: 08/29/22     CC: seropositive RA    Interval history: Rosa is a 71 yo with seropositive RA affecting particuarly her left wrist, left 2nd and 3rd MCP, first three digits of right hand. Rosa has continued 20 mg methotrexate (split day dosing) since our last visit. She is tolerating it very well. Every now and then she forget the evening portion of her dose and takes it the next morning without any side effects. We discussed that I would prefer that if she forget the evening dose to just skip it and take the normal dose the next week. She does still get right hand pains sometimes with up to 30 minutes of right hand stiffness in the morning and more aching when she is very active with her right wrist but she feels it is very tolerable to the point that she does not take meloxicam for it.    ROS: 10 point ROS neg other than the symptoms noted above in the HPI.   ALLERGIES: Patient has no known allergies.   MEDS: personally reviewed  Methotrexate. 6 tablets weekly started December 20221. Increased to 8 tablets (split day dosing) February 2022 to present.  Meloxicam 15 mg occasional though not used in recent months    PRIOR RHEUM MEDS: diclofenac BID    PMHx:  No past medical history on file.     PSHx:  Past Surgical History:   Procedure Laterality Date     KNEE SURGERY Left 2018    total knee     STRIP VEIN  1992       SocHx:  Social History     Tobacco Use     Smoking status: Never Smoker     Smokeless tobacco: Never Used   Substance Use Topics     Alcohol use: Yes     Comment: 3 per week     Drug use: No        FamHx:  family history includes Deep Vein Thrombosis in  "her mother; Heart Disease (age of onset: 95) in her mother; Hypertension in her father; Vascular Disease in her sister.     PHYSICAL EXAM  Vitals: /62   Pulse 102   Temp 98.3  F (36.8  C) (Oral)   Ht 1.702 m (5' 7\")   SpO2 94%   BMI 25.97 kg/m    BMI= Body mass index is 25.97 kg/m .   General: NAD  HEENT: face symmetric, no oral erythema or sores, healthy dentition  Skin: no rash on face, neck, hands, no flaking dry skin at first digit, no periungal erythema or capillary dropout  Resp: breathing comfortably on room air  CV: warm, well perfused extremities with even bilateral arm pulses, regular  Neuro: easily moving independently, arms and legs  MSK: heberden's nodes of several DIPs, no swelling noted today at PIPs, decreased synovitis and no pain at left 2nd and 3rd MCP, no swelling or synovitis of wrists. Able to make tight fists today. Full ROM of elbows and shoulders without pain. No rheumatoid nodules noted.        LAB REVIEW:  RHEUM RESULTS Latest Ref Rng & Units 3/5/2013 1/12/2017 4/9/2018   ALBUMIN 3.4 - 5.0 g/dL 3.6 - -   ALT 0 - 50 U/L 20.0 - -   AST 0 - 45 U/L 23.0 - -   COMPLEMENT C3 81 - 157 mg/dL - - -   COMPLEMENT C4 13 - 39 mg/dL - - -   CREATININE 0.52 - 1.04 mg/dL 0.6 - -   CRP 0.0 - 8.0 mg/L - - -   DNA <10.0 IU/mL - - -   GFR ESTIMATE >60 mL/min/1.73m2 - - -   HEMATOCRIT 35.0 - 47.0 % 39.8 - 40.7   HEMOGLOBIN 11.7 - 15.7 g/dL 13.3 - 13.6   HCVAB NR - Nonreactive   Assay performance characteristics have not been established for newborns,   infants, and children   -   WBC 4.0 - 11.0 10e3/uL 5.5 - 6.6   RBC 3.80 - 5.20 10e6/uL 4.21 - 4.25   RDW 10.0 - 15.0 % 13.0 - 12.6   MCHC 31.5 - 36.5 g/dL 33.4 - 33.4   MCV 78 - 100 fL 95 - 96   PLATELET COUNT 150 - 450 10e3/uL 284 - 284   RHEUMATOID FACTOR <12 IU/mL - - -   ESR 0 - 30 mm/hr - - -   QUANTIFERON-TB GOLD PLUS RESULT Negative - - -        Lab Results   Component Value Date/Time     (H) 11/22/2021 02:37 PM    CCPIGG >340.0 (H) " 2021 02:37 PM    DNA 0.8 2021 02:37 PM    SMIGG Negative 2021 02:37 PM    SSAIGG Negative 2021 02:37 PM    SSBIGG Negative 2021 02:37 PM        Lab Results   Component Value Date/Time    HBCAB Nonreactive 2021 10:11 AM    TBRES Negative 2021 10:11 AM        IMAGIN21 bilateral hand xray personally reviewed and found to have notable periarticular osteopenia, possibly a very small erosion in ulnar side of middle finger MCP      ASSESSMENT: 71 yo F with seropositive (CCP++RF+) rheumatoid arthritis with periarticular osteopenia of the hands, synovitis of left wrist, left 2nd and 3rd MCPs, and 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 Doing well with low disease activity on 20 mg weekly methotrexate.    DISCUSSION: Methotrexate was increased to 20 mg weekly at the beginning on February and to date she has been able to discontinue both prednisone and meloxicam without return of wrist/hand pain and swelling. If symptoms return, or if she starts to need meloxicam more than a few times per week, we will escalate therapies. Labs reviewed today.    DIAGNOSIS:  ## dual seropositive RA, CCP++, RF+  ## synovitis of left 2nd and 3rd MCPs, left wrist, and likely right wrist  ## periarticular osteopenia, particularly of left hand    PLAN:  1. Continue methotrexate 20 mg once per week after below labs result  2. Continue folic acid daily  3. Continue meloxicam 15 mg once a day as needed or in anticipation of lots of wrist activity (I.e. baking, kneading)  4. Labs every 3-4 months    RTC in 6 months, video visit, sooner if needed    Cayetano An MD, PhD  [facility] Rheumatology     60 minutes spent on the date of encounter doing chart review, history and exam, lab review, documentation and further activities as noted above.

## 2022-08-29 NOTE — PATIENT INSTRUCTIONS
Continue methotrexate 8 tablets, 4 in the morning and 4 in the afternoon/evening.  Folic acid every day  Meloxicam as needed    Next visit by video in 6 months

## 2022-11-28 DIAGNOSIS — M05.742 RHEUMATOID ARTHRITIS INVOLVING BOTH HANDS WITH POSITIVE RHEUMATOID FACTOR (H): ICD-10-CM

## 2022-11-28 DIAGNOSIS — M05.741 RHEUMATOID ARTHRITIS INVOLVING BOTH HANDS WITH POSITIVE RHEUMATOID FACTOR (H): ICD-10-CM

## 2022-11-29 RX ORDER — FOLIC ACID 1 MG/1
1 TABLET ORAL DAILY
Qty: 90 TABLET | Refills: 3 | Status: SHIPPED | OUTPATIENT
Start: 2022-11-29 | End: 2023-02-06

## 2022-12-27 ENCOUNTER — OFFICE VISIT (OUTPATIENT)
Dept: FAMILY MEDICINE | Facility: CLINIC | Age: 72
End: 2022-12-27
Payer: MEDICARE

## 2022-12-27 VITALS
BODY MASS INDEX: 25.84 KG/M2 | OXYGEN SATURATION: 97 % | DIASTOLIC BLOOD PRESSURE: 82 MMHG | WEIGHT: 165 LBS | RESPIRATION RATE: 15 BRPM | HEART RATE: 97 BPM | SYSTOLIC BLOOD PRESSURE: 143 MMHG | TEMPERATURE: 98.1 F

## 2022-12-27 DIAGNOSIS — Z01.818 PREOP GENERAL PHYSICAL EXAM: Primary | ICD-10-CM

## 2022-12-27 DIAGNOSIS — M05.741 RHEUMATOID ARTHRITIS INVOLVING BOTH HANDS WITH POSITIVE RHEUMATOID FACTOR (H): ICD-10-CM

## 2022-12-27 DIAGNOSIS — M05.742 RHEUMATOID ARTHRITIS INVOLVING BOTH HANDS WITH POSITIVE RHEUMATOID FACTOR (H): ICD-10-CM

## 2022-12-27 DIAGNOSIS — Z23 NEED FOR TD VACCINE: ICD-10-CM

## 2022-12-27 DIAGNOSIS — Z79.899 HIGH RISK MEDICATION USE: ICD-10-CM

## 2022-12-27 LAB
ALBUMIN SERPL BCG-MCNC: 4.1 G/DL (ref 3.5–5.2)
ALT SERPL W P-5'-P-CCNC: 14 U/L (ref 10–35)
AST SERPL W P-5'-P-CCNC: 18 U/L (ref 10–35)
BASO+EOS+MONOS # BLD AUTO: 0.6 10E3/UL (ref 0–2.2)
BASO+EOS+MONOS NFR BLD AUTO: 8 %
CREAT SERPL-MCNC: 0.51 MG/DL (ref 0.51–0.95)
ERYTHROCYTE [DISTWIDTH] IN BLOOD BY AUTOMATED COUNT: 13.6 % (ref 10–15)
GFR SERPL CREATININE-BSD FRML MDRD: >90 ML/MIN/1.73M2
HCT VFR BLD AUTO: 37.1 % (ref 35–47)
HGB BLD-MCNC: 12.3 G/DL (ref 11.7–15.7)
LYMPHOCYTES # BLD AUTO: 1.6 10E3/UL (ref 0.8–5.3)
LYMPHOCYTES NFR BLD AUTO: 22 %
MCH RBC QN AUTO: 31.9 PG (ref 26.5–33)
MCHC RBC AUTO-ENTMCNC: 33.2 G/DL (ref 31.5–36.5)
MCV RBC AUTO: 96 FL (ref 78–100)
NEUTROPHILS # BLD AUTO: 5 10E3/UL (ref 1.6–8.3)
NEUTROPHILS NFR BLD AUTO: 70 %
PLATELET # BLD AUTO: 354 10E3/UL (ref 150–450)
RBC # BLD AUTO: 3.86 10E6/UL (ref 3.8–5.2)
WBC # BLD AUTO: 7.2 10E3/UL (ref 4–11)

## 2022-12-27 PROCEDURE — 84460 ALANINE AMINO (ALT) (SGPT): CPT | Performed by: STUDENT IN AN ORGANIZED HEALTH CARE EDUCATION/TRAINING PROGRAM

## 2022-12-27 PROCEDURE — 84450 TRANSFERASE (AST) (SGOT): CPT | Performed by: STUDENT IN AN ORGANIZED HEALTH CARE EDUCATION/TRAINING PROGRAM

## 2022-12-27 PROCEDURE — 82565 ASSAY OF CREATININE: CPT | Performed by: STUDENT IN AN ORGANIZED HEALTH CARE EDUCATION/TRAINING PROGRAM

## 2022-12-27 PROCEDURE — 82040 ASSAY OF SERUM ALBUMIN: CPT | Performed by: STUDENT IN AN ORGANIZED HEALTH CARE EDUCATION/TRAINING PROGRAM

## 2022-12-27 NOTE — NURSING NOTE
Prior to immunization administration, verified patients identity using patient s name and date of birth. Please see Immunization Activity for additional information.     Screening Questionnaire for Adult Immunization    Are you sick today?   No   Do you have allergies to medications, food, a vaccine component or latex?   No   Have you ever had a serious reaction after receiving a vaccination?   No   Do you have a long-term health problem with heart, lung, kidney, or metabolic disease (e.g., diabetes), asthma, a blood disorder, no spleen, complement component deficiency, a cochlear implant, or a spinal fluid leak?  Are you on long-term aspirin therapy?   No   Do you have cancer, leukemia, HIV/AIDS, or any other immune system problem?   No   Do you have a parent, brother, or sister with an immune system problem?   No   In the past 3 months, have you taken medications that affect  your immune system, such as prednisone, other steroids, or anticancer drugs; drugs for the treatment of rheumatoid arthritis, Crohn s disease, or psoriasis; or have you had radiation treatments?   No   Have you had a seizure, or a brain or other nervous system problem?   No   During the past year, have you received a transfusion of blood or blood    products, or been given immune (gamma) globulin or antiviral drug?   No   For women: Are you pregnant or is there a chance you could become       pregnant during the next month?   No   Have you received any vaccinations in the past 4 weeks?   No     Immunization questionnaire answers were all negative.        Per orders of Dr. Harvey, injection of TD given by Deb Gibbs MA. Patient instructed to remain in clinic for 15 minutes afterwards, and to report any adverse reaction to me immediately.       Screening performed by Deb Gibbs MA on 12/27/2022 at 10:27 AM.

## 2022-12-27 NOTE — PROGRESS NOTES
[facility]  [address]  [facility] [address]  Phone: [phone]  Fax: [phone]  Primary Provider: Thi Harvey  Pre-op Performing Provider: THI HARVEY    PREOPERATIVE EVALUATION:  Today's date: 12/27/2022    Rosa Larson is a 72 year old female who presents for a preoperative evaluation.    Surgical Information:  Surgery/Procedure: Third possible Fourth Metatarsal Osteotomy, RIGHT  Surgery Location: [facility]  Surgeon: Henry Boyd MD  Surgery Date: 01/06/2023  Time of Surgery: 6:45 AM  Where patient plans to recover: At home with family  Fax number for surgical facility: [phone]    Type of Anesthesia Anticipated: General    Assessment & Plan     The proposed surgical procedure is considered INTERMEDIATE risk.    (Z01.818) Preop general physical exam  (primary encounter diagnosis)  Comment: Elective third, possible fourth metatarsal osteotomy, revision after previous surgery.   Plan: No contraindications. Proceed as planned. Only take methotrexate and folic acid. Pt is instructed to hold any vitamins, herbs, supplements and NSAIDs 10 days prior to surgery. She may take any prescription medications with a small sip of water on the morning of the procedure.     (Z23) Need for Td vaccine  Comment: Routine booster needed.  Plan: Given today.     (M05.741,  M05.742) Rheumatoid arthritis involving both hands with positive rheumatoid factor (H)  Comment: Arthritis is well controlled with use of methotrexate weekly.   Plan: No need to stop this medication prior to surgery. Of note, penicillins may increase methotrexate concentration but cephalosporins do not cause this effect. Rheumatologist has routine standing labs today.        RECOMMENDATION:  APPROVAL GIVEN to proceed with proposed procedure, without further diagnostic evaluation.    Thi Harvey MD  Internal Medicine/Pediatrics      Subjective     HPI related to upcoming  "procedure:   Rosa is status post capsular repair 2nd MTPJ and 2nd metatarsal distal shortening osteotomy, PIPJ arthrodesis 2nd toe, RIGHT foot on 10/07/2021.   She noted improvement after surgery but over the course of months, she reported pain \"like a bone coming through the bottom of my foot.\" She is currently unable to walk barefoot. She states that she generally walks 3 miles a day and notes that her foot would feel very tender at the end of a day. She was given the options of using an orthotic or surgical intervention. She felt that orthotics would not be truly fixing the issue. She plans to undergo elective shortening of her third and possible fourth metatarsal, RIGHT foot.       Preop Questions 12/26/2022   1. Have you ever had a heart attack or stroke? No   2. Have you ever had surgery on your heart or blood vessels, such as a stent placement, a coronary artery bypass, or surgery on an artery in your head, neck, heart, or legs? No   3. Do you have chest pain with activity? No   4. Do you have a history of  heart failure? No   5. Do you currently have a cold, bronchitis or symptoms of other infection? No   6. Do you have a cough, shortness of breath, or wheezing? No   7. Do you or anyone in your family have previous history of blood clots? YES - mother. No personal history of blood clots.   8. Do you or does anyone in your family have a serious bleeding problem such as prolonged bleeding following surgeries or cuts? No   9. Have you ever had problems with anemia or been told to take iron pills? No   10. Have you had any abnormal blood loss such as black, tarry or bloody stools, or abnormal vaginal bleeding? No   11. Have you ever had a blood transfusion? No   12. Are you willing to have a blood transfusion if it is medically needed before, during, or after your surgery? Yes   13. Have you or any of your relatives ever had problems with anesthesia? No   14. Do you have sleep apnea, excessive snoring or " "daytime drowsiness? No   15. Do you have any artifical heart valves or other implanted medical devices like a pacemaker, defibrillator, or continuous glucose monitor? No   16. Do you have artificial joints? YES - left knee   17. Are you allergic to latex? No      Need for Vaccination  Rosa is up to date on her Covid boosters, flu vaccines, pneumococcal vaccines, and Shingrix vaccine series. She will be due for a TDAP booster in January, 2023 and chooses to get this today.     Rheumatoid Arthritis  Rosa follows with a rheumatologist for RA and is currently taking methotrexate 20 mg weekly in a split dose, 10 mg in the morning and 10 mg in the evening. She reports that her arthritis has been \"fine.\" She does note that one of her middle fingers is swollen, but it does not hurt. She has history of left forearm pain and hand stiffness. This is improved. She is due for labs which we will complete today.     Health Care Directive:  Patient does not have a Health Care Directive or Living Will: Discussed advance care planning with patient; information given to patient to review.     Preoperative Review of :   reviewed - no record of controlled substances prescribed.    Status of Chronic Conditions:  See problem list for active medical problems.  Problems all longstanding and stable, except as noted/documented.  See ROS for pertinent symptoms related to these conditions.      Review of Systems  Constitutional, neuro, ENT, endocrine, pulmonary, cardiac, gastrointestinal, genitourinary, musculoskeletal, integument and psychiatric systems are negative, except as otherwise noted.    Patient Active Problem List    Diagnosis Date Noted     Primary osteoarthritis of left knee 11/19/2021     Priority: Medium     Multiple joint pain 11/19/2021     Priority: Medium     Varicose veins 02/27/2013     Priority: Medium     Snoring 02/27/2013     Priority: Medium      No past medical history on file.  Past Surgical History: "   Procedure Laterality Date     KNEE SURGERY Left 2018    total knee     STRIP VEIN  1992     Current Outpatient Medications   Medication Sig Dispense Refill     calcium carbonate (OS-LUNA) 500 MG tablet Take 1 tablet by mouth 2 times daily       folic acid (FOLVITE) 1 MG tablet Take 1 tablet (1 mg) by mouth daily 90 tablet 3     methotrexate 2.5 MG tablet Take 8 tablets (20 mg) by mouth every 7 days for 180 days Labs every 8 - 12 weeks for refills. On your methotrexate day, one day per week, take 4 tablets in the morning and 4 tablets in the evening. Take folic acid every day. 96 tablet 1     Omega-3 Fatty Acids (FISH OIL) 1200 MG capsule Take 1 daily 180 capsule 3     vitamin C (ASCORBIC ACID) 500 MG tablet Take one daily 30 tablet 11     vitamin D3 (CHOLECALCIFEROL) 1000 units (25 mcg) tablet Take 1 tablet (1,000 Units) by mouth 30 tablet 11       No Known Allergies     Social History     Tobacco Use     Smoking status: Never     Smokeless tobacco: Never   Substance Use Topics     Alcohol use: Yes     Comment: 3 per week     Family History   Problem Relation Age of Onset     Heart Disease Mother 95     Deep Vein Thrombosis Mother         related to varicose vein     Hypertension Father      Vascular Disease Sister         Brain surgery, something to do with vein and blood vessel. headaches     History   Drug Use No         Objective     BP (!) 143/82 (BP Location: Left arm, Patient Position: Sitting, Cuff Size: Adult Regular)   Pulse 97   Temp 98.1  F (36.7  C) (Skin)   Resp 15   Wt 74.8 kg (165 lb)   SpO2 97%   BMI 25.84 kg/m      Physical Exam    GENERAL APPEARANCE: healthy, alert and no distress     EYES: EOMI, PERRL     HENT: ear canals and TM's normal and nose and mouth without ulcers or lesions, Malampati 1     NECK: no adenopathy     RESP: lungs clear to auscultation      CV: regular rates and rhythm, normal S1 S2, no murmur     ABDOMEN:  soft, nontender, no HSM or masses and bowel sounds normal      MS: extremities normal- no gross deformities noted, no evidence of inflammation in joints, FROM in all extremities.     SKIN: no suspicious lesions or rashes     NEURO: Normal strength and tone, sensory exam grossly normal, mentation intact and speech normal     PSYCH: mentation appears normal. and affect normal/bright     EXT: No edema. Pulses full     RIGHT FOOT: Callus under right third metatarsal head, nontender to palpation.     Recent Labs   Lab Test 08/23/22  0825 02/16/22  0748   HGB 13.0 12.9    285   CR 0.51* 0.51*      Results for orders placed or performed in visit on 12/27/22   ALT     Status: Normal   Result Value Ref Range    ALT 14 10 - 35 U/L   AST     Status: Normal   Result Value Ref Range    AST 18 10 - 35 U/L   Albumin level     Status: Normal   Result Value Ref Range    Albumin 4.1 3.5 - 5.2 g/dL   Creatinine     Status: Normal   Result Value Ref Range    Creatinine 0.51 0.51 - 0.95 mg/dL    GFR Estimate >90 >60 mL/min/1.73m2   CBC with platelets and differential     Status: None   Result Value Ref Range    WBC Count 7.2 4.0 - 11.0 10e3/uL    RBC Count 3.86 3.80 - 5.20 10e6/uL    Hemoglobin 12.3 11.7 - 15.7 g/dL    Hematocrit 37.1 35.0 - 47.0 %    MCV 96 78 - 100 fL    MCH 31.9 26.5 - 33.0 pg    MCHC 33.2 31.5 - 36.5 g/dL    RDW 13.6 10.0 - 15.0 %    Platelet Count 354 150 - 450 10e3/uL    % Neutrophils 70 %    % Lymphocytes 22 %    Mids % (Monos, Eos, Basos) 8 %    Absolute Neutrophils 5.0 1.6 - 8.3 10e3/uL    Absolute Lymphocytes 1.6 0.8 - 5.3 10e3/uL    Mids Abs (Monos, Eos, Basos) 0.6 0.0 - 2.2 10e3/uL   CBC with platelets differential     Status: None    Narrative    The following orders were created for panel order CBC with platelets differential.  Procedure                               Abnormality         Status                     ---------                               -----------         ------                     CBC with platelets and d...[[id number]]                       Final result                 Please view results for these tests on the individual orders.         Diagnostics:  Labs reviewed and are normal.   No EKG required, no history of coronary heart disease, significant arrhythmia, peripheral arterial disease or other structural heart disease.    Revised Cardiac Risk Index (RCRI):  The patient has the following serious cardiovascular risks for perioperative complications:   - No serious cardiac risks = 0 points     RCRI Interpretation: 0 points: Class I (very low risk - 0.4% complication rate)    Signed Electronically by: Thi Harvey MD  Copy of this evaluation report is provided to requesting physician.    IBrigid, am serving as a scribe to document services personally performed by Dr. Thi Harvey, based on data collection and the provider's statements to me. Dr. Harvey has reviewed, edited, and approved the above note.

## 2022-12-27 NOTE — NURSING NOTE
72 year old  Chief Complaint   Patient presents with     Pre-Op Exam     Third possible Fourth Metatarsal Osteotomy at [facility] in [place], Dr Boyd       Blood pressure (!) 143/82, pulse 97, temperature 98.1  F (36.7  C), temperature source Skin, resp. rate 15, weight 74.8 kg (165 lb), SpO2 97 %, not currently breastfeeding. Body mass index is 25.84 kg/m .  Patient Active Problem List   Diagnosis     Varicose veins     Snoring     Primary osteoarthritis of left knee     Multiple joint pain       Wt Readings from Last 2 Encounters:   12/27/22 74.8 kg (165 lb)   11/29/21 75.2 kg (165 lb 12.8 oz)     BP Readings from Last 3 Encounters:   12/27/22 (!) 143/82   08/29/22 122/62   02/28/22 132/68         Current Outpatient Medications   Medication     calcium carbonate (OS-LUNA) 500 MG tablet     folic acid (FOLVITE) 1 MG tablet     methotrexate 2.5 MG tablet     Omega-3 Fatty Acids (FISH OIL) 1200 MG capsule     vitamin C (ASCORBIC ACID) 500 MG tablet     vitamin D3 (CHOLECALCIFEROL) 1000 units (25 mcg) tablet     No current facility-administered medications for this visit.       Social History     Tobacco Use     Smoking status: Never     Smokeless tobacco: Never   Substance Use Topics     Alcohol use: Yes     Comment: 3 per week     Drug use: No       Health Maintenance Due   Topic Date Due     ADVANCE CARE PLANNING  Never done     MAMMO SCREENING  03/14/2019     MEDICARE ANNUAL WELLNESS VISIT  11/28/2019     COLORECTAL CANCER SCREENING  07/19/2022     FALL RISK ASSESSMENT  07/20/2022     COVID-19 Vaccine (6 - Booster for Moderna series) 11/14/2022       Lab Results   Component Value Date    PAP NIL 11/28/2018 December 27, 2022 9:08 AM

## 2023-02-06 ENCOUNTER — TELEPHONE (OUTPATIENT)
Dept: RHEUMATOLOGY | Facility: CLINIC | Age: 73
End: 2023-02-06

## 2023-02-06 ENCOUNTER — VIRTUAL VISIT (OUTPATIENT)
Dept: RHEUMATOLOGY | Facility: CLINIC | Age: 73
End: 2023-02-06
Attending: STUDENT IN AN ORGANIZED HEALTH CARE EDUCATION/TRAINING PROGRAM
Payer: MEDICARE

## 2023-02-06 DIAGNOSIS — M05.741 RHEUMATOID ARTHRITIS INVOLVING BOTH HANDS WITH POSITIVE RHEUMATOID FACTOR (H): ICD-10-CM

## 2023-02-06 DIAGNOSIS — Z79.899 HIGH RISK MEDICATION USE: ICD-10-CM

## 2023-02-06 DIAGNOSIS — M05.742 RHEUMATOID ARTHRITIS INVOLVING BOTH HANDS WITH POSITIVE RHEUMATOID FACTOR (H): ICD-10-CM

## 2023-02-06 PROCEDURE — 99214 OFFICE O/P EST MOD 30 MIN: CPT | Mod: 95 | Performed by: STUDENT IN AN ORGANIZED HEALTH CARE EDUCATION/TRAINING PROGRAM

## 2023-02-06 RX ORDER — FOLIC ACID 1 MG/1
1 TABLET ORAL DAILY
Qty: 90 TABLET | Refills: 3 | Status: SHIPPED | OUTPATIENT
Start: 2023-02-06 | End: 2024-01-17

## 2023-02-06 RX ORDER — PREDNISONE 5 MG/1
TABLET ORAL
Qty: 35 TABLET | Refills: 0 | Status: SHIPPED | OUTPATIENT
Start: 2023-02-06 | End: 2023-03-06

## 2023-02-06 NOTE — PROGRESS NOTES
[facility]  Last seen: 02/28/22   DOS: 02/06/23     CC: seropositive RA    Interval history: Since her initial visit, Rosa has continued to take 8 tablets of methotrexate weekly, folic acid daily, and meloxicam as needed. She had surgery with Dr. Boyd on 1/6/2023 who kindly called and confirmed that I would not want to hold the methotrexate for the surgery. 1 week from Friday will get rid of the boot. Has had one day here and there of alternating shoulder pain. Since jan 5th has had more hand pain, has stiffness in her hands for about an hour. Has not been taking meloxicam. We discussed that weather can alter the experience of RA associated hand pain but does not always lead to more permanent damage. Rosa would prefer to wait and see how she does in the spring and leave medications alone at this time. She is going to [place] and would appreciate an as needed taper of prednisone in case she flares. She will let me know how her morning stiffmess does in [place] next week. She is also aware that she can take meloxicam if needed. We discussed that if in the spring/summer she is still having a lot of morning stiffness is persistent that we could consider adding plaquenil to her regimen but she does not want to do so at this time.    ROS: 10 point ROS neg other than the symptoms noted above in the HPI.   ALLERGIES: Patient has no known allergies.   MEDS: personally reviewed, notable for methotrexate, folic acid, meloxicam    PRIOR RHEUM MEDS: diclofenac BID    PMHx:  No past medical history on file.     PSHx:  Past Surgical History:   Procedure Laterality Date     KNEE SURGERY Left 2018    total knee     STRIP VEIN  1992       SocHx:  Social History     Tobacco Use     Smoking status: Never     Smokeless tobacco: Never   Substance Use Topics     Alcohol use: Yes     Comment: 3 per week     Drug use: No        FamHx:  family history includes Deep Vein Thrombosis in her mother; Heart Disease (age of onset: 95)  in her mother; Hypertension in her father; Vascular Disease in her sister.     PHYSICAL EXAM  Vitals: There were no vitals taken for this visit.  BMI= There is no height or weight on file to calculate BMI.   General: NAD  HEENT: face symmetric  Skin: no rash on face, neck  Resp: breathing comfortably on room air  CV: warm, well perfused apearing extremities  Neuro: easily moving independently    LAB REVIEW:  RHEUM RESULTS Latest Ref Rng & Units 3/5/2013 2017 2018   ALBUMIN 3.4 - 5.0 g/dL 3.6 - -   ALT 10 - 35 U/L 20.0 - -   AST 10 - 35 U/L 23.0 - -   COMPLEMENT C3 81 - 157 mg/dL - - -   COMPLEMENT C4 13 - 39 mg/dL - - -   CREATININE 0.51 - 0.95 mg/dL 0.6 - -   CRP 0.0 - 8.0 mg/L - - -   DNA <10.0 IU/mL - - -   GFR ESTIMATE >60 mL/min/1.73m2 - - -   HEMATOCRIT 35.0 - 47.0 % 39.8 - 40.7   HEMOGLOBIN 11.7 - 15.7 g/dL 13.3 - 13.6   HCVAB NR - Nonreactive   Assay performance characteristics have not been established for newborns,   infants, and children   -   WBC 4.0 - 11.0 10e3/uL 5.5 - 6.6   RBC 3.80 - 5.20 10e6/uL 4.21 - 4.25   RDW 10.0 - 15.0 % 13.0 - 12.6   MCHC 31.5 - 36.5 g/dL 33.4 - 33.4   MCV 78 - 100 fL 95 - 96   PLATELET COUNT 150 - 450 10e3/uL 284 - 284   RHEUMATOID FACTOR <12 IU/mL - - -   ESR 0 - 30 mm/hr - - -   QUANTIFERON-TB GOLD PLUS RESULT Negative - - -        Lab Results   Component Value Date/Time     (H) 2021 02:37 PM    CCPIGG >340.0 (H) 2021 02:37 PM    DNA 0.8 2021 02:37 PM    SMIGG Negative 2021 02:37 PM    SSAIGG Negative 2021 02:37 PM    SSBIGG Negative 2021 02:37 PM        Lab Results   Component Value Date/Time    HBCAB Nonreactive 2021 10:11 AM    TBRES Negative 2021 10:11 AM        IMAGIN21 bilateral hand xray personally reviewed and found to have notable periarticular osteopenia, possibly a very small erosion in ulnar side of middle finger MCP      ASSESSMENT: 71 yo F with seropositive (CCP++RF+) rheumatoid  arthritis with periarticular osteopenia of the hands, synovitis of left wrist, left 2nd and 3rd MCPs, and 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 Doing well today on 20 mg weekly methotrexate weekly but with ~1 hr morning stiffness in hands.     DISCUSSION: Methotrexate was increased to 20 mg weekly at the beginning on February 2022 with pretty good control of symptoms. Given her tolerable morning stiffness of ~1 hour, we are going to wait until spring and see how she does. She can still use meloxicam if needed. Would consider adding plaquenil but does not want to do so now. Finally, would consider hand OT in the future. Will prescribe prednisone taper PRN given [place] trip next week just in case.    DIAGNOSIS:  ## dual seropositive RA, CCP++, RF+  ## synovitis of left 2nd and 3rd MCPs, left wrist, and likely right wrist  ## periarticular osteopenia, particularly of left hand    PLAN:  1. Continue methotrexate 20 mg once per week  2. Continue folic acid daily  3. Continue meloxicam 15 mg once a day as needed   4. Labs in 3-4 months. Standing labs updates today  5. PRN Prednisone taper perscribed, only start if needed    RTC in 1 year    Cayetano An MD, PhD  [facility]     40 minutes spent on the date of encounter doing chart review, history and exam, lab review, documentation and further activities as noted above.

## 2023-02-06 NOTE — LETTER
2/6/2023       RE: Rosa Larson  [address]  [place] [facility] [place] [address]     Dear Colleague,    Thank you for referring your patient, Rosa Larson, to the [facility] RHEUMATOLOGY at [facility]. Please see a copy of my visit note below.    [facility]  Last seen: 02/28/22   DOS: 02/06/23     CC: seropositive RA    Interval history: Since her initial visit, Rosa has continued to take 8 tablets of methotrexate weekly, folic acid daily, and meloxicam as needed. She had surgery with Dr. Boyd on 1/6/2023 who kindly called and confirmed that I would not want to hold the methotrexate for the surgery. 1 week from Friday will get rid of the boot. Has had one day here and there of alternating shoulder pain. Since jan 5th has had more hand pain, has stiffness in her hands for about an hour. Has not been taking meloxicam. We discussed that weather can alter the experience of RA associated hand pain but does not always lead to more permanent damage. Rosa would prefer to wait and see how she does in the spring and leave medications alone at this time. She is going to [place] and would appreciate an as needed taper of prednisone in case she flares. She will let me know how her morning stiffmess does in [place] next week. She is also aware that she can take meloxicam if needed. We discussed that if in the spring/summer she is still having a lot of morning stiffness is persistent that we could consider adding plaquenil to her regimen but she does not want to do so at this time.    ROS: 10 point ROS neg other than the symptoms noted above in the HPI.   ALLERGIES: Patient has no known allergies.   MEDS: personally reviewed, notable for methotrexate, folic acid, meloxicam    PRIOR RHEUM MEDS: diclofenac BID    PMHx:  No past medical history on file.     PSHx:  Past Surgical History:   Procedure Laterality Date     KNEE SURGERY Left 2018    total knee      STRIP VEIN  1992       SocHx:  Social History     Tobacco Use     Smoking status: Never     Smokeless tobacco: Never   Substance Use Topics     Alcohol use: Yes     Comment: 3 per week     Drug use: No        FamHx:  family history includes Deep Vein Thrombosis in her mother; Heart Disease (age of onset: 95) in her mother; Hypertension in her father; Vascular Disease in her sister.     PHYSICAL EXAM  Vitals: There were no vitals taken for this visit.  BMI= There is no height or weight on file to calculate BMI.   General: NAD  HEENT: face symmetric  Skin: no rash on face, neck  Resp: breathing comfortably on room air  CV: warm, well perfused apearing extremities  Neuro: easily moving independently    LAB REVIEW:  RHEUM RESULTS Latest Ref Rng & Units 3/5/2013 1/12/2017 4/9/2018   ALBUMIN 3.4 - 5.0 g/dL 3.6 - -   ALT 10 - 35 U/L 20.0 - -   AST 10 - 35 U/L 23.0 - -   COMPLEMENT C3 81 - 157 mg/dL - - -   COMPLEMENT C4 13 - 39 mg/dL - - -   CREATININE 0.51 - 0.95 mg/dL 0.6 - -   CRP 0.0 - 8.0 mg/L - - -   DNA <10.0 IU/mL - - -   GFR ESTIMATE >60 mL/min/1.73m2 - - -   HEMATOCRIT 35.0 - 47.0 % 39.8 - 40.7   HEMOGLOBIN 11.7 - 15.7 g/dL 13.3 - 13.6   HCVAB NR - Nonreactive   Assay performance characteristics have not been established for newborns,   infants, and children   -   WBC 4.0 - 11.0 10e3/uL 5.5 - 6.6   RBC 3.80 - 5.20 10e6/uL 4.21 - 4.25   RDW 10.0 - 15.0 % 13.0 - 12.6   MCHC 31.5 - 36.5 g/dL 33.4 - 33.4   MCV 78 - 100 fL 95 - 96   PLATELET COUNT 150 - 450 10e3/uL 284 - 284   RHEUMATOID FACTOR <12 IU/mL - - -   ESR 0 - 30 mm/hr - - -   QUANTIFERON-TB GOLD PLUS RESULT Negative - - -        Lab Results   Component Value Date/Time     (H) 11/22/2021 02:37 PM    CCPIGG >340.0 (H) 11/22/2021 02:37 PM    DNA 0.8 11/22/2021 02:37 PM    SMIGG Negative 11/22/2021 02:37 PM    SSAIGG Negative 11/22/2021 02:37 PM    SSBIGG Negative 11/22/2021 02:37 PM        Lab Results   Component Value Date/Time    HBCAB Nonreactive  2021 10:11 AM    TBRES Negative 2021 10:11 AM        IMAGIN21 bilateral hand xray personally reviewed and found to have notable periarticular osteopenia, possibly a very small erosion in ulnar side of middle finger MCP      ASSESSMENT: 73 yo F with seropositive (CCP++RF+) rheumatoid arthritis with periarticular osteopenia of the hands, synovitis of left wrist, left 2nd and 3rd MCPs, and 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 Doing well today on 20 mg weekly methotrexate weekly but with ~1 hr morning stiffness in hands.     DISCUSSION: Methotrexate was increased to 20 mg weekly at the beginning on 2022 with pretty good control of symptoms. Given her tolerable morning stiffness of ~1 hour, we are going to wait until spring and see how she does. She can still use meloxicam if needed. Would consider adding plaquenil but does not want to do so now. Finally, would consider hand OT in the future. Will prescribe prednisone taper PRN given [place] trip next week just in case.    DIAGNOSIS:  ## dual seropositive RA, CCP++, RF+  ## synovitis of left 2nd and 3rd MCPs, left wrist, and likely right wrist  ## periarticular osteopenia, particularly of left hand    PLAN:  1. Continue methotrexate 20 mg once per week  2. Continue folic acid daily  3. Continue meloxicam 15 mg once a day as needed   4. Labs in 3-4 months. Standing labs updates today  5. PRN Prednisone taper perscribed, only start if needed    RTC in 1 year    Cayetano An MD, PhD  [facility]     40 minutes spent on the date of encounter doing chart review, history and exam, lab review, documentation and further activities as noted above.    Video-Visit Details    Type of service:  Video Visit    Video Start Time (time video started): 11:32    Video End Time (time video stopped): 11:55    Originating Location (pt. Location): Home        Distant Location  (provider location):  Off-site    Mode of Communication:  Video Conference via [employer]

## 2023-02-06 NOTE — NURSING NOTE
Pt declines review of med and other check in states that she just did e- check in and she already spent the time to do the check in.     Kimberly Stark on 2/6/2023 at 11:29 AM      Is the patient currently in the state of [place]? YES    Visit mode:VIDEO    If the visit is dropped, the patient can be reconnected by: VIDEO VISIT: Send to e-mail at: [email or web address]    Will anyone else be joining the visit? NO      How would you like to obtain your AVS? MyChart    Are changes needed to the allergy or medication list? NO    Comments or concerns related to today's visit: Pt feels E check in and VF check in is redundant and wishes to not go over the check in again since she has already taken time to do so.

## 2023-02-06 NOTE — PATIENT INSTRUCTIONS
Continue methotrexate and folic acid as you are. No medication changes today.  I prescribed a prednisone taper to have just in case while you are in [place].  Labs in 3-4 months

## 2023-02-06 NOTE — PROGRESS NOTES
Video-Visit Details    Type of service:  Video Visit    Video Start Time (time video started): 11:32    Video End Time (time video stopped): 11:55    Originating Location (pt. Location): Home        Distant Location (provider location):  Off-site    Mode of Communication:  Video Conference via [employer]

## 2023-03-08 ENCOUNTER — LAB REQUISITION (OUTPATIENT)
Dept: LAB | Facility: CLINIC | Age: 73
End: 2023-03-08

## 2023-03-08 ENCOUNTER — HOSPITAL ENCOUNTER (OUTPATIENT)
Dept: RESEARCH | Facility: CLINIC | Age: 73
Discharge: HOME | End: 2023-03-08
Attending: INTERNAL MEDICINE
Payer: MEDICARE

## 2023-03-08 DIAGNOSIS — Z00.6 RESEARCH STUDY PATIENT: Primary | ICD-10-CM

## 2023-03-08 LAB
CORTIS SERPL-MCNC: 5.8 UG/DL
CRP SERPL-MCNC: 4.52 MG/L
ERYTHROCYTE [DISTWIDTH] IN BLOOD BY AUTOMATED COUNT: 13.4 % (ref 10–15)
ERYTHROCYTE [SEDIMENTATION RATE] IN BLOOD BY WESTERGREN METHOD: 24 MM/HR (ref 0–30)
GLUCOSE SERPL-MCNC: 91 MG/DL (ref 70–99)
HCT VFR BLD AUTO: 39.8 % (ref 35–47)
HGB BLD-MCNC: 13.3 G/DL (ref 11.7–15.7)
MCH RBC QN AUTO: 32.3 PG (ref 26.5–33)
MCHC RBC AUTO-ENTMCNC: 33.4 G/DL (ref 31.5–36.5)
MCV RBC AUTO: 97 FL (ref 78–100)
PLATELET # BLD AUTO: 284 10E3/UL (ref 150–450)
RBC # BLD AUTO: 4.12 10E6/UL (ref 3.8–5.2)
WBC # BLD AUTO: 6.8 10E3/UL (ref 4–11)

## 2023-03-08 PROCEDURE — 300N000003 HC RESEARCH SPECIMEN PROCESSING, SIMPLE

## 2023-03-08 PROCEDURE — 82947 ASSAY GLUCOSE BLOOD QUANT: CPT | Performed by: INTERNAL MEDICINE

## 2023-03-08 PROCEDURE — 85014 HEMATOCRIT: CPT | Performed by: INTERNAL MEDICINE

## 2023-03-08 PROCEDURE — 86140 C-REACTIVE PROTEIN: CPT | Performed by: INTERNAL MEDICINE

## 2023-03-08 PROCEDURE — 510N000009 HC RESEARCH FACILITY, PER 15 MIN

## 2023-03-08 PROCEDURE — 510N000017 HC CRU PATIENT CARE, PER 15 MIN

## 2023-03-08 PROCEDURE — 85652 RBC SED RATE AUTOMATED: CPT | Performed by: INTERNAL MEDICINE

## 2023-03-08 PROCEDURE — 82533 TOTAL CORTISOL: CPT | Performed by: INTERNAL MEDICINE

## 2023-03-08 NOTE — ADDENDUM NOTE
Encounter addended by: Samir Mora on: 3/8/2023 5:10 PM   Actions taken: Charge Capture section accepted

## 2023-03-09 NOTE — ADDENDUM NOTE
Encounter addended by: Dianna Bess LPN on: 3/9/2023 12:33 PM   Actions taken: Charge Capture section accepted

## 2023-03-13 DIAGNOSIS — Z00.6 RESEARCH STUDY PATIENT: Primary | ICD-10-CM

## 2023-03-15 ENCOUNTER — HOSPITAL ENCOUNTER (OUTPATIENT)
Dept: RESEARCH | Facility: CLINIC | Age: 73
Discharge: HOME | End: 2023-03-15
Attending: INTERNAL MEDICINE | Admitting: INTERNAL MEDICINE
Payer: MEDICARE

## 2023-03-15 ENCOUNTER — LAB REQUISITION (OUTPATIENT)
Dept: LAB | Facility: CLINIC | Age: 73
End: 2023-03-15

## 2023-03-15 LAB
CORTIS SERPL-MCNC: 6.1 UG/DL
CRP SERPL-MCNC: 4.28 MG/L
ERYTHROCYTE [DISTWIDTH] IN BLOOD BY AUTOMATED COUNT: 13.4 % (ref 10–15)
ERYTHROCYTE [SEDIMENTATION RATE] IN BLOOD BY WESTERGREN METHOD: 21 MM/HR (ref 0–30)
FASTING STATUS PATIENT QL REPORTED: NORMAL
GLUCOSE SERPL-MCNC: 87 MG/DL (ref 70–99)
HCT VFR BLD AUTO: 39 % (ref 35–47)
HGB BLD-MCNC: 12.7 G/DL (ref 11.7–15.7)
MCH RBC QN AUTO: 32.2 PG (ref 26.5–33)
MCHC RBC AUTO-ENTMCNC: 32.6 G/DL (ref 31.5–36.5)
MCV RBC AUTO: 99 FL (ref 78–100)
PLATELET # BLD AUTO: 276 10E3/UL (ref 150–450)
RBC # BLD AUTO: 3.95 10E6/UL (ref 3.8–5.2)
WBC # BLD AUTO: 5.6 10E3/UL (ref 4–11)

## 2023-03-15 PROCEDURE — 82533 TOTAL CORTISOL: CPT | Performed by: INTERNAL MEDICINE

## 2023-03-15 PROCEDURE — 85652 RBC SED RATE AUTOMATED: CPT | Performed by: INTERNAL MEDICINE

## 2023-03-15 PROCEDURE — 510N000009 HC RESEARCH FACILITY, PER 15 MIN

## 2023-03-15 PROCEDURE — 86140 C-REACTIVE PROTEIN: CPT | Performed by: INTERNAL MEDICINE

## 2023-03-15 PROCEDURE — 300N000003 HC RESEARCH SPECIMEN PROCESSING, SIMPLE

## 2023-03-15 PROCEDURE — 510N000017 HC CRU PATIENT CARE, PER 15 MIN

## 2023-03-15 PROCEDURE — 82947 ASSAY GLUCOSE BLOOD QUANT: CPT | Performed by: INTERNAL MEDICINE

## 2023-03-15 PROCEDURE — 85027 COMPLETE CBC AUTOMATED: CPT | Performed by: INTERNAL MEDICINE

## 2023-03-18 ENCOUNTER — MYC MEDICAL ADVICE (OUTPATIENT)
Dept: RHEUMATOLOGY | Facility: CLINIC | Age: 73
End: 2023-03-18
Payer: MEDICARE

## 2023-03-20 NOTE — ADDENDUM NOTE
Encounter addended by: Radha Goldman RN on: 3/20/2023 10:50 AM   Actions taken: Charge Capture section accepted

## 2023-03-21 NOTE — TELEPHONE ENCOUNTER
MyC message response to patient updating that per Dr. Lou's standing orders vs the labs she is having done in the study, patient would need the following labs every 3 months:    CBC w/ platelets AND Differential  ALT  AST  Creatanine   Alkaline Phosphate    Route to Dr. Lou for review.    Rosy Rey RN  [facility]

## 2023-03-22 NOTE — TELEPHONE ENCOUNTER
Update patient with Dr. Lou's response, the study labs are fine at this time; if the CBC shows a problem we can get the differential at that time.    Rosy Rey RN  R[facility]umatology Clinic

## 2023-03-29 ENCOUNTER — LAB REQUISITION (OUTPATIENT)
Dept: LAB | Facility: CLINIC | Age: 73
End: 2023-03-29

## 2023-03-29 ENCOUNTER — HOSPITAL ENCOUNTER (OUTPATIENT)
Dept: RESEARCH | Facility: CLINIC | Age: 73
Discharge: HOME | End: 2023-03-29
Attending: INTERNAL MEDICINE
Payer: MEDICARE

## 2023-03-29 LAB
CORTIS SERPL-MCNC: 4.8 UG/DL
CRP SERPL-MCNC: 6.22 MG/L
ERYTHROCYTE [DISTWIDTH] IN BLOOD BY AUTOMATED COUNT: 13.5 % (ref 10–15)
ERYTHROCYTE [SEDIMENTATION RATE] IN BLOOD BY WESTERGREN METHOD: 22 MM/HR (ref 0–30)
GLUCOSE SERPL-MCNC: 99 MG/DL (ref 70–99)
HCT VFR BLD AUTO: 39.1 % (ref 35–47)
HGB BLD-MCNC: 12.8 G/DL (ref 11.7–15.7)
MCH RBC QN AUTO: 31.8 PG (ref 26.5–33)
MCHC RBC AUTO-ENTMCNC: 32.7 G/DL (ref 31.5–36.5)
MCV RBC AUTO: 97 FL (ref 78–100)
PLATELET # BLD AUTO: 309 10E3/UL (ref 150–450)
RBC # BLD AUTO: 4.02 10E6/UL (ref 3.8–5.2)
WBC # BLD AUTO: 5.7 10E3/UL (ref 4–11)

## 2023-03-29 PROCEDURE — 82533 TOTAL CORTISOL: CPT | Performed by: INTERNAL MEDICINE

## 2023-03-29 PROCEDURE — 82947 ASSAY GLUCOSE BLOOD QUANT: CPT | Performed by: INTERNAL MEDICINE

## 2023-03-29 PROCEDURE — 300N000003 HC RESEARCH SPECIMEN PROCESSING, SIMPLE

## 2023-03-29 PROCEDURE — 86140 C-REACTIVE PROTEIN: CPT | Performed by: INTERNAL MEDICINE

## 2023-03-29 PROCEDURE — 510N000009 HC RESEARCH FACILITY, PER 15 MIN

## 2023-03-29 PROCEDURE — 510N000017 HC CRU PATIENT CARE, PER 15 MIN

## 2023-03-29 PROCEDURE — 85652 RBC SED RATE AUTOMATED: CPT | Performed by: INTERNAL MEDICINE

## 2023-03-29 PROCEDURE — 85027 COMPLETE CBC AUTOMATED: CPT | Performed by: INTERNAL MEDICINE

## 2023-03-29 NOTE — ADDENDUM NOTE
Encounter addended by: Samir Mora on: 3/29/2023 5:30 PM   Actions taken: Charge Capture section accepted

## 2023-03-30 NOTE — ADDENDUM NOTE
Encounter addended by: Dianna Bess LPN on: 3/29/2023 7:55 PM   Actions taken: Charge Capture section accepted

## 2023-04-04 NOTE — ADDENDUM NOTE
Encounter addended by: Radha Goldman RN on: 4/4/2023 10:24 AM   Actions taken: Care Plan modified, Charge Capture section accepted

## 2023-04-12 ENCOUNTER — LAB REQUISITION (OUTPATIENT)
Dept: LAB | Facility: CLINIC | Age: 73
End: 2023-04-12

## 2023-04-12 ENCOUNTER — HOSPITAL ENCOUNTER (OUTPATIENT)
Dept: RESEARCH | Facility: CLINIC | Age: 73
Discharge: HOME | End: 2023-04-12
Attending: INTERNAL MEDICINE
Payer: MEDICARE

## 2023-04-12 LAB
CORTIS SERPL-MCNC: 6.5 UG/DL
CRP SERPL-MCNC: 3.46 MG/L
ERYTHROCYTE [DISTWIDTH] IN BLOOD BY AUTOMATED COUNT: 13.1 % (ref 10–15)
ERYTHROCYTE [SEDIMENTATION RATE] IN BLOOD BY WESTERGREN METHOD: 21 MM/HR (ref 0–30)
GLUCOSE SERPL-MCNC: 93 MG/DL (ref 70–99)
HCT VFR BLD AUTO: 37.8 % (ref 35–47)
HGB BLD-MCNC: 12.3 G/DL (ref 11.7–15.7)
HOLD SPECIMEN: NORMAL
MCH RBC QN AUTO: 31.7 PG (ref 26.5–33)
MCHC RBC AUTO-ENTMCNC: 32.5 G/DL (ref 31.5–36.5)
MCV RBC AUTO: 97 FL (ref 78–100)
PLATELET # BLD AUTO: 293 10E3/UL (ref 150–450)
RBC # BLD AUTO: 3.88 10E6/UL (ref 3.8–5.2)
WBC # BLD AUTO: 5.9 10E3/UL (ref 4–11)

## 2023-04-12 PROCEDURE — 86140 C-REACTIVE PROTEIN: CPT | Performed by: INTERNAL MEDICINE

## 2023-04-12 PROCEDURE — 510N000017 HC CRU PATIENT CARE, PER 15 MIN

## 2023-04-12 PROCEDURE — 510N000009 HC RESEARCH FACILITY, PER 15 MIN

## 2023-04-12 PROCEDURE — 300N000003 HC RESEARCH SPECIMEN PROCESSING, SIMPLE

## 2023-04-12 PROCEDURE — 85027 COMPLETE CBC AUTOMATED: CPT | Performed by: INTERNAL MEDICINE

## 2023-04-12 PROCEDURE — 85652 RBC SED RATE AUTOMATED: CPT | Performed by: INTERNAL MEDICINE

## 2023-04-12 PROCEDURE — 82947 ASSAY GLUCOSE BLOOD QUANT: CPT | Performed by: INTERNAL MEDICINE

## 2023-04-12 PROCEDURE — 82533 TOTAL CORTISOL: CPT | Performed by: INTERNAL MEDICINE

## 2023-04-12 NOTE — ADDENDUM NOTE
Encounter addended by: Samir Mora on: 4/12/2023 4:40 PM   Actions taken: Charge Capture section accepted

## 2023-05-03 ENCOUNTER — HOSPITAL ENCOUNTER (OUTPATIENT)
Dept: RESEARCH | Facility: CLINIC | Age: 73
Discharge: HOME | End: 2023-05-03
Attending: INTERNAL MEDICINE
Payer: MEDICARE

## 2023-05-03 PROCEDURE — 300N000003 HC RESEARCH SPECIMEN PROCESSING, SIMPLE

## 2023-05-03 PROCEDURE — 510N000017 HC CRU PATIENT CARE, PER 15 MIN

## 2023-05-03 PROCEDURE — 510N000009 HC RESEARCH FACILITY, PER 15 MIN

## 2023-05-10 ENCOUNTER — TELEPHONE (OUTPATIENT)
Dept: FAMILY MEDICINE | Facility: CLINIC | Age: 73
End: 2023-05-10

## 2023-05-10 NOTE — TELEPHONE ENCOUNTER
Pt was administered TDVAX Suspension 2/2/LF/0.5ML at clinic visit on 12/27/2022.  We received a Notice of Denial of Medicare Part D Prescription Drug Coverage.    TARYN Liu, RN  05/10/23, 4:24 PM

## 2023-05-23 NOTE — ADDENDUM NOTE
Encounter addended by: Radha Goldman RN on: 5/23/2023 10:28 AM   Actions taken: Charge Capture section accepted

## 2023-06-11 ENCOUNTER — HEALTH MAINTENANCE LETTER (OUTPATIENT)
Age: 73
End: 2023-06-11

## 2023-06-12 ENCOUNTER — OFFICE VISIT (OUTPATIENT)
Dept: FAMILY MEDICINE | Facility: CLINIC | Age: 73
End: 2023-06-12
Payer: MEDICARE

## 2023-06-12 DIAGNOSIS — Z13.220 LIPID SCREENING: ICD-10-CM

## 2023-06-12 DIAGNOSIS — R55 SYNCOPE, UNSPECIFIED SYNCOPE TYPE: ICD-10-CM

## 2023-06-12 DIAGNOSIS — Z30.432 ENCOUNTER FOR IUD REMOVAL: ICD-10-CM

## 2023-06-12 DIAGNOSIS — N89.8 VAGINAL DISCHARGE: ICD-10-CM

## 2023-06-12 DIAGNOSIS — Z12.31 VISIT FOR SCREENING MAMMOGRAM: ICD-10-CM

## 2023-06-12 DIAGNOSIS — R03.0 ELEVATED BLOOD PRESSURE READING WITHOUT DIAGNOSIS OF HYPERTENSION: ICD-10-CM

## 2023-06-12 DIAGNOSIS — R87.820 PAP SMEAR OF CERVIX SHOWS LOW RISK HPV PRESENT: ICD-10-CM

## 2023-06-12 DIAGNOSIS — Z00.00 ENCOUNTER FOR MEDICARE ANNUAL WELLNESS EXAM: Primary | ICD-10-CM

## 2023-06-12 DIAGNOSIS — R42 DIZZINESS: ICD-10-CM

## 2023-06-12 DIAGNOSIS — E28.39 ESTROGEN DEFICIENCY: ICD-10-CM

## 2023-06-12 DIAGNOSIS — Z12.11 SCREEN FOR COLON CANCER: ICD-10-CM

## 2023-06-12 LAB
CLUE CELLS: ABNORMAL
TRICHOMONAS, WET PREP: ABNORMAL
WBC'S/HIGH POWER FIELD, WET PREP: ABNORMAL
YEAST, WET PREP: PRESENT

## 2023-06-12 PROCEDURE — G0145 SCR C/V CYTO,THINLAYER,RESCR: HCPCS | Mod: ORL | Performed by: INTERNAL MEDICINE

## 2023-06-12 PROCEDURE — 87624 HPV HI-RISK TYP POOLED RSLT: CPT | Mod: ORL | Performed by: INTERNAL MEDICINE

## 2023-06-12 PROCEDURE — 80061 LIPID PANEL: CPT | Mod: ORL | Performed by: INTERNAL MEDICINE

## 2023-06-12 RX ORDER — FLUCONAZOLE 150 MG/1
150 TABLET ORAL ONCE
Qty: 1 TABLET | Refills: 0 | Status: SHIPPED | OUTPATIENT
Start: 2023-06-12 | End: 2023-06-12

## 2023-06-12 ASSESSMENT — ENCOUNTER SYMPTOMS
SHORTNESS OF BREATH: 0
PALPITATIONS: 0
DIZZINESS: 1
HEMATOCHEZIA: 0
NERVOUS/ANXIOUS: 0
CONSTIPATION: 0
MYALGIAS: 0
CHILLS: 0
PARESTHESIAS: 0
JOINT SWELLING: 1
ABDOMINAL PAIN: 0
ARTHRALGIAS: 1
NAUSEA: 0
DIARRHEA: 0
HEMATURIA: 0
FEVER: 0
HEARTBURN: 0
COUGH: 0
HEADACHES: 0
DYSURIA: 0
EYE PAIN: 0
SORE THROAT: 0
WEAKNESS: 0
BREAST MASS: 0
FREQUENCY: 0

## 2023-06-12 ASSESSMENT — ACTIVITIES OF DAILY LIVING (ADL): CURRENT_FUNCTION: NO ASSISTANCE NEEDED

## 2023-06-12 NOTE — PATIENT INSTRUCTIONS
Patient Education   Personalized Prevention Plan  You are due for the preventive services outlined below.  Your care team is available to assist you in scheduling these services.  If you have already completed any of these items, please share that information with your care team to update in your medical record.  Health Maintenance Due   Topic Date Due     Discuss Advance Care Planning  Never done     Mammogram  03/14/2019     Colorectal Cancer Screening  07/19/2022     FALL RISK ASSESSMENT  07/20/2022     COVID-19 Vaccine (6 - Booster for Moderna series) 11/14/2022     Preventive Health Recommendations    See your health care provider every year to    Review health changes.     Discuss preventive care.      Review your medicines if your doctor has prescribed any.    You no longer need a yearly Pap test unless you've had an abnormal Pap test in the past 10 years. If you have vaginal symptoms, such as bleeding or discharge, be sure to talk with your provider about a Pap test.    Every 1 to 2 years, have a mammogram.  If you are over 69, talk with your health care provider about whether or not you want to continue having screening mammograms.    Every 10 years, have a colonoscopy. Or, have a yearly FIT test (stool test). These exams will check for colon cancer.     Have a cholesterol test every 5 years, or more often if your doctor advises it.     Have a diabetes test (fasting glucose) every three years. If you are at risk for diabetes, you should have this test more often.     At age 65, have a bone density scan (DEXA) to check for osteoporosis (brittle bone disease).    Shots:    Get a flu shot each year.    Get a tetanus shot every 10 years.    Talk to your doctor about your pneumonia vaccines. There are now two you should receive - Pneumovax (PPSV 23) and Prevnar (PCV 13).    Talk to your pharmacist about the shingles vaccine.    Talk to your doctor about the hepatitis B vaccine.    Nutrition:     Eat at least 5  servings of fruits and vegetables each day.    Eat whole-grain bread, whole-wheat pasta and brown rice instead of white grains and rice.    Get adequate Calcium and Vitamin D.     Lifestyle    Exercise at least 150 minutes a week (30 minutes a day, 5 days a week). This will help you control your weight and prevent disease.    Limit alcohol to one drink per day.    No smoking.     Wear sunscreen to prevent skin cancer.     See your dentist twice a year for an exam and cleaning.    See your eye doctor every 1 to 2 years to screen for conditions such as glaucoma, macular degeneration and cataracts.    Personalized Prevention Plan  You are due for the preventive services outlined below.  Your care team is available to assist you in scheduling these services.  If you have already completed any of these items, please share that information with your care team to update in your medical record.  Health Maintenance   Topic Date Due     ADVANCE CARE PLANNING  Never done     MAMMO SCREENING  03/14/2019     COLORECTAL CANCER SCREENING  07/19/2022     FALL RISK ASSESSMENT  07/20/2022     COVID-19 Vaccine (6 - Booster for Moderna series) 11/14/2022     MEDICARE ANNUAL WELLNESS VISIT  06/12/2024     LIPID  03/19/2026     DEXA  01/25/2032     DTAP/TDAP/TD IMMUNIZATION (3 - Td or Tdap) 12/27/2032     HEPATITIS C SCREENING  Completed     PHQ-2 (once per calendar year)  Completed     INFLUENZA VACCINE  Completed     Pneumococcal Vaccine: 65+ Years  Completed     ZOSTER IMMUNIZATION  Completed     IPV IMMUNIZATION  Aged Out     MENINGITIS IMMUNIZATION  Aged Out

## 2023-06-12 NOTE — PROGRESS NOTES
Rosa Larson is a 72 yo woman with hx of osteoarthritis of left knee, status post replacement. She is here for a preventive exam.  She is not fasting. She is up to date on eye exams and dental visits.      HCM  Advanced Directive: none on file, has one at home and will send in to clinic  COVID vaccine series: 2 bivalent boosters completed  Other vaccinations: Up to date  Pap: 2017 HPV positive (Other HR HPV) and normal Pap, 2018 normal Pap with neg HPV, 1 additional Pap recommended prior to ceasing of surveillance Pap smears  Mammogram: Last completed 3/14/2017, showed a benign epidermal inclusion/sebaceous cyst, due now  Colonoscopy: FIT card negative on 7/19/2021  DEXA last completed on 1/25/2017 with most negative and valid T-score of -1.4 at the level of the left femoral neck. Repeat due now.     Hearing concerns: none  Fall Risk: no  Independent at home: yes  Safe : yes  Memory concerns: no    COGNITIVE SCREEN  1) Repeat 3 items (Banana, Sunrise, Chair)    2) Clock draw: NORMAL  3) 3 item recall: Recalls 3 objects  Results: 3 items recalled: COGNITIVE IMPAIRMENT LESS LIKELY    Mini-CogTM Copyright S Lucrecia. Licensed by the author for use in [facility]; reprinted with permission (lupillo@.[facility]). All rights reserved.      Diet: Regular  Wt Readings from Last 4 Encounters:   06/12/23 77.6 kg (171 lb)   12/27/22 74.8 kg (165 lb)   11/29/21 75.2 kg (165 lb 12.8 oz)   10/01/21 72.2 kg (159 lb 1.9 oz)     Body mass index is 26.68 kg/m .    Dizziness  Rosa reports intermittent dizziness for the past 4 months. She will have brief episodes of feeling like she needs to hold on to something or her vision darkening, but her hearing will still be present and she will be aware. She had one episode witnessed by her spouse. He estimated symptoms lasted about 5 seconds. One episode happened after a busy Easter morning and another occurred on an especially hot day, but she is not sure if her episodes are always in  "similar situations. She estimates a total of about 8 episodes of dizziness over the past 4 months, not increasing in frequency. Her dizziness is not triggered by position change.  Denies syncope or falling. No chest pain or palpitations.     Vaginal Discharge  Rosa reports intermittent vaginal discharge for the last month. She describes an occasional spot on her underwear. She notes that she will typically have some amount of discharge once daily. This is yellow in color and has a \"sticky quality\" to it. Denies itching or vaginal bleeding. Not sexually active.     Elevated BP Reading  Rosa's initial BP in clinic was elevated at 153/64 today. She is not taking antihypertensives.  Blood pressure is repeated later in the visit, 138/84.    BP Readings from Last 3 Encounters:   06/12/23 (!) 153/64   12/27/22 (!) 143/82   08/29/22 122/62     Rheumatoid Arthritis  Rosa follows with rheumatology for her history of rheumatoid arthritis, which mainly affects her hands. She takes methotrexate 20 mg weekly and folic acid 1 mg daily. She states that her symptoms are mild with use of the medication.  She occasionally gets joint swelling and notes that she was unable to put on her wedding ring today.     Current Outpatient Medications   Medication Sig Dispense Refill     calcium carbonate (OS-LUNA) 500 MG tablet Take 1 tablet by mouth 2 times daily       folic acid (FOLVITE) 1 MG tablet Take 1 tablet (1 mg) by mouth daily 90 tablet 3     methotrexate 2.5 MG tablet Take 8 tablets (20 mg) by mouth every 7 days Labs every 8 - 12 weeks for refills. On your methotrexate day, one day per week, take 4 tablets in the morning and 4 tablets in the evening. Take folic acid every day. 96 tablet 3     Omega-3 Fatty Acids (FISH OIL) 1200 MG capsule Take 1 daily 180 capsule 3     vitamin C (ASCORBIC ACID) 500 MG tablet Take one daily 30 tablet 11     vitamin D3 (CHOLECALCIFEROL) 1000 units (25 mcg) tablet Take 1 tablet (1,000 Units) by mouth " 30 tablet 11        PMH, PSH, FH, medications, allergies and immunizations are reviewed/ updated this visit.     Social  , spouse Zev  No children  Retired, worked in commercial design     HABITS:  Tob: none   ETOH: 2/week  Calcium: 1/day, taking Calcium supplement 500 mg twice daily and vitamin D supplement 1000 international unit(s) daily.   Caffeine: 1/day of some type of caffeinated drink, coffee is decaf  Exercise: 5 mile walk twice weekly     OB/GYN HISTORY:  LMP: menopause age 55, no HRT, no vaginal bleeding. Vaginal discharge as above  Hx abnormal pap?  ASCUS with +HPV in 2013, normal Pap with +Other HR HPV in 2017, normal Pap with neg HPV in 2018  STD hx?  none  Vasomotor sx:  none  G 0   P 0   A 0  Self Breast exam:  yes, in the shower    Current Outpatient Medications   Medication Sig Dispense Refill     calcium carbonate (OS-LUNA) 500 MG tablet Take 1 tablet by mouth 2 times daily       folic acid (FOLVITE) 1 MG tablet Take 1 tablet (1 mg) by mouth daily 90 tablet 3     methotrexate 2.5 MG tablet Take 8 tablets (20 mg) by mouth every 7 days Labs every 8 - 12 weeks for refills. On your methotrexate day, one day per week, take 4 tablets in the morning and 4 tablets in the evening. Take folic acid every day. 96 tablet 3     Omega-3 Fatty Acids (FISH OIL) 1200 MG capsule Take 1 daily 180 capsule 3     vitamin C (ASCORBIC ACID) 500 MG tablet Take one daily 30 tablet 11     vitamin D3 (CHOLECALCIFEROL) 1000 units (25 mcg) tablet Take 1 tablet (1,000 Units) by mouth 30 tablet 11     No Known Allergies      ROS  CONSTITUTIONAL:NEGATIVE for fever, chills, +10 pound weight gain in the past 2 years  INTEGUMENTARY/SKIN: NEGATIVE for worrisome rashes, moles or lesions  EYES: NEGATIVE for vision changes or irritation  ENT/MOUTH: NEGATIVE for ear, mouth and throat problems  RESP:NEGATIVE for significant cough or SOB  BREAST: NEGATIVE for masses, tenderness or discharge.  She has history of a small epidermal cyst  "in the right breast, unchanged  CV: NEGATIVE for chest pain, palpitations, BURLESON, orthopnea, PND  or peripheral edema, + episodic dizzy episodes as above  GI: NEGATIVE for nausea, abdominal pain, heartburn, or change in bowel habits  :NEGATIVE for frequency, dysuria, or hematuria  MUSCULOSKELETAL:+ History of rheumatoid arthritis mainly affecting her hands, stable on methotrexate. + Hx of varicose veins  NEURO: NEGATIVE for weakness, or paresthesias  ENDOCRINE: NEGATIVE for polyuria/dipsia,  temperature intolerance, skin/hair changes  HEME/ALLERGY/IMMUNE: NEGATIVE for bleeding problems  PSYCHIATRIC: NEGATIVE for changes in mood or affect    EXAM  BP (!) 153/64   Pulse 82   Temp 98  F (36.7  C)   Ht 1.705 m (5' 7.13\")   Wt 77.6 kg (171 lb)   SpO2 99%   BMI 26.68 kg/m    GENERAL APPEARANCE: Alert, pleasant, NAD  EYES: PERRL, EOMI, conjunctiva clear  HENT: TM normal bilaterally. Nose and mouth without lesions  NECK: no adenopathy, thyroid normal to palpation   RESP: lungs clear to auscultation bilaterally  BREAST: Right breast with subcutaneous palpable BB sized smooth nodule at 2 o'clock position roughly 4 fingerbreadths from the nipple no overlying skin changes, no tenderness or nipple discharge. No palpable axillary masses or adenopathy  CV: regular rate and rhythm, normal S1 S2, no murmur, no carotid bruits  ABDOMEN: soft, nontender, without HSM or masses. Bowel sounds normal  : External genitalia without lesions, mucosa is pale, scant clear discharge in vault. cervix normal, pap easily obtained, string noted protruding from cerivcal os.  bimanual exam without adnexal masses or tenderness, uterus non enlarged  MS: Synovial thickening noted over the right PIP joints and first MCP joint, no redness, otherwise negative  SKIN: no suspicious lesions or rashes  NEURO: Normal strength and tone, sensory exam grossly normal, DTR normoreflexive in upper and lower extremities  PSYCH: mentation appears normal. and " affect normal/bright.  EXT: no peripheral edema, pedal pulses palpable    Assessment:  (Z00.00) Encounter for Medicare annual wellness exam  (primary encounter diagnosis)  Comment: Healthy 73 year old woman.   Plan: Today we discussed ways to maintain a healthy lifestyle with sensible eating, regular exercise and self cares. We dicussed calcium and Vitamin D intake, vaccinations and preventive health screens.     (Z12.31) Visit for screening mammogram  Comment: Routine screening.  She has a normal clinical exam plan: Mammogram - routine screening          (Z12.11) Screen for colon cancer  Comment: Routine screening.  She is low risk and prefers FIT testing.  Plan: Fecal colorectal cancer screen FIT          (R87.820) Pap smear of cervix shows low risk HPV present  Comment: Normal Pap with Other HR HPV positive in 2017, normal Pap with neg HPV in 2018.  Now due for surveillance screening.  Plan: Gynecologic Cytology (PAP Smear)        Pap completed, results pending. If normal with neg HPV, no further Paps indicated.   Addendum: Pap smear and HPV testing are negative, no further surveillance is indicated    (E28.39) Estrogen deficiency  Comment: History of osteopenia on DEXA in 2017, lowest T score -1.4, now due for DEXA scan  Plan: Dexa hip/pelvis/spine*        Discussed adequate calcium and vitamin D intake, weightbearing exercise    (Z13.220) Lipid screening  Comment: Not fasting.  Total and LDL cholesterol slightly elevated, does not meet criteria for medication  Recent Labs   Lab Test 06/12/23  1350 03/19/21  0828   CHOL 206* 175.0   HDL 66 63.0   * 99.0   TRIG 80 63.0   CHOLHDLRATIO  --  2.8   Plan: Lipid Profile        Discussed healthy lifestyle, eating habits, adding whole grains and high fiber foods    (R03.0) Elevated blood pressure reading without diagnosis of hypertension  Comment: Blood pressure coming down at end of visit  BP Readings from Last 3 Encounters:   06/12/23 138/84   12/27/22 (!)  "143/82   08/29/22 122/62   Plan: Continue monitoring at follow-up visits    (R42) Dizziness  Comment: Episodic dizziness over the past 4 months, unclear as to triggers.  Differential diagnosis is broad but she denies palpitations or chest pain.  This is not orthostatic hypotension.  Plan: Consider event monitor if symptoms are escalating, becoming more frequent.  Plan discussed with patient.    (N89.8) Vaginal discharge  Comment: Scant clear vaginal discharge over the past month, wet prep identifies hyphae  Plan: fluconazole (DIFLUCAN) 150 MG tablet, Wet         preparation - Clinic Collect        Recommend empiric treatment with Diflucan, 150 mg dose x1    (Z30.432) Encounter for IUD removal  Comment: IUD string is noted during Pap smear.  She is states this may have been in place since her 40s.  There is no evidence of any irritation or bleeding.  Actinomyces species is present on Pap smear, which may be associated with IUD placement  Plan: Ob/Gyn Referral        Refer back to OB/GYN to have the IUD removed.  If vaginal discharge is still present, may need empiric treatment with penicillin.  At this time, she denies pelvic pain or fever, no evidence for PID    I have reviewed, edited and approved the above scribed note.    Thi Harvey MD  Internal Medicine/Pediatrics      I, Brigid Lester, am serving as a scribe to document services personally performed by Dr. Thi Harvey, based on data collection and the provider's statements to me.       Answers for HPI/ROS submitted by the patient on 6/12/2023  In general, how would you rate your overall physical health?: good  Frequency of exercise:: 4-5 days/week  Do you usually eat at least 4 servings of fruit and vegetables a day, include whole grains & fiber, and avoid regularly eating high fat or \"junk\" foods? : Yes  Medication side effects:: Not applicable  Activities of Daily Living: no assistance needed  Home safety: no safety concerns identified  Hearing " Impairment:: difficulty following a conversation in a noisy restaurant or crowded room  In the past 6 months, have you been bothered by leaking of urine?: No  abdominal pain: No  Blood in stool: No  Blood in urine: No  chest pain: No  chills: No  congestion: No  constipation: No  cough: No  diarrhea: No  dizziness: Yes  ear pain: No  eye pain: No  nervous/anxious: No  fever: No  frequency: No  genital sores: No  headaches: No  hearing loss: No  heartburn: No  arthralgias: Yes  joint swelling: Yes  peripheral edema: No  mood changes: No  myalgias: No  nausea: No  dysuria: No  palpitations: No  Skin sensation changes: No  sore throat: No  urgency: No  rash: No  shortness of breath: No  visual disturbance: No  weakness: No  pelvic pain: No  vaginal bleeding: No  vaginal discharge: Yes  tenderness: No  breast mass: No  breast discharge: No  In general, how would you rate your overall mental or emotional health?: good  Duration of exercise:: 45-60 minutes

## 2023-06-13 LAB
CHOLEST SERPL-MCNC: 206 MG/DL
HDLC SERPL-MCNC: 66 MG/DL
LDLC SERPL CALC-MCNC: 124 MG/DL
NONHDLC SERPL-MCNC: 140 MG/DL
TRIGL SERPL-MCNC: 80 MG/DL

## 2023-06-15 LAB
BKR LAB AP GYN ADEQUACY: NORMAL
BKR LAB AP GYN INTERPRETATION: NORMAL
BKR LAB AP GYN OTHER FINDINGS: NORMAL
BKR LAB AP HPV REFLEX: NORMAL
BKR LAB AP LMP: NORMAL
BKR LAB AP PREVIOUS ABNORMAL: NORMAL
PATH REPORT.COMMENTS IMP SPEC: NORMAL
PATH REPORT.COMMENTS IMP SPEC: NORMAL
PATH REPORT.RELEVANT HX SPEC: NORMAL

## 2023-06-16 LAB
HUMAN PAPILLOMA VIRUS 16 DNA: NEGATIVE
HUMAN PAPILLOMA VIRUS 18 DNA: NEGATIVE
HUMAN PAPILLOMA VIRUS FINAL DIAGNOSIS: NORMAL
HUMAN PAPILLOMA VIRUS OTHER HR: NEGATIVE

## 2023-06-17 VITALS
SYSTOLIC BLOOD PRESSURE: 138 MMHG | HEIGHT: 67 IN | DIASTOLIC BLOOD PRESSURE: 84 MMHG | TEMPERATURE: 98 F | BODY MASS INDEX: 26.84 KG/M2 | WEIGHT: 171 LBS | HEART RATE: 82 BPM | OXYGEN SATURATION: 99 %

## 2023-06-17 PROBLEM — R42 DIZZINESS: Status: ACTIVE | Noted: 2023-06-17

## 2023-06-17 PROBLEM — R03.0 ELEVATED BLOOD PRESSURE READING WITHOUT DIAGNOSIS OF HYPERTENSION: Status: ACTIVE | Noted: 2023-06-17

## 2023-06-21 ENCOUNTER — ANCILLARY PROCEDURE (OUTPATIENT)
Dept: CARDIOLOGY | Facility: CLINIC | Age: 73
End: 2023-06-21
Attending: INTERNAL MEDICINE
Payer: MEDICARE

## 2023-06-21 ENCOUNTER — TRANSFERRED RECORDS (OUTPATIENT)
Dept: HEALTH INFORMATION MANAGEMENT | Facility: CLINIC | Age: 73
End: 2023-06-21

## 2023-06-21 DIAGNOSIS — R55 SYNCOPE, UNSPECIFIED SYNCOPE TYPE: ICD-10-CM

## 2023-06-21 DIAGNOSIS — R42 DIZZINESS: ICD-10-CM

## 2023-06-21 PROCEDURE — 93272 ECG/REVIEW INTERPRET ONLY: CPT | Performed by: INTERNAL MEDICINE

## 2023-06-21 PROCEDURE — 93270 REMOTE 30 DAY ECG REV/REPORT: CPT

## 2023-06-22 NOTE — PROGRESS NOTES
"Per Dr. Harvey, patient to have 30 day event monitor placed.  Diagnosis: dizziness [R42] and syncope [R55]  Monitor placed: {YES / NO:763531::\"Yes\"}  Patient Instructed: {YES / NO:871453::\"Yes\"}  Patient verbalized understanding: {YES / NO:745228::\"Yes\"}  Holter # [id number]  "

## 2023-06-25 PROCEDURE — 82274 ASSAY TEST FOR BLOOD FECAL: CPT | Mod: ORL | Performed by: INTERNAL MEDICINE

## 2023-06-28 LAB — HEMOCCULT STL QL IA: NEGATIVE

## 2023-07-05 NOTE — PROGRESS NOTES
"IUD Removal:  SUBJECTIVE:    Is a pregnancy test required: No.  Was a consent obtained?  Yes    Rosa Larson is a 73 year old female,, No LMP recorded. Patient is postmenopausal. who presents today for IUD removal. Her current IUD was placed when patient was in her 40s. She has not had problems with the IUD and didn't think it was an issue to keep it in. She requests removal of the IUD after referral from her PCP. There was Actinomyces found our her last pap which her PCP is planning to treat following IUD removal. Rosa is not sure of the type or name of the IUD that she currently has.     Today's PHQ-2 Score:     2023    11:20 AM   PHQ-2 (  [employer])   Q1: Little interest or pleasure in doing things 0   Q2: Feeling down, depressed or hopeless 0   PHQ-2 Score 0       PROCEDURE:    A speculum exam was performed and the cervix was visualized. The IUD string was visualized. Using ring forceps, the string  was grasped and the IUD removed. It is an asymmetrical plastic piece that is shaped like a \"7\" and has no metal/copper.      POST PROCEDURE:    The patient tolerated the procedure well. Patient was discharged in stable condition.    Will review IUD for type and completeness. Pt is active on [employer] and prefers a message be sent with any information or follow-up instructions.     Call if bleeding, pain or fever occur.    GRISELDA Richardson CNM    ADDENDUM at 1450:  Reviewed past IUD types and size. IUD appears to be Copper 7. Plastic piece is intact, but copper wiring is missing, likely disintegrated/disolved. Spoke with Dr. Robles who agrees and states it is an option to do a pelvic ultrasound to look for any fragments of copper that may be left in the uterus. Discussed with TOMÁS Bose who is going to call patient to notify and discuss this option.     GRISELDA Richardson CNM    "

## 2023-07-06 ENCOUNTER — MYC MEDICAL ADVICE (OUTPATIENT)
Dept: OBGYN | Facility: CLINIC | Age: 73
End: 2023-07-06

## 2023-07-06 ENCOUNTER — MYC MEDICAL ADVICE (OUTPATIENT)
Dept: FAMILY MEDICINE | Facility: CLINIC | Age: 73
End: 2023-07-06

## 2023-07-06 ENCOUNTER — TELEPHONE (OUTPATIENT)
Dept: OBGYN | Facility: CLINIC | Age: 73
End: 2023-07-06
Payer: MEDICARE

## 2023-07-06 ENCOUNTER — OFFICE VISIT (OUTPATIENT)
Dept: OBGYN | Facility: CLINIC | Age: 73
End: 2023-07-06
Attending: MIDWIFE
Payer: MEDICARE

## 2023-07-06 VITALS
DIASTOLIC BLOOD PRESSURE: 67 MMHG | SYSTOLIC BLOOD PRESSURE: 138 MMHG | BODY MASS INDEX: 26.21 KG/M2 | WEIGHT: 168 LBS | HEART RATE: 96 BPM

## 2023-07-06 DIAGNOSIS — Z30.432 ENCOUNTER FOR IUD REMOVAL: Primary | ICD-10-CM

## 2023-07-06 DIAGNOSIS — A42.9 ACTINOMYCES INFECTION: Primary | ICD-10-CM

## 2023-07-06 PROCEDURE — 58301 REMOVE INTRAUTERINE DEVICE: CPT | Performed by: MIDWIFE

## 2023-07-06 RX ORDER — PENICILLIN V POTASSIUM 500 MG/1
500 TABLET, FILM COATED ORAL 3 TIMES DAILY
Qty: 30 TABLET | Refills: 0 | Status: SHIPPED | OUTPATIENT
Start: 2023-07-06 | End: 2023-07-16

## 2023-07-06 ASSESSMENT — PAIN SCALES - GENERAL: PAINLEVEL: NO PAIN (0)

## 2023-07-06 NOTE — LETTER
"2023       RE: Rosa Larson  [address]  [place] [address]     Dear Colleague,    Thank you for referring your patient, Rosa Larson, to the [facility] [place] at [facility]. Please see a copy of my visit note below.    IUD Removal:  SUBJECTIVE:    Is a pregnancy test required: No.  Was a consent obtained?  Yes    Rosa Larson is a 73 year old female,, No LMP recorded. Patient is postmenopausal. who presents today for IUD removal. Her current IUD was placed when patient was in her 40s. She has not had problems with the IUD and didn't think it was an issue to keep it in. She requests removal of the IUD after referral from her PCP. There was Actinomyces found our her last pap which her PCP is planning to treat following IUD removal. Rosa is not sure of the type or name of the IUD that she currently has.     Today's PHQ-2 Score:     2023    11:20 AM   PHQ-2 (  [employer])   Q1: Little interest or pleasure in doing things 0   Q2: Feeling down, depressed or hopeless 0   PHQ-2 Score 0       PROCEDURE:    A speculum exam was performed and the cervix was visualized. The IUD string was visualized. Using ring forceps, the string  was grasped and the IUD removed. It is an asymmetrical plastic piece that is shaped like a \"7\" and has no metal/copper.      POST PROCEDURE:    The patient tolerated the procedure well. Patient was discharged in stable condition.    Will review IUD for type and completeness. Pt is active on [employer] and prefers a message be sent with any information or follow-up instructions.     Call if bleeding, pain or fever occur.    GRISELDA Richardson [facility]    ADDENDUM at 1450:  Reviewed past IUD types and size. IUD appears to be Copper 7. Plastic piece is intact, but copper wiring is missing, likely disintegrated/disolved. Spoke with Dr. Robles who agrees and states it is an option to do a pelvic ultrasound " to look for any fragments of copper that may be left in the uterus. Discussed with TOMÁS Bose who is going to call patient to notify and discuss this option.     GRISELDA RichardsonM

## 2023-07-06 NOTE — TELEPHONE ENCOUNTER
Rosa called the triage line.  She had an IUD removed today and is experiencing light spotting.  I told her this can be normal, but I would run it past the provider she saw, as her case is unusual (IUD placed ~30 years ago, unsure if what was removed today is the full IUD).      Per Radha Chang and Dr. Robles, the patient requires no follow-up, as it appears all plastic parts were removed, and the copper most likely dissolved.  Rosa should call back or head to ED if bleeding becomes heavy or she has severe cramping.     I called and left a VM, letting Rosa know this.

## 2023-07-06 NOTE — TELEPHONE ENCOUNTER
IUD removed by ob/gyn team.  She reported some vaginal discharge at time of preventive exam.    I recommended treatment with Pen VK once IUD was removed.    rx sent to [facility] in the [place] for Pen VK 500mg po tid x 10 days.     Thi Harvey MD  Internal Medicine/Pediatrics

## 2023-07-12 ENCOUNTER — MYC MEDICAL ADVICE (OUTPATIENT)
Dept: RHEUMATOLOGY | Facility: CLINIC | Age: 73
End: 2023-07-12
Payer: MEDICARE

## 2023-07-12 DIAGNOSIS — M05.742 RHEUMATOID ARTHRITIS INVOLVING BOTH HANDS WITH POSITIVE RHEUMATOID FACTOR (H): ICD-10-CM

## 2023-07-12 DIAGNOSIS — M05.741 RHEUMATOID ARTHRITIS INVOLVING BOTH HANDS WITH POSITIVE RHEUMATOID FACTOR (H): ICD-10-CM

## 2023-07-12 DIAGNOSIS — Z79.899 HIGH RISK MEDICATION USE: ICD-10-CM

## 2023-07-12 NOTE — TELEPHONE ENCOUNTER
Returned call to patient and reviewed that it is recommended to hold methotrexate when antibiotic therapy is indicated.  Patient was not even aware that she had a vaginal infection, she is asymptomatic.    Patient will follow-up with the provider that is recommending antibiotic therapy to determine if it is necessary and will reach back out to this RN with further questions.    Saloni Villela, KENNETHN, RN  RN Care Coordinator Rheumatology

## 2023-07-17 ENCOUNTER — OFFICE VISIT (OUTPATIENT)
Dept: FAMILY MEDICINE | Facility: CLINIC | Age: 73
End: 2023-07-17
Payer: MEDICARE

## 2023-07-17 VITALS
SYSTOLIC BLOOD PRESSURE: 126 MMHG | TEMPERATURE: 97.3 F | BODY MASS INDEX: 26.07 KG/M2 | WEIGHT: 166.08 LBS | HEART RATE: 81 BPM | HEIGHT: 67 IN | OXYGEN SATURATION: 99 % | DIASTOLIC BLOOD PRESSURE: 77 MMHG

## 2023-07-17 DIAGNOSIS — N30.00 ACUTE CYSTITIS WITHOUT HEMATURIA: Primary | ICD-10-CM

## 2023-07-17 LAB
ALBUMIN UR-MCNC: 100 MG/DL
APPEARANCE UR: ABNORMAL
BILIRUB UR QL STRIP: NEGATIVE
COLOR UR AUTO: YELLOW
CORTIS SERPL-MCNC: 11.4 UG/DL
CRP SERPL-MCNC: 19.3 MG/L
ERYTHROCYTE [DISTWIDTH] IN BLOOD BY AUTOMATED COUNT: 13.1 % (ref 10–15)
ERYTHROCYTE [SEDIMENTATION RATE] IN BLOOD BY WESTERGREN METHOD: 35 MM/HR (ref 0–30)
FASTING STATUS PATIENT QL REPORTED: NORMAL
GLUCOSE SERPL-MCNC: 95 MG/DL (ref 70–99)
GLUCOSE UR STRIP-MCNC: NEGATIVE MG/DL
HCT VFR BLD AUTO: 38.5 % (ref 35–47)
HGB BLD-MCNC: 13 G/DL (ref 11.7–15.7)
HGB UR QL STRIP: ABNORMAL
KETONES UR STRIP-MCNC: NEGATIVE MG/DL
LEUKOCYTE ESTERASE UR QL STRIP: ABNORMAL
MCH RBC QN AUTO: 32.8 PG (ref 26.5–33)
MCHC RBC AUTO-ENTMCNC: 33.8 G/DL (ref 31.5–36.5)
MCV RBC AUTO: 97 FL (ref 78–100)
NITRATE UR QL: POSITIVE
PH UR STRIP: 8.5 [PH] (ref 5–7)
PLATELET # BLD AUTO: 283 10E3/UL (ref 150–450)
RBC # BLD AUTO: 3.96 10E6/UL (ref 3.8–5.2)
SP GR UR STRIP: 1.02 (ref 1–1.03)
UROBILINOGEN UR STRIP-ACNC: 0.2 E.U./DL
WBC # BLD AUTO: 6.9 10E3/UL (ref 4–11)

## 2023-07-17 PROCEDURE — 82533 TOTAL CORTISOL: CPT | Mod: ORL | Performed by: INTERNAL MEDICINE

## 2023-07-17 PROCEDURE — 82947 ASSAY GLUCOSE BLOOD QUANT: CPT | Mod: ORL | Performed by: INTERNAL MEDICINE

## 2023-07-17 PROCEDURE — 86140 C-REACTIVE PROTEIN: CPT | Mod: ORL | Performed by: INTERNAL MEDICINE

## 2023-07-17 PROCEDURE — 85027 COMPLETE CBC AUTOMATED: CPT | Mod: ORL | Performed by: INTERNAL MEDICINE

## 2023-07-17 PROCEDURE — 87086 URINE CULTURE/COLONY COUNT: CPT | Mod: ORL | Performed by: FAMILY MEDICINE

## 2023-07-17 RX ORDER — CEFPODOXIME PROXETIL 100 MG/1
100 TABLET, FILM COATED ORAL 2 TIMES DAILY
Qty: 14 TABLET | Refills: 0 | Status: SHIPPED | OUTPATIENT
Start: 2023-07-17 | End: 2023-08-03

## 2023-07-17 NOTE — PROGRESS NOTES
ASSESSMENT AND PLAN:     (N30.00) Acute cystitis without hematuria  (primary encounter diagnosis)  Comment: Acute, uncomplicated. No features to suggest systemic infection / pyelonephritis  A little limited with antibiotic options due to interactions with methotrexate.   Will treat with 7 days of 3rd gen oral cephalosporin.   Plan: Urinalysis Macroscopic, cefpodoxime (VANTIN)         100 MG tablet, Urine Culture Aerobic Bacterial          Steven Snyder MD   [facility]  07/17/2023, 9:42 AM      SUBJECTIVE:   Rosa is a 73 year old female who presents to clinic today for a return visit.    - symptoms started about 2 days ago  - urinary urgency  - burning pain with urination  - no blood in urine  - no fevers/chills  - no abdominal pain / flank pain  - no antibiotics for any indication in the past 3 months    - of note is on methotrexate for RA  - last UTI was ~30 years ago    - at physical in June, mentioned she was having intermittent vaginal discharge without itching/pain  - had an unremarkable physical exam, wet prep with yeast and 1+ WBCs/hpf  - had Pap which was normal but showed bacteria consistent with actinomyces  - had copper IUD removed 7/6/23 with ob/gyn  - once Pap results were back, PCP attempted to prescribe 10 day course of PCN VK, which was denied by her pharmacist due to interaction with her methotrexate (increases concentration)  - opted to not treat unless develops other symptoms      Patient Active Problem List   Diagnosis     Varicose veins     Snoring     Primary osteoarthritis of left knee     Multiple joint pain     Dizziness     Elevated blood pressure reading without diagnosis of hypertension     Current Outpatient Medications   Medication     calcium carbonate (OS-LUNA) 500 MG tablet     folic acid (FOLVITE) 1 MG tablet     methotrexate 2.5 MG tablet     Omega-3 Fatty Acids (FISH OIL) 1200 MG capsule     vitamin C (ASCORBIC ACID) 500 MG tablet     vitamin D3  "(CHOLECALCIFEROL) 1000 units (25 mcg) tablet     No current facility-administered medications for this visit.       I have reviewed the patient's relevant past medical history.     OBJECTIVE:   /77   Pulse 81   Temp 97.3  F (36.3  C)   Ht 1.705 m (5' 7.13\")   Wt 75.3 kg (166 lb 1.3 oz)   SpO2 99%   BMI 25.91 kg/m      Constitutional: well-appearing, appears stated age  Eyes: conjunctivae without erythema, sclera anicteric.   Abdomen: no CVA tenderness  Skin: no rashes, lesions, or wounds  Psych: affect is full and appropriate, speech is fluent and non-pressured    Results for orders placed or performed in visit on 07/17/23   Urinalysis Macroscopic     Status: Abnormal   Result Value Ref Range    Color Urine Yellow Colorless, Straw, Light Yellow, Yellow    Appearance Urine Cloudy (A) Clear    Glucose Urine Negative Negative mg/dL    Bilirubin Urine Negative Negative    Ketones Urine Negative Negative mg/dL    Specific Gravity Urine 1.020 1.003 - 1.035    Blood Urine Moderate (A) Negative    pH Urine 8.5 (H) 5.0 - 7.0    Protein Albumin Urine 100 (A) Negative mg/dL    Urobilinogen Urine 0.2 0.2, 1.0 E.U./dL    Nitrite Urine Positive (A) Negative    Leukocyte Esterase Urine Small (A) Negative   CRP inflammation     Status: Abnormal   Result Value Ref Range    CRP Inflammation 19.30 (H) <5.00 mg/L   Glucose     Status: None   Result Value Ref Range    Glucose 95 70 - 99 mg/dL    Patient Fasting > 8hrs? Unknown    CBC with platelets     Status: Normal   Result Value Ref Range    WBC Count 6.9 4.0 - 11.0 10e3/uL    RBC Count 3.96 3.80 - 5.20 10e6/uL    Hemoglobin 13.0 11.7 - 15.7 g/dL    Hematocrit 38.5 35.0 - 47.0 %    MCV 97 78 - 100 fL    MCH 32.8 26.5 - 33.0 pg    MCHC 33.8 31.5 - 36.5 g/dL    RDW 13.1 10.0 - 15.0 %    Platelet Count 283 150 - 450 10e3/uL   Erythrocyte sedimentation rate auto     Status: Abnormal   Result Value Ref Range    Erythrocyte Sedimentation Rate 35 (H) 0 - 30 mm/hr   Cortisol     " Status: None   Result Value Ref Range    Cortisol 11.4   ug/dL

## 2023-07-18 LAB — BACTERIA UR CULT: NORMAL

## 2023-07-18 NOTE — TELEPHONE ENCOUNTER
Medication:    Methotrexate    Last Written Prescription Date:  02/06/2023  Last Fill Quantity: 96,   # refills: 3  Last Office Visit:  02/06/2023  Next Office Visit:  02/05/2024      CBC RESULTS: Recent Labs   Lab Test 07/17/23  1003   WBC 6.9   RBC 3.96   HGB 13.0   HCT 38.5   MCV 97   MCH 32.8   MCHC 33.8   RDW 13.1          Creatinine   Date Value Ref Range Status   12/27/2022 0.51 0.51 - 0.95 mg/dL Final   03/05/2013 0.6 0.6 - 1.3 mg/dL Final   ]    Liver Function Studies -   Recent Labs   Lab Test 12/27/22  1014   ALBUMIN 4.1   AST 18   ALT 14       Provider must authorize refills.    Saloni Villela, KENNETHN, RN  RN Care Coordinator Rheumatology

## 2023-08-02 ENCOUNTER — NURSE TRIAGE (OUTPATIENT)
Dept: NURSING | Facility: CLINIC | Age: 73
End: 2023-08-02
Payer: MEDICARE

## 2023-08-02 NOTE — TELEPHONE ENCOUNTER
Situation: UTI follow up.     Assessment:   Pt was seen in the clinic on 7/17/2023 and was given an antibiotic to take for 7 days for Acute Cystitis. Pt is now having a recurrence of symptoms after being off antibiotics for a couple weeks. Pt reports a burning or tingling when urinating. Pt denies fever, blood in urine, foul smelling urine, or flank pain.     Protocol Recommended Disposition:   See PCP Within 24 hours    Recommendation:   Per protocol, pt should be evaluated within 24 hours in the clinic. Pt was transferred to scheduling to make an appointment. Pt was advised to go to urgent care if she is unable to get an appointment, or she will call the clinic in the morning to see if they can get her in, as scheduling does not schedule for [place].   Pt was advised to: CALL BACK IF: * Fever or back pain occurs * You become worse     Pt verbalized understanding.    Ruiz Vang RN on 8/2/2023 at 5:10 PM    Reason for Disposition   Urinary tract infection suspected, but not taking antibiotics   Age > 50 years    Additional Information   Negative: Shock suspected (e.g., cold/pale/clammy skin, too weak to stand, low BP, rapid pulse)   Negative: Sounds like a life-threatening emergency to the triager   Negative: Shock suspected (e.g., cold/pale/clammy skin, too weak to stand, low BP, rapid pulse)   Negative: Sounds like a life-threatening emergency to the triager   Negative: Followed a genital area injury (e.g., vagina, vulva)   Negative: Taking antibiotic for urinary tract infection (UTI)   Negative: Pregnant   Negative: Postpartum (from 0 to 6 weeks after delivery)   Negative: [1] Unable to urinate (or only a few drops) > 4 hours AND [2] bladder feels very full (e.g., palpable bladder or strong urge to urinate)   Negative: Vomiting   Negative: Patient sounds very sick or weak to the triager   Negative: [1] SEVERE pain with urination (e.g., excruciating) AND [2] not improved after 2 hours of pain medicine and  Sitz bath   Negative: Fever > 100.4 F (38.0 C)   Negative: Side (flank) or lower back pain present   Negative: Diabetes mellitus or weak immune system (e.g., HIV positive, cancer chemo, splenectomy, organ transplant, chronic steroids)   Negative: Bedridden (e.g., nursing home patient, CVA, chronic illness, recovering from surgery)   Negative: Artificial heart valve or artificial joint   Negative: Unusual vaginal discharge (e.g., bad smelling, yellow, green, or foamy-white)   Negative: Patient is worried they have a sexually transmitted infection (STI)   Negative: Possibility of pregnancy   Negative: Blood in urine (red, pink, or tea-colored)    Protocols used: Urinary Tract Infection on Antibiotic Follow-up Call - Female-A-AH, Urination Pain - Female-A-AH

## 2023-08-03 ENCOUNTER — TELEPHONE (OUTPATIENT)
Dept: FAMILY MEDICINE | Facility: CLINIC | Age: 73
End: 2023-08-03

## 2023-08-03 NOTE — TELEPHONE ENCOUNTER
Pt finished antibiotics for UTI. Had what she felt like was UTI Sx yesterday, but today none this far.    She is wondering if she should be concerned?    Krystal

## 2023-08-03 NOTE — TELEPHONE ENCOUNTER
Spoke with Rosa and she had one episode yesterday of chills after voiding.  It only happened once and hasn't recurred.  She's nervous her UTI may not be resolved.  I asked her to continue monitoring her symptoms and call us tomorrow if it occurs again.  She should watch for fever, urgency, pain with urination or after completion.  Also encouraged her to not hold her pee, to void when she first has the sensation she needs to go and not put it off and to stay well hydrated with at least 6-8, eight ounce glasses of water daily.  KENNETH PenaN, RN, CCM  RN Care Coordinator  [facility]  08/03/23  2:00 PM  Phone: [phone]

## 2023-08-04 ENCOUNTER — OFFICE VISIT (OUTPATIENT)
Dept: FAMILY MEDICINE | Facility: CLINIC | Age: 73
End: 2023-08-04
Payer: MEDICARE

## 2023-08-04 VITALS
WEIGHT: 166 LBS | OXYGEN SATURATION: 96 % | BODY MASS INDEX: 26.06 KG/M2 | RESPIRATION RATE: 15 BRPM | HEART RATE: 94 BPM | SYSTOLIC BLOOD PRESSURE: 121 MMHG | HEIGHT: 67 IN | DIASTOLIC BLOOD PRESSURE: 68 MMHG | TEMPERATURE: 98.7 F

## 2023-08-04 DIAGNOSIS — N30.00 ACUTE CYSTITIS WITHOUT HEMATURIA: Primary | ICD-10-CM

## 2023-08-04 DIAGNOSIS — N95.2 ATROPHIC VAGINITIS: ICD-10-CM

## 2023-08-04 DIAGNOSIS — R42 DIZZINESS: ICD-10-CM

## 2023-08-04 LAB
ALBUMIN UR-MCNC: 100 MG/DL
APPEARANCE UR: ABNORMAL
BILIRUB UR QL STRIP: NEGATIVE
COLOR UR AUTO: YELLOW
GLUCOSE UR STRIP-MCNC: NEGATIVE MG/DL
HGB UR QL STRIP: ABNORMAL
KETONES UR STRIP-MCNC: NEGATIVE MG/DL
LEUKOCYTE ESTERASE UR QL STRIP: ABNORMAL
NITRATE UR QL: POSITIVE
PH UR STRIP: 5.5 [PH] (ref 5–7)
SP GR UR STRIP: >=1.03 (ref 1–1.03)
UROBILINOGEN UR STRIP-ACNC: 1 E.U./DL

## 2023-08-04 PROCEDURE — 87086 URINE CULTURE/COLONY COUNT: CPT | Mod: ORL | Performed by: INTERNAL MEDICINE

## 2023-08-04 PROCEDURE — 87186 SC STD MICRODIL/AGAR DIL: CPT | Mod: ORL | Performed by: INTERNAL MEDICINE

## 2023-08-04 RX ORDER — CEFPODOXIME PROXETIL 100 MG/1
100 TABLET, FILM COATED ORAL 2 TIMES DAILY
Qty: 14 TABLET | Refills: 0 | Status: SHIPPED | OUTPATIENT
Start: 2023-08-04 | End: 2023-09-07

## 2023-08-04 NOTE — NURSING NOTE
"73 year old  Chief Complaint   Patient presents with    UTI     UTI sx reoccurred and have been ongoing for the past two days -- burning, frequency, urgency, and pressure.        Blood pressure 121/68, pulse 94, temperature 98.7  F (37.1  C), temperature source Skin, resp. rate 15, height 1.705 m (5' 7.13\"), weight 75.3 kg (166 lb), SpO2 96 %, not currently breastfeeding. Body mass index is 25.9 kg/m .  Patient Active Problem List   Diagnosis    Varicose veins    Snoring    Primary osteoarthritis of left knee    Multiple joint pain    Dizziness    Elevated blood pressure reading without diagnosis of hypertension       Wt Readings from Last 2 Encounters:   08/04/23 75.3 kg (166 lb)   07/17/23 75.3 kg (166 lb 1.3 oz)     BP Readings from Last 3 Encounters:   08/04/23 121/68   07/17/23 126/77   07/06/23 138/67         Current Outpatient Medications   Medication    calcium carbonate (OS-LUNA) 500 MG tablet    folic acid (FOLVITE) 1 MG tablet    methotrexate 2.5 MG tablet    Omega-3 Fatty Acids (FISH OIL) 1200 MG capsule    vitamin C (ASCORBIC ACID) 500 MG tablet    vitamin D3 (CHOLECALCIFEROL) 1000 units (25 mcg) tablet     No current facility-administered medications for this visit.       Social History     Tobacco Use    Smoking status: Never    Smokeless tobacco: Never   Vaping Use    Vaping Use: Never used   Substance Use Topics    Alcohol use: Yes     Comment: 3 per week    Drug use: No       Health Maintenance Due   Topic Date Due    ADVANCE CARE PLANNING  Never done    MAMMO SCREENING  03/14/2019       Lab Results   Component Value Date    PAP NIL 11/28/2018 August 4, 2023 2:05 PM    "

## 2023-08-04 NOTE — PROGRESS NOTES
"Rosa Larson is a 73 year old female with hx of osteoarthritis, dizziness, snoring, varicose veins. She is here for the following issues:    UTI (urinary tract infection)  At her annual wellness visit in 6/2023, Rosa mentioned she was having intermittent vaginal discharge without itching/pain. She had an unremarkable physical exam, wet prep with yeast and 1+ WBCs/hpf. Also had Pap which was normal but showed bacteria consistent with actinomyces. She had her copper IUD removed 7/6/23 with ob/gyn. Once Pap results were back, I attempted to prescribe 10 day course of PCN VK, which was denied by her pharmacist due to interaction with her methotrexate (increases concentration). Rosa opted not to treat unless she developed other symptoms.     Rosa presented to the clinic on 7/17/2023 for a 2 day history of urinary urgency and burning pain with urination. Denied hematuria, fever, chills, abdominal or flank pain. She had not had antibiotics for any indication in the prior 3 months. Was given 7 day course of cefpodoxime 100 mg twice daily.     Today, Rosa reports her dysuria, urinary frequency, and a sensation of pressure have recurred, ongoing for the past 2-3 days. She has been having nocturia 3x/night. She states that her symptoms did initially resolve with the cefpodoxime and she did not have any issues with the medication. She had no symptoms for about a week after finishing the course of cefpodoxime. Her last UTI prior to 7/17 was 30-40 years ago. Denies fever, chills, or hematuria. No diarrhea, constipation, or decreased appetite. No history of kidney stones. She has a male partner and has \"moderate\" sexual activity.     Dizziness  Rosa reported intermittent dizziness when I met with her for her preventive exam in June. Symptoms had been ongoing for 4 months. She will have brief episodes (5 sec) of feeling like she needs to hold on to something or her vision darkening. One episode witnessed by her spouse.  One " "episode happened after a busy Easter morning and another occurred on an especially hot day, but she is not sure if her episodes are always in similar situations. She estimates a total of about 8 episodes of dizziness over the past 4 months, not increasing in frequency. Her dizziness is not triggered by position change.  Denies syncope or falling. No chest pain or palpitations.  I ordered a [employer] monitor which was turned in on 7/19. She has not heard any results.      Rosa recently wore a Zio Patch after a syncopal episode. She reports she turned this in on 7/19, but has not yet gotten the results.      Patient Active Problem List   Diagnosis    Varicose veins    Snoring    Primary osteoarthritis of left knee    Multiple joint pain    Dizziness    Elevated blood pressure reading without diagnosis of hypertension       Current Outpatient Medications   Medication Sig Dispense Refill    calcium carbonate (OS-LUNA) 500 MG tablet Take 1 tablet by mouth 2 times daily      folic acid (FOLVITE) 1 MG tablet Take 1 tablet (1 mg) by mouth daily 90 tablet 3    methotrexate 2.5 MG tablet Take 8 tablets (20 mg) by mouth every 7 days Labs every 8 - 12 weeks for refills. On your methotrexate day, one day per week, take 4 tablets in the morning and 4 tablets in the evening. Take folic acid every day. 96 tablet 1    Omega-3 Fatty Acids (FISH OIL) 1200 MG capsule Take 1 daily 180 capsule 3    vitamin C (ASCORBIC ACID) 500 MG tablet Take one daily 30 tablet 11    vitamin D3 (CHOLECALCIFEROL) 1000 units (25 mcg) tablet Take 1 tablet (1,000 Units) by mouth 30 tablet 11       No Known Allergies     EXAM  /68 (BP Location: Right arm, Patient Position: Sitting, Cuff Size: Adult Large)   Pulse 94   Temp 98.7  F (37.1  C) (Skin)   Resp 15   Ht 1.705 m (5' 7.13\")   Wt 75.3 kg (166 lb)   SpO2 96%   BMI 25.90 kg/m    Gen: Alert, pleasant, NAD  Back: No CVA tenderness  Abdomen: Soft, non tender, normal bowel sounds, no HSM or " mass      Results for orders placed or performed in visit on 08/04/23   Urinalysis Macroscopic     Status: Abnormal   Result Value Ref Range    Color Urine Yellow Colorless, Straw, Light Yellow, Yellow    Appearance Urine Cloudy (A) Clear    Glucose Urine Negative Negative mg/dL    Bilirubin Urine Negative Negative    Ketones Urine Negative Negative mg/dL    Specific Gravity Urine >=1.030 1.003 - 1.035    Blood Urine Moderate (A) Negative    pH Urine 5.5 5.0 - 7.0    Protein Albumin Urine 100 (A) Negative mg/dL    Urobilinogen Urine 1.0 0.2, 1.0 E.U./dL    Nitrite Urine Positive (A) Negative    Leukocyte Esterase Urine Small (A) Negative   Urine Culture Aerobic Bacterial     Status: Abnormal    Specimen: Urine, Clean Catch   Result Value Ref Range    Culture 50,000-100,000 CFU/mL Escherichia coli (A)     Culture <10,000 CFU/mL Urogenital radha        Susceptibility    Escherichia coli - MEMO     Ampicillin >=32 Resistant ug/mL     Ampicillin/ Sulbactam >=32 Resistant ug/mL     Piperacillin/Tazobactam <=4 Susceptible ug/mL     Cefazolin* <=4 Susceptible ug/mL      * Cefazolin MEMO breakpoints are for the treatment of uncomplicated urinary tract infections. For the treatment of systemic infections, please contact the laboratory for additional testing.     Cefoxitin <=4 Susceptible ug/mL     Ceftazidime <=1 Susceptible ug/mL     Ceftriaxone <=1 Susceptible ug/mL     Cefepime <=1 Susceptible ug/mL     Gentamicin <=1 Susceptible ug/mL     Tobramycin <=1 Susceptible ug/mL     Ciprofloxacin 0.5 Intermediate ug/mL     Levofloxacin 1 Intermediate ug/mL     Nitrofurantoin <=16 Susceptible ug/mL     Trimethoprim/Sulfamethoxazole >16/304 Resistant ug/mL     Assessment:  (N30.00) Acute cystitis without hematuria  Comment: Recent treatment for UTI with 7 day course of cefpodoxime, ending 7/24. She now has new onset of symptoms which began 2-3 days ago, suspicious for urinary infection.  Plan: Urinalysis Macroscopic, Urine Culture  Aerobic         Bacterial, cefpodoxime (VANTIN) 100 MG tablet        Treat empirically with Vantin, await culture. Declines Rx for pyridium. Avoid common bladder irritants such as caffeine, spicy or acid rich foods, and alcohol. Ensure good hydration.  Addendum: urine culture returned + for E coli, should be sensitive to the Cefpodoxime. Will check on status via My chart.    (N95.2) Atrophic vaginitis  Comment: Postmenopausal patient with 2 episodes of urinary symptoms in the past month. Suspect this may be triggered by atrophic vaginitis.   Plan: COMPOUNDED NON-CONTROLLED SUBSTANCE (CMPD RX) -        PHARMACY TO MIX COMPOUNDED MEDICATION        Gave Rx for estradiol cream, discussed intravaginal vs external use. Apply a pea-sized amount to dry areas daily x10 days, then reduce to every other day. Discussed potential benefits and side effects of this medication with the patient. Contact MD for any acute concerns/questions.       (R42) Dizziness  Comment: discussion on dizzy, near syncopal episodes discussed at her preventive visit in June 2023. She wore [employer] event monitor and returned it 7/19. Results not yet available  Plan: I will contact cardiology regarding test results.     25 minutes spend on the date of this encounter doing chart review, history and exam, documentation and further activities as noted above.       I have reviewed, edited and approved the above scribed note.    Thi Harvey MD  Internal Medicine/Pediatrics      I, Brigid Lester, am serving as a scribe to document services personally performed by Dr. Thi Harvey, based on data collection and the provider's statements to me.

## 2023-08-04 NOTE — TELEPHONE ENCOUNTER
Pt calling back this morning bc she is having cont UTI Sx.    Wants to know if she can leave a specimen or if she needs to be seen?    Krystal

## 2023-08-06 LAB
BACTERIA UR CULT: ABNORMAL
BACTERIA UR CULT: ABNORMAL

## 2023-08-11 ENCOUNTER — TELEPHONE (OUTPATIENT)
Dept: CARDIOLOGY | Facility: CLINIC | Age: 73
End: 2023-08-11
Payer: MEDICARE

## 2023-08-11 NOTE — TELEPHONE ENCOUNTER
Health Call Center    Phone Message    May a detailed message be left on voicemail: yes     Reason for Call: Appointment Intake    Referring Provider Name: Dr. Thi Harvey  Diagnosis and/or Symptoms: Per referral in 1-2 weeks.     Paroxysmal supraventricular tachycardia (H) [I47.1]; Dizziness [R42]   Patient [place] be [place] [facility] Please assist patient with scheduling a sooner appointment.     Action Taken: Message routed to:  [facility] Card    Travel Screening: Not Applicable

## 2023-08-14 ENCOUNTER — TELEPHONE (OUTPATIENT)
Dept: CARDIOLOGY | Facility: CLINIC | Age: 73
End: 2023-08-14

## 2023-08-18 NOTE — TELEPHONE ENCOUNTER
RECORDS RECEIVED FROM:    DATE RECEIVED:    NOTES STATUS DETAILS   OFFICE NOTE from referring provider  Internal Dr. Harvey 8-11-23 after zio   OFFICE NOTE from other cardiologists  N/A    RECORDS from hospital/ED N/A    MEDICATION LIST Internal    GENERAL CARDIO RECORDS   (ALL APPOINTMENT TYPES)     LABS (CBC,BMP,CMP, TSH) Internal    EKG (STRIPS & REPORTS) N/A    MONITORS (STRIPS & REPORTS) In process Placed 6-   ECHOS (IMAGES AND REPORTS) N/A    STRESS TESTS (IMAGES AND REPORTS) N/A    cMRI (IMAGES AND REPORTS) N/A    CT/CTA (IMAGES AND REPORTS) N/A

## 2023-09-06 NOTE — PROGRESS NOTES
"I am delighted to see Rosa Larson as a new patient in cardiology clinic for evaluation of dizziness.    History of Present Illness:  The patient is a 73 year old  female who reports about 9 months of intermittent dizziness.   She describes two different sensations:  \"Dizzy\", 3-4 seconds at a time, feels like a brief moment of unsteadiness, no spinning sensation, no palpitations, usually when she's been standing for a while or doing something.  \"Black out\", describes as transient feeling of vision blackening, she can hear and knows what's going on, usually happens when she's sitting at a computer.     Both symptoms occur randomly, frequency can be once a week to once every several weeks;  \"dizziness\" episodes more common, the \"black out\" sensations are rarer. No loss of consciousness.    She was given an ambulatory monitor during which episodes of SVT were found so she was referred for further evaluation.    She is otherwise healthy and active, has no physical limitations, although she admits that these dizzy episodes are causing her some anxiety.    Past Medical History:  Rheumatoid arthritis       Medications:   Methotrexate 20 mg every 8 days  Folate  Fish oil  Vitamins       Allergies:  No Known Allergies      Physical examination  Vitals: 127/78, HR 80  BMI= 26  Constitutional: In general, the patient is a pleasant female in no acute distress.    Cardiovascular: Carotids +2/2 bilaterally without bruits.  No jugular venous distension. Regular rate and rhythm. No ectopy. Normal S1, S2. No murmur, rub, click, or gallop.   Extremities: Pulses are normal bilaterally throughout. No peripheral edema.  Respiratory: Clear to asculation.  No ronchi, wheezes, rales.  No dullness to percussion.       I have personally reviewed the following:  Labs:  7/17/2023: hgb 13, plt 283K  6/12/2023: chol 206, HDL 66, , TG 80  12/27/2022: cr 0.51    EKG:   TODAY 9/7/2023: sinus 80 bp, normal intervals    Ambulatory monitor " "6/21/23-7/20/2023:  NSVT 6 beats, no symptoms  Duration 24 beats      Symptoms of \"dizziness\" correlated with SVT, and also with sinus and sinus tachycardia  There were also episodes of SVT without symptoms.    Assessment :  Dizziness, visual black outs, without loss of consciousness.  I reviewed monitor results with her, and she correlated her personal diary with episodes. She did not have any of the visual black outs during the monitoring periods. Her episodes of dizziness was only occasionally correlated with episodes of SVT. Her SVT episodes are brief, and tracings suggests paroxysmal atrial tachycardia. She also had dizziness associated with sinus and sinus tachycardia.    I offered reassurance. Brief episodes of PATs may be related to symptoms, although unclear that these caused all symptoms. Discussed a brief trial of metoprolol to see if symptoms improve. Consider neuorology evaluation for these episodes of visual black outs.      Plan:  Metoprolol succinate 25 at bedtime sent to pharmacy.  She will notify me in a month if there has been any changes to her symptoms.          I spent a total of 30 minutes face to face with  Rosa Larson during today's office visit. I have spend an additional 20 minutes today on chart review and documentation.      The patient is to return as above . The patient understood the treatment plan as outlined above.  There were no barriers to learning.      Jennifer Hinson MD    "

## 2023-09-07 ENCOUNTER — OFFICE VISIT (OUTPATIENT)
Dept: CARDIOLOGY | Facility: CLINIC | Age: 73
End: 2023-09-07
Attending: INTERNAL MEDICINE
Payer: MEDICARE

## 2023-09-07 ENCOUNTER — MYC MEDICAL ADVICE (OUTPATIENT)
Dept: FAMILY MEDICINE | Facility: CLINIC | Age: 73
End: 2023-09-07

## 2023-09-07 ENCOUNTER — PRE VISIT (OUTPATIENT)
Dept: CARDIOLOGY | Facility: CLINIC | Age: 73
End: 2023-09-07
Payer: MEDICARE

## 2023-09-07 VITALS
OXYGEN SATURATION: 99 % | HEART RATE: 80 BPM | WEIGHT: 167.5 LBS | SYSTOLIC BLOOD PRESSURE: 127 MMHG | DIASTOLIC BLOOD PRESSURE: 78 MMHG | BODY MASS INDEX: 26.29 KG/M2 | HEIGHT: 67 IN

## 2023-09-07 DIAGNOSIS — I47.19 PAT (PAROXYSMAL ATRIAL TACHYCARDIA) (H): Primary | ICD-10-CM

## 2023-09-07 DIAGNOSIS — R55 NEAR SYNCOPE: Primary | ICD-10-CM

## 2023-09-07 DIAGNOSIS — R42 DIZZINESS: ICD-10-CM

## 2023-09-07 PROCEDURE — 99204 OFFICE O/P NEW MOD 45 MIN: CPT | Performed by: INTERNAL MEDICINE

## 2023-09-07 PROCEDURE — 93005 ELECTROCARDIOGRAM TRACING: CPT

## 2023-09-07 PROCEDURE — 93010 ELECTROCARDIOGRAM REPORT: CPT | Performed by: INTERNAL MEDICINE

## 2023-09-07 PROCEDURE — G0463 HOSPITAL OUTPT CLINIC VISIT: HCPCS | Performed by: INTERNAL MEDICINE

## 2023-09-07 RX ORDER — METOPROLOL SUCCINATE 25 MG/1
25 TABLET, EXTENDED RELEASE ORAL AT BEDTIME
Qty: 30 TABLET | Refills: 1 | Status: SHIPPED | OUTPATIENT
Start: 2023-09-07 | End: 2023-10-03

## 2023-09-07 ASSESSMENT — PAIN SCALES - GENERAL: PAINLEVEL: NO PAIN (0)

## 2023-09-07 NOTE — LETTER
"9/7/2023      RE: Rosa Larson  [address]  [facility] [address]       Dear Colleague,    Thank you for the opportunity to participate in the care of your patient, Rosa Larson, at the [facility] [place] [facility]. Please see a copy of my visit note below.    I am delighted to see Rosa Larson as a new patient in cardiology clinic for evaluation of dizziness.    History of Present Illness:  The patient is a 73 year old  female who reports about 9 months of intermittent dizziness.   She describes two different sensations:  \"Dizzy\", 3-4 seconds at a time, feels like a brief moment of unsteadiness, no spinning sensation, no palpitations, usually when she's been standing for a while or doing something.  \"Black out\", describes as transient feeling of vision blackening, she can hear and knows what's going on, usually happens when she's sitting at a computer.     Both symptoms occur randomly, frequency can be once a week to once every several weeks;  \"dizziness\" episodes more common, the \"black out\" sensations are rarer. No loss of consciousness.    She was given an ambulatory monitor during which episodes of SVT were found so she was referred for further evaluation.    She is otherwise healthy and active, has no physical limitations, although she admits that these dizzy episodes are causing her some anxiety.    Past Medical History:  Rheumatoid arthritis       Medications:   Methotrexate 20 mg every 8 days  Folate  Fish oil  Vitamins       Allergies:  No Known Allergies      Physical examination  Vitals: 127/78, HR 80  BMI= 26  Constitutional: In general, the patient is a pleasant female in no acute distress.    Cardiovascular: Carotids +2/2 bilaterally without bruits.  No jugular venous distension. Regular rate and rhythm. No ectopy. Normal S1, S2. No murmur, rub, click, or gallop.   Extremities: Pulses are normal bilaterally " "throughout. No peripheral edema.  Respiratory: Clear to asculation.  No ronchi, wheezes, rales.  No dullness to percussion.       I have personally reviewed the following:  Labs:  7/17/2023: hgb 13, plt 283K  6/12/2023: chol 206, HDL 66, , TG 80  12/27/2022: cr 0.51    EKG:   TODAY 9/7/2023: sinus 80 bp, normal intervals    Ambulatory monitor 6/21/23-7/20/2023:  NSVT 6 beats, no symptoms  Duration 24 beats      Symptoms of \"dizziness\" correlated with SVT, and also with sinus and sinus tachycardia  There were also episodes of SVT without symptoms.    Assessment :  Dizziness, visual black outs, without loss of consciousness.  I reviewed monitor results with her, and she correlated her personal diary with episodes. She did not have any of the visual black outs during the monitoring periods. Her episodes of dizziness was only occasionally correlated with episodes of SVT. Her SVT episodes are brief, and tracings suggests paroxysmal atrial tachycardia. She also had dizziness associated with sinus and sinus tachycardia.    I offered reassurance. Brief episodes of PATs may be related to symptoms, although unclear that these caused all symptoms. Discussed a brief trial of metoprolol to see if symptoms improve. Consider neuorology evaluation for these episodes of visual black outs.      Plan:  Metoprolol succinate 25 at bedtime sent to pharmacy.  She will notify me in a month if there has been any changes to her symptoms.          I spent a total of 30 minutes face to face with  Rosaflavia Larson during today's office visit. I have spend an additional 20 minutes today on chart review and documentation.      The patient is to return as above . The patient understood the treatment plan as outlined above.  There were no barriers to learning.      Jennifer Hinson MD        Please do not hesitate to contact me if you have any questions/concerns.     Sincerely,     Jennifre Hinson MD  "

## 2023-09-07 NOTE — NURSING NOTE
Chief Complaint   Patient presents with    New Patient     New EP referral from PCP for PSVT/dizziness per ref- Recent event monitoring revealed paroxysmal SVT in the 200's.       Vitals were taken, medications reconciled and EKG performed.    Michelle Cazares CMA  10:07 AM

## 2023-09-07 NOTE — PATIENT INSTRUCTIONS
You were seen in the Electrophysiology Clinic today by: Dr Jennifer Hinson    Plan:   Medication Changes: try metoprolol succinate 25 mg at bedtime    Labs/Tests Needed: none    Follow up Visit: as needed        If you have further questions, please utilize [facility] to contact us.     Your Care Team:    EP Cardiology   Telephone Number     Nurse Line  Rosa Muñoz, RN   Ashleigh Tello, RN  Rome Altamirano RN   [phone]     For scheduling appointments:   Mila   [phone]   For procedure scheduling:    Carmen Granados     [phone]   For the Device Clinic (Pacemakers, ICDs, Loop Recorders)    During business hours: [phone]  After business hours:   [phone]- select option 4 and ask for job code [id number].       On-call cardiologist for after hours or on weekends:   [phone], option #4, and ask to speak to the on-call cardiologist.     Cardiovascular Clinic:   [address] [place] [address]. [place] [address]      As always, Thank you for trusting us with your health care needs!

## 2023-09-09 LAB
ATRIAL RATE - MUSE: 80 BPM
DIASTOLIC BLOOD PRESSURE - MUSE: NORMAL MMHG
INTERPRETATION ECG - MUSE: NORMAL
P AXIS - MUSE: -13 DEGREES
PR INTERVAL - MUSE: 108 MS
QRS DURATION - MUSE: 82 MS
QT - MUSE: 362 MS
QTC - MUSE: 417 MS
R AXIS - MUSE: 1 DEGREES
SYSTOLIC BLOOD PRESSURE - MUSE: NORMAL MMHG
T AXIS - MUSE: 24 DEGREES
VENTRICULAR RATE- MUSE: 80 BPM

## 2023-09-11 PROBLEM — R55 NEAR SYNCOPE: Status: ACTIVE | Noted: 2023-09-11

## 2023-09-30 DIAGNOSIS — R42 DIZZINESS: ICD-10-CM

## 2023-09-30 DIAGNOSIS — I47.19 PAT (PAROXYSMAL ATRIAL TACHYCARDIA) (H): ICD-10-CM

## 2023-10-03 RX ORDER — METOPROLOL SUCCINATE 25 MG/1
25 TABLET, EXTENDED RELEASE ORAL AT BEDTIME
Qty: 90 TABLET | Refills: 3 | Status: SHIPPED | OUTPATIENT
Start: 2023-10-03 | End: 2024-09-24

## 2023-10-13 ENCOUNTER — LAB (OUTPATIENT)
Dept: LAB | Facility: CLINIC | Age: 73
End: 2023-10-13
Payer: MEDICARE

## 2023-10-13 DIAGNOSIS — Z79.899 HIGH RISK MEDICATION USE: ICD-10-CM

## 2023-10-13 LAB
ALP SERPL-CCNC: 51 U/L (ref 35–104)
ALT SERPL W P-5'-P-CCNC: 13 U/L (ref 0–50)
AST SERPL W P-5'-P-CCNC: 16 U/L (ref 0–45)
BASO+EOS+MONOS # BLD AUTO: NORMAL 10*3/UL
BASO+EOS+MONOS NFR BLD AUTO: NORMAL %
BASOPHILS # BLD AUTO: 0 10E3/UL (ref 0–0.2)
BASOPHILS NFR BLD AUTO: 1 %
CREAT SERPL-MCNC: 0.51 MG/DL (ref 0.51–0.95)
EGFRCR SERPLBLD CKD-EPI 2021: >90 ML/MIN/1.73M2
EOSINOPHIL # BLD AUTO: 0.1 10E3/UL (ref 0–0.7)
EOSINOPHIL NFR BLD AUTO: 2 %
ERYTHROCYTE [DISTWIDTH] IN BLOOD BY AUTOMATED COUNT: 13 % (ref 10–15)
HCT VFR BLD AUTO: 38 % (ref 35–47)
HGB BLD-MCNC: 12.9 G/DL (ref 11.7–15.7)
IMM GRANULOCYTES # BLD: 0 10E3/UL
IMM GRANULOCYTES NFR BLD: 0 %
LYMPHOCYTES # BLD AUTO: 1.9 10E3/UL (ref 0.8–5.3)
LYMPHOCYTES NFR BLD AUTO: 30 %
MCH RBC QN AUTO: 32.4 PG (ref 26.5–33)
MCHC RBC AUTO-ENTMCNC: 33.9 G/DL (ref 31.5–36.5)
MCV RBC AUTO: 96 FL (ref 78–100)
MONOCYTES # BLD AUTO: 0.4 10E3/UL (ref 0–1.3)
MONOCYTES NFR BLD AUTO: 7 %
NEUTROPHILS # BLD AUTO: 3.8 10E3/UL (ref 1.6–8.3)
NEUTROPHILS NFR BLD AUTO: 60 %
NRBC # BLD AUTO: 0 10E3/UL
NRBC BLD AUTO-RTO: 0 /100
PLATELET # BLD AUTO: 263 10E3/UL (ref 150–450)
RBC # BLD AUTO: 3.98 10E6/UL (ref 3.8–5.2)
WBC # BLD AUTO: 6.3 10E3/UL (ref 4–11)

## 2023-10-13 PROCEDURE — 82565 ASSAY OF CREATININE: CPT | Performed by: PATHOLOGY

## 2023-10-13 PROCEDURE — 84450 TRANSFERASE (AST) (SGOT): CPT | Performed by: PATHOLOGY

## 2023-10-13 PROCEDURE — 84460 ALANINE AMINO (ALT) (SGPT): CPT | Performed by: PATHOLOGY

## 2023-10-13 PROCEDURE — 36415 COLL VENOUS BLD VENIPUNCTURE: CPT | Performed by: PATHOLOGY

## 2023-10-13 PROCEDURE — 84075 ASSAY ALKALINE PHOSPHATASE: CPT | Performed by: PATHOLOGY

## 2023-10-13 PROCEDURE — 85025 COMPLETE CBC W/AUTO DIFF WBC: CPT | Performed by: PATHOLOGY

## 2023-10-29 ENCOUNTER — HEALTH MAINTENANCE LETTER (OUTPATIENT)
Age: 73
End: 2023-10-29

## 2023-11-12 NOTE — TELEPHONE ENCOUNTER
RECORDS RECEIVED FROM:    REASON FOR VISIT: Syncope    Date of Appt: 1/10/2024   NOTES (FOR ALL VISITS) STATUS DETAILS   OFFICE NOTE from referring provider SABRA Harvey-8/4/2023   OFFICE NOTE from other specialist SABRA Hinson-9/7/2023   DISCHARGE SUMMARY from hospital     DISCHARGE REPORT from the ER     OPERATIVE REPORT     [facility] (MS ONLY)     EMG     EEG     MEDICATION LIST     IMAGING  (FOR ALL VISITS)     LUMBAR PUNCTURE     ABEBA SCAN (MOVEMENT)     ULTRASOUND (CAROTID BILAT) *VASCULAR*     MRI (HEAD, NECK, SPINE) No Images     CT (HEAD, NECK, SPINE) No Images

## 2023-12-08 ENCOUNTER — TELEPHONE (OUTPATIENT)
Dept: RHEUMATOLOGY | Facility: CLINIC | Age: 73
End: 2023-12-08

## 2023-12-08 NOTE — TELEPHONE ENCOUNTER
Called pt and LVM. Unfortunately Dr. An is leaving [facility] and pt will need to reschedule their appt. If pt calls back, please help them schedule a return pt appt with trell draper at  rheum.

## 2024-01-10 ENCOUNTER — PRE VISIT (OUTPATIENT)
Dept: NEUROLOGY | Facility: CLINIC | Age: 74
End: 2024-01-10

## 2024-01-10 ENCOUNTER — OFFICE VISIT (OUTPATIENT)
Dept: NEUROLOGY | Facility: CLINIC | Age: 74
End: 2024-01-10
Attending: INTERNAL MEDICINE
Payer: MEDICARE

## 2024-01-10 VITALS — SYSTOLIC BLOOD PRESSURE: 128 MMHG | HEART RATE: 81 BPM | OXYGEN SATURATION: 98 % | DIASTOLIC BLOOD PRESSURE: 63 MMHG

## 2024-01-10 DIAGNOSIS — R79.9 ABNORMAL FINDING OF BLOOD CHEMISTRY, UNSPECIFIED: ICD-10-CM

## 2024-01-10 DIAGNOSIS — R93.89 ABNORMAL FINDINGS ON DIAGNOSTIC IMAGING OF OTHER SPECIFIED BODY STRUCTURES: ICD-10-CM

## 2024-01-10 DIAGNOSIS — R55 NEAR SYNCOPE: ICD-10-CM

## 2024-01-10 PROCEDURE — 99204 OFFICE O/P NEW MOD 45 MIN: CPT | Performed by: PSYCHIATRY & NEUROLOGY

## 2024-01-10 ASSESSMENT — PAIN SCALES - GENERAL: PAINLEVEL: NO PAIN (0)

## 2024-01-10 NOTE — PROGRESS NOTES
[facility] Neurology Consultation    Rosa Larson MRN# [id number]   Age: 73 year old YOB: 1950     Requesting physician: Thi Gama     Reason for Consultation: pre-syncope      History of Presenting Symptoms:   Rosa Lrason is a 73 year old female who presents today for evaluation of pre-syncope.  She has a pertinent medical history of rheumatoid arthritis requiring treatment of methotrexate and meloxicam, as well as occasional use of prednisone.    The patient was seen with cardiology providers 9/7/2023 for issues related to dizziness.  Their note was reviewed with pertinent positives below:  - 3-4 seconds of dizziness, transiently occurring, without spinning/palpitations/when standing or doing something.  - Black out feeling described as feeling vision blackening but without LOC and often occurring when at computer.  - Frequency of events is 1/week to 2-3/ a week.  - Ambulatory monitoring led to noted findings of SVT, occasionally linked with episodes of dizziness.  Episodes of visual black-outs was not captured with SVT.  - Started on metoprolol.    Today, the patient describes having a sudden onset of feeling light-headed and feeling faint for 5 seconds. This could happen without exertion while sitting.  These episodes would lead her to at times have her vision feel like it was blurring out from the periphery initial to central.  These episodes have stopped since starting metoprolol.      She has no history of seizure. She has no history of brain trauma, meningitis or encephalitis.  There is no history of febrile seizure.  No family members have consistent seizures, however she does mention that her sister had similar syncope events to her just a year ago (with fainting).  She required no special education in school.  She had a normal development.  There were no major medication changes at the start of her events a year ago, her methotrexate has been stable for  years.  She does take folic acid.      There is no issues of weakness, numbness, diplopia, dysthagia, dysphagia, clumsiness with arms/legs, sensory loss at any point during or outside of her events mentioned above.      Social History:   No alcohol abuse history.  No smoking history.       Medications:     Current Outpatient Medications   Medication    calcium carbonate (OS-LUNA) 500 MG tablet    folic acid (FOLVITE) 1 MG tablet    methotrexate 2.5 MG tablet    metoprolol succinate ER (TOPROL XL) 25 MG 24 hr tablet    Omega-3 Fatty Acids (FISH OIL) 1200 MG capsule    vitamin C (ASCORBIC ACID) 500 MG tablet    vitamin D3 (CHOLECALCIFEROL) 1000 units (25 mcg) tablet      Physical Exam:   Vitals: /63 (BP Location: Right arm, Patient Position: Sitting, Cuff Size: Adult Regular)   Pulse 81   SpO2 98%    General: Seated comfortably in no acute distress.  HEENT: Optic discs sharp and vasculature normal on funduscopic exam.   Neurologic:     Mental Status: Fully alert, attentive and oriented. Speech clear and fluent, no paraphasic errors.      Cranial Nerves: Visual fields intact. PERRL. EOMI with normal smooth pursuit. Facial sensation intact/symmetric. Facial movements symmetric. Hearing not formally tested but intact to conversation. Palate elevation symmetric, uvula midline. No dysarthria. Shoulder shrug strong bilaterally. Tongue protrusion midline.     Motor: No tremors or other abnormal movements observed. Muscle tone normal throughout. No pronator drift. Normal/symmetric rapid finger tapping. Strength 5/5 throughout upper and lower extremities.     Deep Tendon Reflexes: 2+/symmetric throughout upper and lower extremities. No clonus. Toes downgoing bilaterally.     Sensory: Intact/symmetric to light touch, pinprick, vibration and proprioception throughout upper and lower extremities. Negative Romberg.      Coordination: Finger-nose-finger intact without dysmetria. Rapid alternating movements intact/symmetric  with normal speed and rhythm.     Gait: Normal, steady casual gait. Able to walk on toes, heels and tandem without difficulty.         Data: Pertinent prior to visit   Imaging:  None pertinent         Assessment and Plan:   Assessment:  Pre-syncope    The patient's events as reported today don't seem to be  into two separate issues, and instead are an amalgam of concentric vision loss with near LOC and faintness while sitting.  Given her improvement of events with use of metoprolol, and Holter showing some link of her events with atypical heart rates, I do think her symptoms were likely cardiac in origin.  There are no ongoing risk factors for seizure, and given the description of her events (seconds, no confusion, no atypical movements, concentric vision loss, faint feeling) I do not feel they represent focal or generalized seizure.  TIA seems less likely as well, given the lack of focal symptoms or issues/triggers from neck turning or suspected vascular restrictions. At this point, I wouldn't recommend MRI brain or CTA head and neck unless her symptoms return and have additional features.  For now, she could obtain some serum studies to look for common conditions that can exacerbate light-headedness.     Plan:  CBC, iron and iron binding, TSH, B12    Follow up in Neurology clinic should new concerns arise.    ARNOLD Singletary D.O.   of Neurology    Total time today (55 min) in this patient encounter was spent on pre-charting, counseling and/or coordination of care.  The patient is in agreement with this plan and has no further questions.

## 2024-01-10 NOTE — PATIENT INSTRUCTIONS
I suspect your pre-syncope is heart rate related given your improvement on metoprolol. I would like you to obtain some testing to look for conditions that could make you prone to having pre-syncope, but otherwise your exam today was quite reassuring (normal).  If new issues arise, please let me know and I would be happy to see you again.

## 2024-01-10 NOTE — LETTER
1/10/2024       RE: Rosa Larson  [address]  [facility] [address]     Dear Colleague,    Thank you for referring your patient, Rosa Larson, to the [facility] NEUROLOGY CLINIC [place] at [facility]. Please see a copy of my visit note below.    [facility] Neurology Consultation    Rosa Larson MRN# [id number]   Age: 73 year old YOB: 1950     Requesting physician: Thi Gama     Reason for Consultation: pre-syncope      History of Presenting Symptoms:   Rosa Larson is a 73 year old female who presents today for evaluation of pre-syncope.  She has a pertinent medical history of rheumatoid arthritis requiring treatment of methotrexate and meloxicam, as well as occasional use of prednisone.    The patient was seen with cardiology providers 9/7/2023 for issues related to dizziness.  Their note was reviewed with pertinent positives below:  - 3-4 seconds of dizziness, transiently occurring, without spinning/palpitations/when standing or doing something.  - Black out feeling described as feeling vision blackening but without LOC and often occurring when at computer.  - Frequency of events is 1/week to 2-3/ a week.  - Ambulatory monitoring led to noted findings of SVT, occasionally linked with episodes of dizziness.  Episodes of visual black-outs was not captured with SVT.  - Started on metoprolol.    Today, the patient describes having a sudden onset of feeling light-headed and feeling faint for 5 seconds. This could happen without exertion while sitting.  These episodes would lead her to at times have her vision feel like it was blurring out from the periphery initial to central.  These episodes have stopped since starting metoprolol.      She has no history of seizure. She has no history of brain trauma, meningitis or encephalitis.  There is no history of febrile seizure.  No family members have  consistent seizures, however she does mention that her sister had similar syncope events to her just a year ago (with fainting).  She required no special education in school.  She had a normal development.  There were no major medication changes at the start of her events a year ago, her methotrexate has been stable for years.  She does take folic acid.      There is no issues of weakness, numbness, diplopia, dysthagia, dysphagia, clumsiness with arms/legs, sensory loss at any point during or outside of her events mentioned above.      Social History:   No alcohol abuse history.  No smoking history.       Medications:     Current Outpatient Medications   Medication    calcium carbonate (OS-LUNA) 500 MG tablet    folic acid (FOLVITE) 1 MG tablet    methotrexate 2.5 MG tablet    metoprolol succinate ER (TOPROL XL) 25 MG 24 hr tablet    Omega-3 Fatty Acids (FISH OIL) 1200 MG capsule    vitamin C (ASCORBIC ACID) 500 MG tablet    vitamin D3 (CHOLECALCIFEROL) 1000 units (25 mcg) tablet      Physical Exam:   Vitals: /63 (BP Location: Right arm, Patient Position: Sitting, Cuff Size: Adult Regular)   Pulse 81   SpO2 98%    General: Seated comfortably in no acute distress.  HEENT: Optic discs sharp and vasculature normal on funduscopic exam.   Neurologic:     Mental Status: Fully alert, attentive and oriented. Speech clear and fluent, no paraphasic errors.      Cranial Nerves: Visual fields intact. PERRL. EOMI with normal smooth pursuit. Facial sensation intact/symmetric. Facial movements symmetric. Hearing not formally tested but intact to conversation. Palate elevation symmetric, uvula midline. No dysarthria. Shoulder shrug strong bilaterally. Tongue protrusion midline.     Motor: No tremors or other abnormal movements observed. Muscle tone normal throughout. No pronator drift. Normal/symmetric rapid finger tapping. Strength 5/5 throughout upper and lower extremities.     Deep Tendon Reflexes: 2+/symmetric throughout  upper and lower extremities. No clonus. Toes downgoing bilaterally.     Sensory: Intact/symmetric to light touch, pinprick, vibration and proprioception throughout upper and lower extremities. Negative Romberg.      Coordination: Finger-nose-finger intact without dysmetria. Rapid alternating movements intact/symmetric with normal speed and rhythm.     Gait: Normal, steady casual gait. Able to walk on toes, heels and tandem without difficulty.         Data: Pertinent prior to visit   Imaging:  None pertinent         Assessment and Plan:   Assessment:  Pre-syncope    The patient's events as reported today don't seem to be  into two separate issues, and instead are an amalgam of concentric vision loss with near LOC and faintness while sitting.  Given her improvement of events with use of metoprolol, and Holter showing some link of her events with atypical heart rates, I do think her symptoms were likely cardiac in origin.  There are no ongoing risk factors for seizure, and given the description of her events (seconds, no confusion, no atypical movements, concentric vision loss, faint feeling) I do not feel they represent focal or generalized seizure.  TIA seems less likely as well, given the lack of focal symptoms or issues/triggers from neck turning or suspected vascular restrictions. At this point, I wouldn't recommend MRI brain or CTA head and neck unless her symptoms return and have additional features.  For now, she could obtain some serum studies to look for common conditions that can exacerbate light-headedness.     Plan:  CBC, iron and iron binding, TSH, B12    Follow up in Neurology clinic should new concerns arise.      Total time today (55 min) in this patient encounter was spent on pre-charting, counseling and/or coordination of care.  The patient is in agreement with this plan and has no further questions.      Again, thank you for allowing me to participate in the care of your patient.       Sincerely,    Ronak Singletary, DO

## 2024-01-12 ENCOUNTER — LAB (OUTPATIENT)
Dept: LAB | Facility: CLINIC | Age: 74
End: 2024-01-12
Payer: MEDICARE

## 2024-01-12 DIAGNOSIS — R79.9 ABNORMAL FINDING OF BLOOD CHEMISTRY, UNSPECIFIED: ICD-10-CM

## 2024-01-12 DIAGNOSIS — R93.89 ABNORMAL FINDINGS ON DIAGNOSTIC IMAGING OF OTHER SPECIFIED BODY STRUCTURES: ICD-10-CM

## 2024-01-12 DIAGNOSIS — Z79.899 HIGH RISK MEDICATION USE: ICD-10-CM

## 2024-01-12 DIAGNOSIS — R55 NEAR SYNCOPE: ICD-10-CM

## 2024-01-12 LAB
ALP SERPL-CCNC: 45 U/L (ref 40–150)
ALT SERPL W P-5'-P-CCNC: 19 U/L (ref 0–50)
AST SERPL W P-5'-P-CCNC: 20 U/L (ref 0–45)
BASOPHILS # BLD AUTO: 0 10E3/UL (ref 0–0.2)
BASOPHILS NFR BLD AUTO: 1 %
CREAT SERPL-MCNC: 0.54 MG/DL (ref 0.51–0.95)
EGFRCR SERPLBLD CKD-EPI 2021: >90 ML/MIN/1.73M2
EOSINOPHIL # BLD AUTO: 0.1 10E3/UL (ref 0–0.7)
EOSINOPHIL NFR BLD AUTO: 2 %
ERYTHROCYTE [DISTWIDTH] IN BLOOD BY AUTOMATED COUNT: 13.3 % (ref 10–15)
HCT VFR BLD AUTO: 39 % (ref 35–47)
HGB BLD-MCNC: 13.4 G/DL (ref 11.7–15.7)
IMM GRANULOCYTES # BLD: 0 10E3/UL
IMM GRANULOCYTES NFR BLD: 0 %
IRON BINDING CAPACITY (ROCHE): 268 UG/DL (ref 240–430)
IRON SATN MFR SERPL: 57 % (ref 15–46)
IRON SERPL-MCNC: 152 UG/DL (ref 37–145)
LYMPHOCYTES # BLD AUTO: 1.6 10E3/UL (ref 0.8–5.3)
LYMPHOCYTES NFR BLD AUTO: 37 %
MCH RBC QN AUTO: 33.5 PG (ref 26.5–33)
MCHC RBC AUTO-ENTMCNC: 34.4 G/DL (ref 31.5–36.5)
MCV RBC AUTO: 98 FL (ref 78–100)
MONOCYTES # BLD AUTO: 0.4 10E3/UL (ref 0–1.3)
MONOCYTES NFR BLD AUTO: 9 %
NEUTROPHILS # BLD AUTO: 2.2 10E3/UL (ref 1.6–8.3)
NEUTROPHILS NFR BLD AUTO: 51 %
NRBC # BLD AUTO: 0 10E3/UL
NRBC BLD AUTO-RTO: 0 /100
PLATELET # BLD AUTO: 267 10E3/UL (ref 150–450)
RBC # BLD AUTO: 4 10E6/UL (ref 3.8–5.2)
TSH SERPL DL<=0.005 MIU/L-ACNC: 1.78 UIU/ML (ref 0.3–4.2)
VIT B12 SERPL-MCNC: 236 PG/ML (ref 232–1245)
WBC # BLD AUTO: 4.4 10E3/UL (ref 4–11)

## 2024-01-12 PROCEDURE — 82565 ASSAY OF CREATININE: CPT | Performed by: PATHOLOGY

## 2024-01-12 PROCEDURE — 84443 ASSAY THYROID STIM HORMONE: CPT | Performed by: PATHOLOGY

## 2024-01-12 PROCEDURE — 84075 ASSAY ALKALINE PHOSPHATASE: CPT | Performed by: PATHOLOGY

## 2024-01-12 PROCEDURE — 36415 COLL VENOUS BLD VENIPUNCTURE: CPT | Performed by: PATHOLOGY

## 2024-01-12 PROCEDURE — 83550 IRON BINDING TEST: CPT | Performed by: PATHOLOGY

## 2024-01-12 PROCEDURE — 82607 VITAMIN B-12: CPT | Performed by: PSYCHIATRY & NEUROLOGY

## 2024-01-12 PROCEDURE — 84460 ALANINE AMINO (ALT) (SGPT): CPT | Performed by: PATHOLOGY

## 2024-01-12 PROCEDURE — 83540 ASSAY OF IRON: CPT | Performed by: PATHOLOGY

## 2024-01-12 PROCEDURE — 85025 COMPLETE CBC W/AUTO DIFF WBC: CPT | Performed by: PATHOLOGY

## 2024-01-12 PROCEDURE — 99000 SPECIMEN HANDLING OFFICE-LAB: CPT | Performed by: PATHOLOGY

## 2024-01-12 PROCEDURE — 84450 TRANSFERASE (AST) (SGOT): CPT | Performed by: PATHOLOGY

## 2024-01-17 ENCOUNTER — MYC MEDICAL ADVICE (OUTPATIENT)
Dept: RHEUMATOLOGY | Facility: CLINIC | Age: 74
End: 2024-01-17
Payer: MEDICARE

## 2024-01-17 DIAGNOSIS — M05.741 RHEUMATOID ARTHRITIS INVOLVING BOTH HANDS WITH POSITIVE RHEUMATOID FACTOR (H): ICD-10-CM

## 2024-01-17 DIAGNOSIS — M05.742 RHEUMATOID ARTHRITIS INVOLVING BOTH HANDS WITH POSITIVE RHEUMATOID FACTOR (H): ICD-10-CM

## 2024-01-17 RX ORDER — FOLIC ACID 1 MG/1
1 TABLET ORAL DAILY
Qty: 90 TABLET | Refills: 1 | Status: SHIPPED | OUTPATIENT
Start: 2024-01-17 | End: 2024-02-06

## 2024-01-17 NOTE — TELEPHONE ENCOUNTER
folic acid (FOLVITE) 1 MG tablet   Last Written Prescription Date:  2/6/2023-2/6/2024  Last Fill Quantity: 90,   # refills: 3  Last Office Visit : 2/6/2023  Future Office visit:  2/6/2024    Routing refill request to provider for review/approval because:  Dr. Lou is gone.  Pt has visit with new provider begin of Feb 2024.  Sending to On call Provider for refill to cover Pt until visit in Feb 2024.    Roxane Deutsch RN  Central Triage Red Flags/Med Refills

## 2024-02-05 NOTE — PROGRESS NOTES
RHEUMATOLOGY OUTPATIENT CLINIC NOTE     Referring Provider: Cayetano An MD, PhD     Rheumatology history   Rosa was first evaluated in rheumatology for rheumatoid arthritis.  Onset 2021, hands, clinically carpal tunnel syndrome.  Nonerosive x-ray, rheumatoid factor/CCP antibody positive.  Treated with methotrexate and meloxicam as needed.    Review of Labs     2021  CCP antibody more than 340  Rheumatoid factor 340    SSA/SSB: Negative  dsDNA: Negative  JOHANNE panel: Negative  Complement C3/C4: Negative    Review of imaging   Hand x-rays, 11/2021: Nonerosive, degenerative changes    Subjective     Current visit February 5, 2024  Rosa Larson is a 73 year old female who presents today for follow up. She has last seen Dr. Lou in 2/2023.   She has not a flare since her last visit. No recent infection over the past few months.     Review of Systems   Constitutional:  Negative for fever and weight loss.   Respiratory:  Negative for cough and shortness of breath.    Musculoskeletal:  Negative for joint pain.        Joint swelling: None visible  Morning stiffness lasts few minutes   Skin:  Negative for rash.       Current Medications   Methotrexate 20 mg weekly (split dose, Thursdays), folic acid 1 mg daily    Objective   /61 (BP Location: Left arm, Patient Position: Sitting, Cuff Size: Adult Large)   Pulse 86   SpO2 97%       PHYSICAL EXAMINATION  Physical Exam  HENT:      Head: Normocephalic.      Mouth/Throat:      Mouth: Mucous membranes are moist.   Eyes:      Conjunctiva/sclera: Conjunctivae normal.   Cardiovascular:      Pulses: Normal pulses.      Heart sounds: Normal heart sounds.   Pulmonary:      Effort: Pulmonary effort is normal.      Breath sounds: Normal breath sounds.   Abdominal:      Palpations: Abdomen is soft.   Musculoskeletal:         General: No swelling or tenderness.      Comments: Synovial thickening in several MCP joints bilaterally without tenderness.  No evidence of synovitis  in the wrists, elbows, knees, feet.        SJC:0/28  TJC: 0/28   Skin:     Findings: No rash.   Neurological:      Mental Status: She is alert.     Joint pain: 0/10    Assessment & Plan     Rosa is following with rheumatology for rheumatoid factor, CCP positive, rheumatoid arthritis.  Onset 2021.  X-ray reported as nonerosive, treated with methotrexate monotherapy.    # Seropositive rheumatoid arthritis [rheumatoid factor, CCP positive]  # Vitamin D deficiency  # Low normal vitamin B12, questionable deficiency  # Screening for chronic infections  # High risk medication    Seropositive rheumatoid arthritis [rheumatoid factor, CCP positive]  She is doing okay from rheumatoid standpoint. CDAI score is 0.  Tolerating methotrexate well.  Plan:   1-continue methotrexate 20 mg weekly, split dosing  2-continue folic acid 1 mg daily supplementation.  3-check x-ray hands for interval change.  If there is radiographic progression then may increase methotrexate to 25 mg weekly split dose orally or subcutaneous injection.    Vitamin D deficiency  Vitamin D deficiency, last checked 2 years ago was 18 ng, currently on supplementation.  Plan:  1- recheck vitamin D level   2- continue supplementation for now    Low normal vitamin B12, questionable deficiency  Low normal vitamin B12, recommend supplementation    Screening for chronic infections   2021  Hep B surface antibody: Negative, hep B core antibody: Negative  Hep C antibody: Negative  QuantiFERON gold: Negative  Plan:  Order Hepatitis B Surface antigen    High risk medication  Methotrexate   Labs: Reviewed CBC, AST, creatinine in January 2024: No signs of toxicity  Plan:  Standing orders: CBC, AST, creatinine every 3 months      40 minutes spent by me on the date of the encounter doing chart review, history and exam, documentation and further activities per the note      Return in about 6 months (around 8/6/2024) for Follow up.    Carolina Meza MD  [facility]  [facility] RHEUMATOLOGY

## 2024-02-06 ENCOUNTER — OFFICE VISIT (OUTPATIENT)
Dept: RHEUMATOLOGY | Facility: CLINIC | Age: 74
End: 2024-02-06
Attending: STUDENT IN AN ORGANIZED HEALTH CARE EDUCATION/TRAINING PROGRAM
Payer: MEDICARE

## 2024-02-06 VITALS — HEART RATE: 86 BPM | OXYGEN SATURATION: 97 % | DIASTOLIC BLOOD PRESSURE: 61 MMHG | SYSTOLIC BLOOD PRESSURE: 121 MMHG

## 2024-02-06 DIAGNOSIS — E55.9 VITAMIN D DEFICIENCY: ICD-10-CM

## 2024-02-06 DIAGNOSIS — Z11.59 ENCOUNTER FOR SCREENING FOR OTHER VIRAL DISEASES: ICD-10-CM

## 2024-02-06 DIAGNOSIS — M05.742 RHEUMATOID ARTHRITIS INVOLVING BOTH HANDS WITH POSITIVE RHEUMATOID FACTOR (H): Primary | ICD-10-CM

## 2024-02-06 DIAGNOSIS — M05.741 RHEUMATOID ARTHRITIS INVOLVING BOTH HANDS WITH POSITIVE RHEUMATOID FACTOR (H): Primary | ICD-10-CM

## 2024-02-06 DIAGNOSIS — E53.8 VITAMIN B12 DEFICIENCY DISEASE: ICD-10-CM

## 2024-02-06 DIAGNOSIS — Z79.899 HIGH RISK MEDICATION USE: ICD-10-CM

## 2024-02-06 PROCEDURE — G0463 HOSPITAL OUTPT CLINIC VISIT: HCPCS | Performed by: STUDENT IN AN ORGANIZED HEALTH CARE EDUCATION/TRAINING PROGRAM

## 2024-02-06 PROCEDURE — 99215 OFFICE O/P EST HI 40 MIN: CPT | Performed by: STUDENT IN AN ORGANIZED HEALTH CARE EDUCATION/TRAINING PROGRAM

## 2024-02-06 RX ORDER — FOLIC ACID 1 MG/1
1 TABLET ORAL DAILY
Qty: 90 TABLET | Refills: 1 | Status: SHIPPED | OUTPATIENT
Start: 2024-02-06 | End: 2024-08-07

## 2024-02-06 RX ORDER — METHOTREXATE 2.5 MG/1
20 TABLET ORAL
Qty: 96 TABLET | Refills: 1 | Status: SHIPPED | OUTPATIENT
Start: 2024-02-06 | End: 2024-02-26

## 2024-02-06 RX ORDER — LANOLIN ALCOHOL/MO/W.PET/CERES
2000 CREAM (GRAM) TOPICAL DAILY
Qty: 360 TABLET | Refills: 0 | Status: SHIPPED | OUTPATIENT
Start: 2024-02-06 | End: 2024-07-24

## 2024-02-06 ASSESSMENT — ENCOUNTER SYMPTOMS
SHORTNESS OF BREATH: 0
WEIGHT LOSS: 0
FEVER: 0
COUGH: 0

## 2024-02-06 ASSESSMENT — PAIN SCALES - GENERAL: PAINLEVEL: SEVERE PAIN (6)

## 2024-02-06 NOTE — LETTER
2/6/2024       RE: Rosa Larson  [address]  [place] [address]     Dear Colleague,    Thank you for referring your patient, Rosa Larson, to the [facility] RHEUMATOLOGY at [facility]. Please see a copy of my visit note below.    RHEUMATOLOGY OUTPATIENT CLINIC NOTE     Referring Provider: Cayetano An MD, PhD     Rheumatology history   Rosa was first evaluated in rheumatology for rheumatoid arthritis.  Onset 2021, hands, clinically carpal tunnel syndrome.  Nonerosive x-ray, rheumatoid factor/CCP antibody positive.  Treated with methotrexate and meloxicam as needed.    Review of Labs     2021  CCP antibody more than 340  Rheumatoid factor 340    SSA/SSB: Negative  dsDNA: Negative  JOHANNE panel: Negative  Complement C3/C4: Negative    Review of imaging   Hand x-rays, 11/2021: Nonerosive, degenerative changes    Subjective    Current visit February 5, 2024  Rosa Larson is a 73 year old female who presents today for follow up. She has last seen Dr. Lou in 2/2023.   She has not a flare since her last visit. No recent infection over the past few months.     Review of Systems   Constitutional:  Negative for fever and weight loss.   Respiratory:  Negative for cough and shortness of breath.    Musculoskeletal:  Negative for joint pain.        Joint swelling: None visible  Morning stiffness lasts few minutes   Skin:  Negative for rash.       Current Medications   Methotrexate 20 mg weekly (split dose, Thursdays), folic acid 1 mg daily    Objective  /61 (BP Location: Left arm, Patient Position: Sitting, Cuff Size: Adult Large)   Pulse 86   SpO2 97%       PHYSICAL EXAMINATION  Physical Exam  HENT:      Head: Normocephalic.      Mouth/Throat:      Mouth: Mucous membranes are moist.   Eyes:      Conjunctiva/sclera: Conjunctivae normal.   Cardiovascular:      Pulses: Normal pulses.      Heart sounds: Normal heart sounds.   Pulmonary:       Effort: Pulmonary effort is normal.      Breath sounds: Normal breath sounds.   Abdominal:      Palpations: Abdomen is soft.   Musculoskeletal:         General: No swelling or tenderness.      Comments: Synovial thickening in several MCP joints bilaterally without tenderness.  No evidence of synovitis in the wrists, elbows, knees, feet.    SJC:0/28  TJC: 0/28   Skin:     Findings: No rash.   Neurological:      Mental Status: She is alert.     Joint pain: 0/10    Assessment & Plan    Rosa is following with rheumatology for rheumatoid factor, CCP positive, rheumatoid arthritis.  Onset 2021.  X-ray reported as nonerosive, treated with methotrexate monotherapy.    # Seropositive rheumatoid arthritis [rheumatoid factor, CCP positive]  # Vitamin D deficiency  # Low normal vitamin B12, questionable deficiency  # Screening for chronic infections  # High risk medication    Seropositive rheumatoid arthritis [rheumatoid factor, CCP positive]  She is doing okay from rheumatoid standpoint. CDAI score is 0.  Tolerating methotrexate well.  Plan:   1-continue methotrexate 20 mg weekly, split dosing  2-continue folic acid 1 mg daily supplementation.  3-check x-ray hands for interval change.  If there is radiographic progression then may increase methotrexate to 25 mg weekly split dose orally or subcutaneous injection.    Vitamin D deficiency  Vitamin D deficiency, last checked 2 years ago was 18 ng, currently on supplementation.  Plan:  1- recheck vitamin D level   2- continue supplementation for now    Low normal vitamin B12, questionable deficiency  Low normal vitamin B12, recommend supplementation    Screening for chronic infections   2021  Hep B surface antibody: Negative, hep B core antibody: Negative  Hep C antibody: Negative  QuantiFERON gold: Negative  Plan:  Order Hepatitis B Surface antigen    High risk medication  Methotrexate   Labs: Reviewed CBC, AST, creatinine in January 2024: No signs of toxicity  Plan:  Standing  orders: CBC, AST, creatinine every 3 months      40 minutes spent by me on the date of the encounter doing chart review, history and exam, documentation and further activities per the note      Return in about 6 months (around 8/6/2024) for Follow up.    Carolina Meza MD  [facility] RHEUMATOLOGY

## 2024-02-06 NOTE — NURSING NOTE
Chief Complaint   Patient presents with    RECHECK     /61 (BP Location: Left arm, Patient Position: Sitting, Cuff Size: Adult Large)   Pulse 86   SpO2 97%

## 2024-02-14 ENCOUNTER — LAB (OUTPATIENT)
Dept: LAB | Facility: CLINIC | Age: 74
End: 2024-02-14
Payer: MEDICARE

## 2024-02-14 ENCOUNTER — ANCILLARY PROCEDURE (OUTPATIENT)
Dept: GENERAL RADIOLOGY | Facility: CLINIC | Age: 74
End: 2024-02-14
Attending: STUDENT IN AN ORGANIZED HEALTH CARE EDUCATION/TRAINING PROGRAM
Payer: MEDICARE

## 2024-02-14 DIAGNOSIS — Z79.899 HIGH RISK MEDICATION USE: ICD-10-CM

## 2024-02-14 DIAGNOSIS — Z11.59 ENCOUNTER FOR SCREENING FOR OTHER VIRAL DISEASES: ICD-10-CM

## 2024-02-14 DIAGNOSIS — M05.741 RHEUMATOID ARTHRITIS INVOLVING BOTH HANDS WITH POSITIVE RHEUMATOID FACTOR (H): ICD-10-CM

## 2024-02-14 DIAGNOSIS — E55.9 VITAMIN D DEFICIENCY: ICD-10-CM

## 2024-02-14 DIAGNOSIS — M05.742 RHEUMATOID ARTHRITIS INVOLVING BOTH HANDS WITH POSITIVE RHEUMATOID FACTOR (H): ICD-10-CM

## 2024-02-14 LAB
HBV SURFACE AG SERPL QL IA: NONREACTIVE
VIT D+METAB SERPL-MCNC: 24 NG/ML (ref 20–50)

## 2024-02-14 PROCEDURE — 36415 COLL VENOUS BLD VENIPUNCTURE: CPT | Performed by: PATHOLOGY

## 2024-02-14 PROCEDURE — 99000 SPECIMEN HANDLING OFFICE-LAB: CPT | Performed by: PATHOLOGY

## 2024-02-14 PROCEDURE — 82306 VITAMIN D 25 HYDROXY: CPT | Performed by: STUDENT IN AN ORGANIZED HEALTH CARE EDUCATION/TRAINING PROGRAM

## 2024-02-14 PROCEDURE — 87340 HEPATITIS B SURFACE AG IA: CPT | Performed by: STUDENT IN AN ORGANIZED HEALTH CARE EDUCATION/TRAINING PROGRAM

## 2024-02-14 PROCEDURE — 73130 X-RAY EXAM OF HAND: CPT | Mod: LT | Performed by: RADIOLOGY

## 2024-02-16 NOTE — RESULT ENCOUNTER NOTE
Hand X-rays showed interval progression of radiographic findings despite good clinical control of RA.  Recommend increase methotrexate to 25 mg weekly split dosing and assess response.  Follow up in 3 months recommended to assess response    Carolina Meza MD

## 2024-02-26 ENCOUNTER — TELEPHONE (OUTPATIENT)
Dept: RHEUMATOLOGY | Facility: CLINIC | Age: 74
End: 2024-02-26
Payer: MEDICARE

## 2024-02-26 DIAGNOSIS — M05.741 RHEUMATOID ARTHRITIS INVOLVING BOTH HANDS WITH POSITIVE RHEUMATOID FACTOR (H): ICD-10-CM

## 2024-02-26 DIAGNOSIS — M05.742 RHEUMATOID ARTHRITIS INVOLVING BOTH HANDS WITH POSITIVE RHEUMATOID FACTOR (H): ICD-10-CM

## 2024-02-26 DIAGNOSIS — Z79.899 HIGH RISK MEDICATION USE: ICD-10-CM

## 2024-02-26 RX ORDER — METHOTREXATE 2.5 MG/1
25 TABLET ORAL
Qty: 96 TABLET | Refills: 1 | Status: SHIPPED | OUTPATIENT
Start: 2024-02-26 | End: 2024-08-15

## 2024-02-26 NOTE — TELEPHONE ENCOUNTER
Called pt and relayed a message from provider attached below.    Vitamin D is low normal. If she supplementing around 1000 international unit(s) then increase to 2000 international unit(s) daily.     Pt verbalized understanding. Pt also had another request to provider. Message routed to provider.    Nicole Diane RN  [facility]

## 2024-06-07 ENCOUNTER — LAB (OUTPATIENT)
Dept: LAB | Facility: CLINIC | Age: 74
End: 2024-06-07
Attending: STUDENT IN AN ORGANIZED HEALTH CARE EDUCATION/TRAINING PROGRAM
Payer: MEDICARE

## 2024-06-07 DIAGNOSIS — M05.741 RHEUMATOID ARTHRITIS INVOLVING BOTH HANDS WITH POSITIVE RHEUMATOID FACTOR (H): ICD-10-CM

## 2024-06-07 DIAGNOSIS — M05.742 RHEUMATOID ARTHRITIS INVOLVING BOTH HANDS WITH POSITIVE RHEUMATOID FACTOR (H): ICD-10-CM

## 2024-06-07 LAB
AST SERPL W P-5'-P-CCNC: 18 U/L (ref 0–45)
BASOPHILS # BLD AUTO: 0.1 10E3/UL (ref 0–0.2)
BASOPHILS NFR BLD AUTO: 1 %
CREAT SERPL-MCNC: 0.54 MG/DL (ref 0.51–0.95)
CRP SERPL-MCNC: 3.96 MG/L
EGFRCR SERPLBLD CKD-EPI 2021: >90 ML/MIN/1.73M2
EOSINOPHIL # BLD AUTO: 0.1 10E3/UL (ref 0–0.7)
EOSINOPHIL NFR BLD AUTO: 2 %
ERYTHROCYTE [DISTWIDTH] IN BLOOD BY AUTOMATED COUNT: 13 % (ref 10–15)
ERYTHROCYTE [SEDIMENTATION RATE] IN BLOOD BY WESTERGREN METHOD: 30 MM/HR (ref 0–30)
HCT VFR BLD AUTO: 37 % (ref 35–47)
HGB BLD-MCNC: 12.7 G/DL (ref 11.7–15.7)
IMM GRANULOCYTES # BLD: 0 10E3/UL
IMM GRANULOCYTES NFR BLD: 0 %
LYMPHOCYTES # BLD AUTO: 1.9 10E3/UL (ref 0.8–5.3)
LYMPHOCYTES NFR BLD AUTO: 27 %
MCH RBC QN AUTO: 33.1 PG (ref 26.5–33)
MCHC RBC AUTO-ENTMCNC: 34.3 G/DL (ref 31.5–36.5)
MCV RBC AUTO: 96 FL (ref 78–100)
MONOCYTES # BLD AUTO: 0.5 10E3/UL (ref 0–1.3)
MONOCYTES NFR BLD AUTO: 7 %
NEUTROPHILS # BLD AUTO: 4.5 10E3/UL (ref 1.6–8.3)
NEUTROPHILS NFR BLD AUTO: 63 %
NRBC # BLD AUTO: 0 10E3/UL
NRBC BLD AUTO-RTO: 0 /100
PLATELET # BLD AUTO: 254 10E3/UL (ref 150–450)
RBC # BLD AUTO: 3.84 10E6/UL (ref 3.8–5.2)
WBC # BLD AUTO: 7 10E3/UL (ref 4–11)

## 2024-06-07 PROCEDURE — 86140 C-REACTIVE PROTEIN: CPT | Performed by: PATHOLOGY

## 2024-06-07 PROCEDURE — 36415 COLL VENOUS BLD VENIPUNCTURE: CPT | Performed by: PATHOLOGY

## 2024-06-07 PROCEDURE — 82565 ASSAY OF CREATININE: CPT | Performed by: PATHOLOGY

## 2024-06-07 PROCEDURE — 85025 COMPLETE CBC W/AUTO DIFF WBC: CPT | Performed by: PATHOLOGY

## 2024-06-07 PROCEDURE — 84450 TRANSFERASE (AST) (SGOT): CPT | Performed by: PATHOLOGY

## 2024-06-07 PROCEDURE — 85652 RBC SED RATE AUTOMATED: CPT | Performed by: PATHOLOGY

## 2024-07-24 DIAGNOSIS — E53.8 VITAMIN B12 DEFICIENCY DISEASE: ICD-10-CM

## 2024-07-29 NOTE — TELEPHONE ENCOUNTER
Medication Requested:  cyanocobalamin (VITAMIN B-12) 1000 MCG tablet 360 tablet 0 2/6/2024 8/4/2024 No   Sig - Route: Take 2 tablets (2,000 mcg) by mouth daily for 180 days - Oral     ----------------------  Last Office Visit : 2/6/2024  [facility] Rheumatology      Future Office visit:     8/6/2024 8:30 AM (30 min)  [facility]   Arrive by:  8:15 AM   RETURN   [place] ([place])   Carolina Meza MD     ----------------------    Refill decision: Refill pended and routed to the provider for review/determination due to the following criteria not met: Medication not on Rheum refill protocol, Last script limited to 180 days.    Medication unable to be refilled by RN due to criteria not met as indicated.                 []Supervision - Pt not seen within past 12 months, no future appointment              []Compliance - lapse in therapy/gap in refills              []Verification - order discrepancy, clarification needed              []Controlled medication              [x]Medication not on MH, UMP, FMG refill protocol   [] Abnormal labs/test:  [] Overdue labs/test:    []Eleanor refill given x1, Pt did not follow-up, pended to care team for decision  [] Medication not active on Pt's med list  [] Drug interaction Warning  [] Medication is Reported/Historical              []Other:     Pass/Fail Protocol Criteria:  Rheumatology includes ONLY:    -Vitamin D 20,000 or less (w or w/o calcium) * May refill 50,000 if given no more than 1 time per month  -Folic Acid

## 2024-07-30 RX ORDER — LANOLIN ALCOHOL/MO/W.PET/CERES
2000 CREAM (GRAM) TOPICAL DAILY
Qty: 360 TABLET | Refills: 0 | Status: SHIPPED | OUTPATIENT
Start: 2024-07-30 | End: 2024-08-15

## 2024-08-02 ENCOUNTER — LAB (OUTPATIENT)
Dept: LAB | Facility: CLINIC | Age: 74
End: 2024-08-02
Payer: MEDICARE

## 2024-08-02 DIAGNOSIS — M05.741 RHEUMATOID ARTHRITIS INVOLVING BOTH HANDS WITH POSITIVE RHEUMATOID FACTOR (H): ICD-10-CM

## 2024-08-02 DIAGNOSIS — M05.742 RHEUMATOID ARTHRITIS INVOLVING BOTH HANDS WITH POSITIVE RHEUMATOID FACTOR (H): ICD-10-CM

## 2024-08-02 LAB
AST SERPL W P-5'-P-CCNC: 18 U/L (ref 0–45)
BASOPHILS # BLD AUTO: 0.1 10E3/UL (ref 0–0.2)
BASOPHILS NFR BLD AUTO: 1 %
CREAT SERPL-MCNC: 0.51 MG/DL (ref 0.51–0.95)
CRP SERPL-MCNC: 3.66 MG/L
EGFRCR SERPLBLD CKD-EPI 2021: >90 ML/MIN/1.73M2
EOSINOPHIL # BLD AUTO: 0.1 10E3/UL (ref 0–0.7)
EOSINOPHIL NFR BLD AUTO: 3 %
ERYTHROCYTE [DISTWIDTH] IN BLOOD BY AUTOMATED COUNT: 13 % (ref 10–15)
ERYTHROCYTE [SEDIMENTATION RATE] IN BLOOD BY WESTERGREN METHOD: 22 MM/HR (ref 0–30)
HCT VFR BLD AUTO: 38.2 % (ref 35–47)
HGB BLD-MCNC: 13.1 G/DL (ref 11.7–15.7)
IMM GRANULOCYTES # BLD: 0 10E3/UL
IMM GRANULOCYTES NFR BLD: 0 %
LYMPHOCYTES # BLD AUTO: 1.6 10E3/UL (ref 0.8–5.3)
LYMPHOCYTES NFR BLD AUTO: 36 %
MCH RBC QN AUTO: 33 PG (ref 26.5–33)
MCHC RBC AUTO-ENTMCNC: 34.3 G/DL (ref 31.5–36.5)
MCV RBC AUTO: 96 FL (ref 78–100)
MONOCYTES # BLD AUTO: 0.4 10E3/UL (ref 0–1.3)
MONOCYTES NFR BLD AUTO: 8 %
NEUTROPHILS # BLD AUTO: 2.4 10E3/UL (ref 1.6–8.3)
NEUTROPHILS NFR BLD AUTO: 52 %
NRBC # BLD AUTO: 0 10E3/UL
NRBC BLD AUTO-RTO: 0 /100
PLATELET # BLD AUTO: 246 10E3/UL (ref 150–450)
RBC # BLD AUTO: 3.97 10E6/UL (ref 3.8–5.2)
WBC # BLD AUTO: 4.5 10E3/UL (ref 4–11)

## 2024-08-02 PROCEDURE — 84450 TRANSFERASE (AST) (SGOT): CPT | Performed by: PATHOLOGY

## 2024-08-02 PROCEDURE — 36415 COLL VENOUS BLD VENIPUNCTURE: CPT | Performed by: PATHOLOGY

## 2024-08-02 PROCEDURE — 82565 ASSAY OF CREATININE: CPT | Performed by: PATHOLOGY

## 2024-08-02 PROCEDURE — 85025 COMPLETE CBC W/AUTO DIFF WBC: CPT | Performed by: PATHOLOGY

## 2024-08-02 PROCEDURE — 86140 C-REACTIVE PROTEIN: CPT | Performed by: PATHOLOGY

## 2024-08-02 PROCEDURE — 85652 RBC SED RATE AUTOMATED: CPT | Performed by: PATHOLOGY

## 2024-08-04 ENCOUNTER — HEALTH MAINTENANCE LETTER (OUTPATIENT)
Age: 74
End: 2024-08-04

## 2024-08-05 ENCOUNTER — TELEPHONE (OUTPATIENT)
Dept: RHEUMATOLOGY | Facility: CLINIC | Age: 74
End: 2024-08-05

## 2024-08-05 NOTE — TELEPHONE ENCOUNTER
Called pt and LVM regarding appt on 8/6/24. Dr. Meza is unavailable this day and will need to reschedule their appt. If pt calls back please help them reschedule for a return pt appt.

## 2024-08-07 ENCOUNTER — MYC MEDICAL ADVICE (OUTPATIENT)
Dept: RHEUMATOLOGY | Facility: CLINIC | Age: 74
End: 2024-08-07
Payer: MEDICARE

## 2024-08-07 DIAGNOSIS — M05.741 RHEUMATOID ARTHRITIS INVOLVING BOTH HANDS WITH POSITIVE RHEUMATOID FACTOR (H): ICD-10-CM

## 2024-08-07 DIAGNOSIS — M05.742 RHEUMATOID ARTHRITIS INVOLVING BOTH HANDS WITH POSITIVE RHEUMATOID FACTOR (H): ICD-10-CM

## 2024-08-07 NOTE — TELEPHONE ENCOUNTER
folic acid (FOLVITE) 1 MG tablet       Last Written Prescription Date:  2-6-24  Last Fill Quantity: 90,   # refills: 1  Last Office Visit : 2-26-24  Future Office visit:  8-15-24    Routing refill request to provider for review/approval because:  Failed protocol :diagnosis

## 2024-08-08 RX ORDER — FOLIC ACID 1 MG/1
1 TABLET ORAL DAILY
Qty: 90 TABLET | Refills: 1 | Status: SHIPPED | OUTPATIENT
Start: 2024-08-08 | End: 2024-08-15

## 2024-08-14 DIAGNOSIS — M05.741 RHEUMATOID ARTHRITIS INVOLVING BOTH HANDS WITH POSITIVE RHEUMATOID FACTOR (H): ICD-10-CM

## 2024-08-14 DIAGNOSIS — Z79.899 HIGH RISK MEDICATION USE: ICD-10-CM

## 2024-08-14 DIAGNOSIS — M05.742 RHEUMATOID ARTHRITIS INVOLVING BOTH HANDS WITH POSITIVE RHEUMATOID FACTOR (H): ICD-10-CM

## 2024-08-15 ENCOUNTER — OFFICE VISIT (OUTPATIENT)
Dept: RHEUMATOLOGY | Facility: CLINIC | Age: 74
End: 2024-08-15
Attending: STUDENT IN AN ORGANIZED HEALTH CARE EDUCATION/TRAINING PROGRAM
Payer: MEDICARE

## 2024-08-15 VITALS — SYSTOLIC BLOOD PRESSURE: 131 MMHG | OXYGEN SATURATION: 98 % | HEART RATE: 87 BPM | DIASTOLIC BLOOD PRESSURE: 78 MMHG

## 2024-08-15 DIAGNOSIS — M85.80 OSTEOPENIA, UNSPECIFIED LOCATION: Primary | ICD-10-CM

## 2024-08-15 DIAGNOSIS — E53.8 VITAMIN B12 DEFICIENCY DISEASE: ICD-10-CM

## 2024-08-15 DIAGNOSIS — M05.741 RHEUMATOID ARTHRITIS INVOLVING BOTH HANDS WITH POSITIVE RHEUMATOID FACTOR (H): ICD-10-CM

## 2024-08-15 DIAGNOSIS — N95.8 OTHER SPECIFIED MENOPAUSAL AND PERIMENOPAUSAL DISORDERS: ICD-10-CM

## 2024-08-15 DIAGNOSIS — M05.742 RHEUMATOID ARTHRITIS INVOLVING BOTH HANDS WITH POSITIVE RHEUMATOID FACTOR (H): ICD-10-CM

## 2024-08-15 PROCEDURE — G0463 HOSPITAL OUTPT CLINIC VISIT: HCPCS | Performed by: STUDENT IN AN ORGANIZED HEALTH CARE EDUCATION/TRAINING PROGRAM

## 2024-08-15 PROCEDURE — G2211 COMPLEX E/M VISIT ADD ON: HCPCS | Performed by: STUDENT IN AN ORGANIZED HEALTH CARE EDUCATION/TRAINING PROGRAM

## 2024-08-15 PROCEDURE — 99214 OFFICE O/P EST MOD 30 MIN: CPT | Performed by: STUDENT IN AN ORGANIZED HEALTH CARE EDUCATION/TRAINING PROGRAM

## 2024-08-15 RX ORDER — HYDROXYCHLOROQUINE SULFATE 200 MG/1
400 TABLET, FILM COATED ORAL DAILY
Qty: 180 TABLET | Refills: 0 | Status: SHIPPED | OUTPATIENT
Start: 2024-08-15 | End: 2024-10-01

## 2024-08-15 RX ORDER — FOLIC ACID 1 MG/1
1 TABLET ORAL DAILY
Qty: 90 TABLET | Refills: 1 | Status: SHIPPED | OUTPATIENT
Start: 2024-08-15 | End: 2024-09-23

## 2024-08-15 RX ORDER — LANOLIN ALCOHOL/MO/W.PET/CERES
2000 CREAM (GRAM) TOPICAL DAILY
Qty: 360 TABLET | Refills: 0 | Status: SHIPPED | OUTPATIENT
Start: 2024-08-15

## 2024-08-15 RX ORDER — METHOTREXATE 25 MG/ML
25 INJECTION, SOLUTION INTRA-ARTERIAL; INTRAMUSCULAR; INTRAVENOUS
Qty: 12 ML | Refills: 0 | Status: SHIPPED | OUTPATIENT
Start: 2024-08-15 | End: 2024-10-01

## 2024-08-15 ASSESSMENT — PAIN SCALES - GENERAL: PAINLEVEL: NO PAIN (0)

## 2024-08-15 NOTE — PROGRESS NOTES
RHEUMATOLOGY OUTPATIENT CLINIC NOTE     Referring Provider:  Carolina Meza MD    Review of Labs     2021  CCP antibody more than 340  Rheumatoid factor 340     SSA/SSB: Negative  dsDNA: Negative  JOHANNE panel: Negative  Complement C3/C4: Negative     Review of imaging     Hand X-rays 2024: showed erosive changes, new since 2021 suggestive of progression of radiographic findings despite good clinical control of RA.    Hand x-rays, 11/2021: Nonerosive, degenerative changes    Subjective     Visit date August 15, 2024    Rosa is a 74 year old female who presents today for follow up.    Since the last visit,    Interval hand x-rays showed progression of erosive disease therefore methotrexate dose was recommended to be increased.    - Workup 8/2/2024 showed: unremarkable CBC,AST,Creatinine, ESR, CRP.      Today, Rosa mentioned the following:    She denies flares from her RA  She is having morning stiffness for 2 hours. She is noticing increasing loss of dexterity and hand    She noticed swelling in her small joints of the hands PIP but not MCPs, Wrists, elbows, shoulders, knees or feet. She denies fevers, chills, recent infection, cough, shortness of breath    She is tolerating methotrexate well without notable side effects.     ROS    Current Medications   Methotrexate 25 mg weekly [split dose, Thursday]  Folic acid 1 mg daily    Objective   /78 (BP Location: Right arm, Patient Position: Sitting, Cuff Size: Adult Regular)   Pulse 87   SpO2 98%       PHYSICAL EXAMINATION  Physical Exam  HENT:      Head: Normocephalic.      Mouth/Throat:      Mouth: Mucous membranes are moist.   Eyes:      Conjunctiva/sclera: Conjunctivae normal.   Pulmonary:      Effort: Pulmonary effort is normal.      Breath sounds: Normal breath sounds.   Musculoskeletal:         General: No swelling or tenderness.      Comments: Subtle swelling and tenderness in few MCPs and PIPs bilaterally.   No noticeable swelling or significant  tenderness in the hands wrists, elbows, knees, ankles, feet. Range of motion in the shoulders and hips are within accepted range for age.     SJC:3/8  TJC: 3/28    Pain scale is 1/10   Skin:     Findings: No rash.   Neurological:      Mental Status: She is alert.       Assessment & Plan      # Seropositive erosive rheumatoid arthritis [rheumatoid factor, CCP positive]  # Screening for osteoporosis  # Vitamin D deficiency  # Low normal vitamin B12, questionable deficiency  # Screening for chronic infections  # High risk medication requiring frequent lab tests for toxicity monitoring  # Longitudinal care    Rosa is following for seropositive erosive rheumatoid arthritis.  Onset 2021, hands, clinically carpal tunnel syndrome.    Nonerosive x-ray at onset, rheumatoid factor/CCP antibody positive.    Follow-up x-rays in 2024 showed progression of erosive disease on methotrexate  Treatment included: Methotrexate, meloxicam as needed    # Seropositive erosive rheumatoid arthritis [rheumatoid factor, CCP positive]  She is noticing morning stiffness for 2 hours and loss of handgrip there is subtle swelling and tenderness today on exam.  Inflammatory markers otherwise unremarkable.  She had progression of erosive disease despite methotrexate monotherapy as proven by x-rays in 2024 compared to initial x-rays in 2021.  She has mild disease activity CDAI score 7.    We discussed making changes to her rheumatoid therapy by switching methotrexate from oral to subcutaneous to increase bioavailability and we discussed add-on therapy either hydroxychloroquine or biologic therapy.  Risks and side effects of both were discussed at length and she opted to initiate hydroxychloroquine add-on therapy as she wants to avoid immunosuppressive side effects from biologic.  Given her mild disease activity at the current time then reasonable to pursue hydroxychloroquine added on.  In the meantime we will get x-rays of the feet as reference for  future comparison.    Plan:   - Switch oral methotrexate 25 mg weekly, split dosing to injectable subcutaneous 25 mg weekly  - Continue folic acid 1 mg daily supplementation.  - [facility] [place] for teaching and education subcutaneous injection  - Initiate hydroxychloroquine 400 mg daily  - Baseline screening for eye exam for hydroxychloroquine    # Screening for osteoporosis  DEXA scan in 2017 showing osteopenia  Plan:  DEXA scan     # Vitamin D deficiency  Vitamin D, 2/2024: 24, adequate  Plan:   - Continue supplementation for now     # Low normal vitamin B12, questionable deficiency  Recommend supplementation     # Screening for chronic infections   2024  Hepatitis B Surface antigen: Negative  2021  Hep B surface antibody: Negative, hep B core antibody: Negative  Hep C antibody: Negative  QuantiFERON gold: Negative        # High risk medication requiring frequent lab tests for toxicity monitoring  Methotrexate Labs reviewed on 8/2/2024, no signs of concerning toxicity.  Standing orders in place     CBC -   Recent Labs   Lab Test 08/02/24  0724 06/07/24  1133 01/12/24  0810   WBC 4.5 7.0 4.4   HGB 13.1 12.7 13.4    254 267     Creatinine -   Recent Labs   Lab Test 08/02/24  0724 06/07/24  1133 01/12/24  0810   CR 0.51 0.54 0.54     LFT -   Recent Labs   Lab Test 08/02/24  0724 06/07/24  1133 01/12/24  0810 10/13/23  1033 12/27/22  1014   ALT  --   --  19 13 14   AST 18 18 20 16 18       # Longitudinal care  The longitudinal plan of care for the diagnosis(es)/condition(s) as documented were addressed during this visit. Due to the added complexity in care, I will continue to support Rosa in the subsequent management and with ongoing continuity of care.      33 minutes spent by me on the date of the encounter doing chart review, history and exam, documentation and further activities per the note      Return in about 3 months (around 11/15/2024) for Follow up.    Carolina Meza MD  [facility]  [facility] RHEUMATOLOGY

## 2024-08-15 NOTE — LETTER
8/15/2024       RE: Rosa Larson  [address]  [place] [address]     Dear Colleague,    Thank you for referring your patient, Rosa Larson, to the [facility] RHEUMATOLOGY at [facility]. Please see a copy of my visit note below.    RHEUMATOLOGY OUTPATIENT CLINIC NOTE     Referring Provider:  Carolina Meza MD    Review of Labs     2021  CCP antibody more than 340  Rheumatoid factor 340     SSA/SSB: Negative  dsDNA: Negative  JOHANNE panel: Negative  Complement C3/C4: Negative     Review of imaging     Hand X-rays 2024: showed erosive changes, new since 2021 suggestive of progression of radiographic findings despite good clinical control of RA.    Hand x-rays, 11/2021: Nonerosive, degenerative changes    Subjective    Visit date August 15, 2024    Rosa is a 74 year old female who presents today for follow up.    Since the last visit,    Interval hand x-rays showed progression of erosive disease therefore methotrexate dose was recommended to be increased.    - Workup 8/2/2024 showed: unremarkable CBC,AST,Creatinine, ESR, CRP.      Today, Rosa mentioned the following:    She denies flares from her RA  She is having morning stiffness for 2 hours. She is noticing increasing loss of dexterity and hand    She noticed swelling in her small joints of the hands PIP but not MCPs, Wrists, elbows, shoulders, knees or feet. She denies fevers, chills, recent infection, cough, shortness of breath    She is tolerating methotrexate well without notable side effects.     ROS    Current Medications   Methotrexate 25 mg weekly [split dose, Thursday]  Folic acid 1 mg daily    Objective  /78 (BP Location: Right arm, Patient Position: Sitting, Cuff Size: Adult Regular)   Pulse 87   SpO2 98%       PHYSICAL EXAMINATION  Physical Exam  HENT:      Head: Normocephalic.      Mouth/Throat:      Mouth: Mucous membranes are moist.   Eyes:       Conjunctiva/sclera: Conjunctivae normal.   Pulmonary:      Effort: Pulmonary effort is normal.      Breath sounds: Normal breath sounds.   Musculoskeletal:         General: No swelling or tenderness.      Comments: Subtle swelling and tenderness in few MCPs and PIPs bilaterally.   No noticeable swelling or significant tenderness in the hands wrists, elbows, knees, ankles, feet. Range of motion in the shoulders and hips are within accepted range for age.     SJC:3/8  TJC: 3/28    Pain scale is 1/10   Skin:     Findings: No rash.   Neurological:      Mental Status: She is alert.       Assessment & Plan     # Seropositive erosive rheumatoid arthritis [rheumatoid factor, CCP positive]  # Screening for osteoporosis  # Vitamin D deficiency  # Low normal vitamin B12, questionable deficiency  # Screening for chronic infections  # High risk medication requiring frequent lab tests for toxicity monitoring  # Longitudinal care    Rosa is following for seropositive erosive rheumatoid arthritis.  Onset 2021, hands, clinically carpal tunnel syndrome.    Nonerosive x-ray at onset, rheumatoid factor/CCP antibody positive.    Follow-up x-rays in 2024 showed progression of erosive disease on methotrexate  Treatment included: Methotrexate, meloxicam as needed    # Seropositive erosive rheumatoid arthritis [rheumatoid factor, CCP positive]  She is noticing morning stiffness for 2 hours and loss of handgrip there is subtle swelling and tenderness today on exam.  Inflammatory markers otherwise unremarkable.  She had progression of erosive disease despite methotrexate monotherapy as proven by x-rays in 2024 compared to initial x-rays in 2021.  She has mild disease activity CDAI score 7.    We discussed making changes to her rheumatoid therapy by switching methotrexate from oral to subcutaneous to increase bioavailability and we discussed add-on therapy either hydroxychloroquine or biologic therapy.  Risks and side effects of both were  discussed at length and she opted to initiate hydroxychloroquine add-on therapy as she wants to avoid immunosuppressive side effects from biologic.  Given her mild disease activity at the current time then reasonable to pursue hydroxychloroquine added on.  In the meantime we will get x-rays of the feet as reference for future comparison.    Plan:   - Switch oral methotrexate 25 mg weekly, split dosing to injectable subcutaneous 25 mg weekly  - Continue folic acid 1 mg daily supplementation.  - [facility] pharmacy referral for teaching and education subcutaneous injection  - Initiate hydroxychloroquine 400 mg daily  - Baseline screening for eye exam for hydroxychloroquine    # Screening for osteoporosis  DEXA scan in 2017 showing osteopenia  Plan:  DEXA scan     # Vitamin D deficiency  Vitamin D, 2/2024: 24, adequate  Plan:   - Continue supplementation for now     # Low normal vitamin B12, questionable deficiency  Recommend supplementation     # Screening for chronic infections   2024  Hepatitis B Surface antigen: Negative  2021  Hep B surface antibody: Negative, hep B core antibody: Negative  Hep C antibody: Negative  QuantiFERON gold: Negative        # High risk medication requiring frequent lab tests for toxicity monitoring  Methotrexate Labs reviewed on 8/2/2024, no signs of concerning toxicity.  Standing orders in place     CBC -   Recent Labs   Lab Test 08/02/24  0724 06/07/24  1133 01/12/24  0810   WBC 4.5 7.0 4.4   HGB 13.1 12.7 13.4    254 267     Creatinine -   Recent Labs   Lab Test 08/02/24  0724 06/07/24  1133 01/12/24  0810   CR 0.51 0.54 0.54     LFT -   Recent Labs   Lab Test 08/02/24  0724 06/07/24  1133 01/12/24  0810 10/13/23  1033 12/27/22  1014   ALT  --   --  19 13 14   AST 18 18 20 16 18       # Longitudinal care  The longitudinal plan of care for the diagnosis(es)/condition(s) as documented were addressed during this visit. Due to the added complexity in care, I will continue to support Rosa  in the subsequent management and with ongoing continuity of care.      33 minutes spent by me on the date of the encounter doing chart review, history and exam, documentation and further activities per the note      Return in about 3 months (around 11/15/2024) for Follow up.    Carolina Meza MD  [facility] RHEUMATOLOGY      Again, thank you for allowing me to participate in the care of your patient.      Sincerely,    Carolina Meza MD

## 2024-08-15 NOTE — PATIENT INSTRUCTIONS
Our plan for today is:    - Tests:    X-rays of the feet   DEXA scan for osteoporosis evaluation    - Medications:  Initiation of hydroxychloroquine 400 mg once daily  Switching methotrexate from oral to subcutaneous injection and stay on 25 mg weekly and stay on folic acid 1 mg daily

## 2024-08-15 NOTE — NURSING NOTE
Chief Complaint   Patient presents with    RECHECK     /78 (BP Location: Right arm, Patient Position: Sitting, Cuff Size: Adult Regular)   Pulse 87   SpO2 98%

## 2024-08-17 ENCOUNTER — MYC MEDICAL ADVICE (OUTPATIENT)
Dept: RHEUMATOLOGY | Facility: CLINIC | Age: 74
End: 2024-08-17
Payer: MEDICARE

## 2024-08-17 DIAGNOSIS — M05.741 RHEUMATOID ARTHRITIS INVOLVING BOTH HANDS WITH POSITIVE RHEUMATOID FACTOR (H): Primary | ICD-10-CM

## 2024-08-17 DIAGNOSIS — M05.742 RHEUMATOID ARTHRITIS INVOLVING BOTH HANDS WITH POSITIVE RHEUMATOID FACTOR (H): Primary | ICD-10-CM

## 2024-08-19 RX ORDER — METHOTREXATE 2.5 MG/1
25 TABLET ORAL
Qty: 96 TABLET | Refills: 1 | OUTPATIENT
Start: 2024-08-19

## 2024-08-19 NOTE — TELEPHONE ENCOUNTER
Request for methotrexate 2.5 MG tablet refused, duplicate request, #12,0 refills ordered on 8/15/24  Called to pharmacy

## 2024-08-26 ENCOUNTER — TELEPHONE (OUTPATIENT)
Dept: RHEUMATOLOGY | Facility: CLINIC | Age: 74
End: 2024-08-26
Payer: MEDICARE

## 2024-08-26 NOTE — TELEPHONE ENCOUNTER
Called pt at request of Dr. Meza and provided [facility] clinic phone number: [phone].

## 2024-08-26 NOTE — TELEPHONE ENCOUNTER
There is no specific referral location indicated. The pt should reach out to ophthalmology to schedule unless otherwise instructed.

## 2024-08-27 ENCOUNTER — ANCILLARY PROCEDURE (OUTPATIENT)
Dept: GENERAL RADIOLOGY | Facility: CLINIC | Age: 74
End: 2024-08-27
Attending: STUDENT IN AN ORGANIZED HEALTH CARE EDUCATION/TRAINING PROGRAM
Payer: MEDICARE

## 2024-08-27 DIAGNOSIS — M05.741 RHEUMATOID ARTHRITIS INVOLVING BOTH HANDS WITH POSITIVE RHEUMATOID FACTOR (H): ICD-10-CM

## 2024-08-27 DIAGNOSIS — M05.742 RHEUMATOID ARTHRITIS INVOLVING BOTH HANDS WITH POSITIVE RHEUMATOID FACTOR (H): ICD-10-CM

## 2024-08-27 PROCEDURE — 73630 X-RAY EXAM OF FOOT: CPT | Mod: GC | Performed by: RADIOLOGY

## 2024-08-29 ENCOUNTER — VIRTUAL VISIT (OUTPATIENT)
Dept: RHEUMATOLOGY | Facility: CLINIC | Age: 74
End: 2024-08-29
Attending: STUDENT IN AN ORGANIZED HEALTH CARE EDUCATION/TRAINING PROGRAM
Payer: MEDICARE

## 2024-08-29 DIAGNOSIS — M05.742 RHEUMATOID ARTHRITIS INVOLVING BOTH HANDS WITH POSITIVE RHEUMATOID FACTOR (H): Primary | ICD-10-CM

## 2024-08-29 DIAGNOSIS — M05.741 RHEUMATOID ARTHRITIS INVOLVING BOTH HANDS WITH POSITIVE RHEUMATOID FACTOR (H): Primary | ICD-10-CM

## 2024-08-29 DIAGNOSIS — Z78.9 TAKES DIETARY SUPPLEMENTS: ICD-10-CM

## 2024-08-29 DIAGNOSIS — Z71.85 VACCINE COUNSELING: ICD-10-CM

## 2024-08-29 DIAGNOSIS — I47.19 PAROXYSMAL ATRIAL TACHYCARDIA (H): ICD-10-CM

## 2024-08-29 NOTE — PROGRESS NOTES
Medication Therapy Management (MTM) Encounter    ASSESSMENT:                            Medication Adherence/Access: Discussed that this is common and the needles and syringes are typically low cost. She can also use [employer] or other discount card to reduce price.    Rheumatoid arthritis: Provided education on subcutaneous methotrexate today including dosing, general administration, side effects (both common/serious), precautions.  Would benefit from starting methotrexate 25 mg subcutaneously once weekly, starting 7 days after her last dose of oral therapy. Recommend continue hydroxychloroquine and folic acid at current doses. Discussed importance of annual eye exams while she is on hydroxychloroquine. She is up to date on lab monitoring and next due approximately 11/2/24.     Supplements: Stable.    Paroxysmal atrial tachycardia: Stable.    Vaccines: Per ACIP guidelines, patient is eligible for Pneumococcal     PLAN:                            Started methotrexate 1 mL (25 mg) subcutaneous injection 7 days after your last dose of oral methotrexate. Once you start injections, stop oral methotrexate.  Subcutaneous injection administration information:  Video: https://www.GoPlanit.Corelytics/SpeakGlobalfairview/Content/StdDocument.aspx?MRDEQJW=vld6982  Step by step instructions on how to draw up dose: https://medlineplus.gov/ency/patientinstructions/399701.htm  Consider receiving Prevnar 20 (pneumococcal) vaccination  Information about vaccine attached to After Visit Summary in MyChart  Continue all other medications as prescribed    Follow-up: Return in 26 days (on 9/24/2024) for Follow up, with me, using a video visit.    SUBJECTIVE/OBJECTIVE:                          Rosa Larson is a 74 year old female seen for an initial visit. She was referred to me from Carolina Meza MD.      Reason for visit: Methotrexate injection training and hydroxychloroquine new start for rheumatoid arthritis.    Allergies/ADRs: Reviewed in  chart  Past Medical History: Reviewed in chart  Tobacco: She reports that she has never smoked. She has never used smokeless tobacco.  Alcohol: 1-3 beverages / week    Medication Adherence/Access: Needles and syringes were not covered by her insurance.    Rheumatoid arthritis:  Hydroxychloroquine 400 mg once daily  Methotrexate 25 mg by mouth every 7 days - take 5 tablets in AM and 5 tablets in PM  Folic acid 1 mg once daily    Patient reports no side effects after starting hydroxychloroquine. She has morning stiffness that lasts about 2-3 hours (previously reports it was 2-4 hours). States she is able to close her hands completely into fists and was unable to do that before starting on hydroxychloroquine. States there is swelling in her fingers where she can't put on wide banded rings and has to use rings with smaller bands. Endorses she had previous surgeries on her feet and isn't sure how successful they were because the bottoms of her feet can hurt. Plan from her rheumatologist is to switch her from oral methotrexate to subcutaneous injections for better bioavailability. Patient has not picked up subcutaneous formulation yet and took 5 tablets of the oral formulation this morning as this is her usual day to dose.    Previous therapies: meloxicam, prednisone    Last lab monitoring completed: 8/2/2024    Supplements   Omega 3 fatty acids 1200 mg once daily  Vitamin D3 1000 units once daily  Vitamin C 500 mg once daily  Cyanocobalamin 2000 mcg once daily  Calcium supplement twice daily    No reported issues at this time.      Paroxysmal atrial tachycardia:  Metoprolol succinate ER 25 mg at bedtime    Patient was evaluated for dizziness by cardiology and started on metoprolol. Found the dizziness has stopped and she is having no side effects. States she was also evaluated by neurology and they found no concerns for cause of dizziness and felt that if metoprolol was effective that she stay on  it.    Vaccines:  Immunization History   Administered Date(s) Administered    COVID-19 12+ (2023-24) (MODERNA) 09/27/2023, 04/15/2024    COVID-19 Bivalent 18+ (Moderna) 05/12/2023    COVID-19 Monovalent 18+ (Moderna) 02/06/2021, 03/06/2021, 11/06/2021, 02/28/2022, 09/19/2022    Flu 65+ Years 09/12/2019, 09/15/2021    Flu, Unspecified 09/15/2016, 09/14/2017, 09/13/2018, 09/01/2020    Influenza (High Dose) 3 valent vaccine 09/13/2018    Influenza (IIV3) PF 10/27/2010, 10/21/2011, 09/12/2012    Influenza Vaccine 65+ (FLUAD) 09/15/2020, 09/16/2021, 09/19/2022    Influenza Vaccine >6 months,quad, PF 09/09/2014, 09/19/2022    Nasal Influenza Vaccine 2-49 (FluMist) 10/27/2010, 10/21/2011, 09/12/2012, 09/09/2014, 09/15/2016, 09/14/2017    Pneumo Conj 13-V (2010&after) 01/19/2017    Pneumococcal 23 valent 11/28/2018    TDAP Vaccine (Boostrix) 01/11/2013    Td (Adult), Adsorbed 12/27/2022    Zoster recombinant adjuvanted (SHINGRIX) 12/04/2021, 03/11/2022    Zoster vaccine, live 01/12/2017     Today's Vitals: There were no vitals taken for this visit.  ----------------    I spent 42 minutes with this patient today. All changes were made via collaborative practice agreement with Carolina Meza A copy of the visit note was provided to the patient's provider(s).    A summary of these recommendations was sent via [employer].    Prakash Muñoz, PharmD  Medication Therapy Management Pharmacist  [facility]  Phone: [phone]    Telemedicine Visit Details  Type of service:  Video Conference via [employer]  Joined the call at 8/29/2024, 8:30:30 am.  Left the call at 8/29/2024, 9:13:20 am.  You were on the call for 42 minutes 49 seconds .     Medication Therapy Recommendations  Rheumatoid arthritis involving both hands with positive rheumatoid factor (H)    Current Medication: methotrexate 50 MG/2ML injection   Rationale: Does not understand instructions - Adherence - Adherence   Recommendation: Provide  Education   Status: Patient Agreed - Adherence/Education   Note: Subq injection training         Vaccine counseling    Rationale: Preventive therapy - Needs additional medication therapy - Indication   Recommendation: Provide Education   Status: Patient Agreed - Adherence/Education

## 2024-08-29 NOTE — PATIENT INSTRUCTIONS
"Recommendations from today's MTM visit:                                                    MTM (medication therapy management) is a service provided by a clinical pharmacist designed to help you get the most of out of your medicines.   Today we reviewed what your medicines are for, how to know if they are working, that your medicines are safe and how to make your medicine regimen as easy as possible.      Started methotrexate 1 mL (25 mg) subcutaneous injection 7 days after your last dose of oral methotrexate. Once you start injections, stop oral methotrexate.  Subcutaneous injection administration information:  Video: https://www.Your Office Agent.net/Efficient Cloudthfairview/Content/StdDocument.aspx?UAUPHEN=ngg5131  Step by step instructions on how to draw up dose: https://medlineplus.gov/ency/patientinstructions/604469.htm  Consider receiving Prevnar 20 (pneumococcal) vaccination  Information about vaccine attached to After Visit Summary in MyChart  Continue all other medications as prescribed    Follow-up: Return in 26 days (on 9/24/2024) for Follow up, with me, using a video visit.    It was great speaking with you today.  I value your experience and would be very thankful for your time in providing feedback in our clinic survey. In the next few days, you may receive an email or text message from [employer] with a link to a survey related to your  clinical pharmacist.\"     To schedule another MTM appointment, please call the clinic directly or you may call the MTM scheduling line at [phone] or toll-free at [phone].     My Clinical Pharmacist's contact information:                                                      Please feel free to contact me with any questions or concerns you have.      Prakash Muñoz, PharmD  Medication Therapy Management Pharmacist  [facility]  Phone: [phone]   "

## 2024-09-04 ENCOUNTER — TELEPHONE (OUTPATIENT)
Dept: RHEUMATOLOGY | Facility: CLINIC | Age: 74
End: 2024-09-04
Payer: MEDICARE

## 2024-09-05 ENCOUNTER — TRANSFERRED RECORDS (OUTPATIENT)
Dept: HEALTH INFORMATION MANAGEMENT | Facility: CLINIC | Age: 74
End: 2024-09-05
Payer: MEDICARE

## 2024-09-05 ENCOUNTER — TELEPHONE (OUTPATIENT)
Dept: RHEUMATOLOGY | Facility: CLINIC | Age: 74
End: 2024-09-05
Payer: MEDICARE

## 2024-09-05 DIAGNOSIS — M05.742 RHEUMATOID ARTHRITIS INVOLVING BOTH HANDS WITH POSITIVE RHEUMATOID FACTOR (H): Primary | ICD-10-CM

## 2024-09-05 DIAGNOSIS — M05.741 RHEUMATOID ARTHRITIS INVOLVING BOTH HANDS WITH POSITIVE RHEUMATOID FACTOR (H): Primary | ICD-10-CM

## 2024-09-05 NOTE — TELEPHONE ENCOUNTER
[facility] called and left me voicemail stating they didn't receive order for needles and syringes on 8/15. Resent order per CPA with Dr. Meza.    Prakash Muñoz, PharmD  Medication Therapy Management Pharmacist  [facility]  Phone: [phone]

## 2024-09-18 DIAGNOSIS — R42 DIZZINESS: ICD-10-CM

## 2024-09-18 DIAGNOSIS — I47.19 PAT (PAROXYSMAL ATRIAL TACHYCARDIA) (H): ICD-10-CM

## 2024-09-23 ENCOUNTER — TELEPHONE (OUTPATIENT)
Dept: RHEUMATOLOGY | Facility: CLINIC | Age: 74
End: 2024-09-23
Payer: MEDICARE

## 2024-09-23 DIAGNOSIS — M05.741 RHEUMATOID ARTHRITIS INVOLVING BOTH HANDS WITH POSITIVE RHEUMATOID FACTOR (H): Primary | ICD-10-CM

## 2024-09-23 DIAGNOSIS — I47.19 PAT (PAROXYSMAL ATRIAL TACHYCARDIA) (H): ICD-10-CM

## 2024-09-23 DIAGNOSIS — M05.742 RHEUMATOID ARTHRITIS INVOLVING BOTH HANDS WITH POSITIVE RHEUMATOID FACTOR (H): Primary | ICD-10-CM

## 2024-09-23 DIAGNOSIS — M05.741 RHEUMATOID ARTHRITIS INVOLVING BOTH HANDS WITH POSITIVE RHEUMATOID FACTOR (H): ICD-10-CM

## 2024-09-23 DIAGNOSIS — M05.742 RHEUMATOID ARTHRITIS INVOLVING BOTH HANDS WITH POSITIVE RHEUMATOID FACTOR (H): ICD-10-CM

## 2024-09-23 DIAGNOSIS — R42 DIZZINESS: ICD-10-CM

## 2024-09-23 RX ORDER — FOLIC ACID 1 MG/1
1 TABLET ORAL DAILY
Qty: 90 TABLET | Refills: 1 | Status: SHIPPED | OUTPATIENT
Start: 2024-09-23

## 2024-09-23 RX ORDER — FOLIC ACID 1 MG/1
1 TABLET ORAL DAILY
Qty: 90 TABLET | Refills: 1 | OUTPATIENT
Start: 2024-09-23

## 2024-09-23 NOTE — TELEPHONE ENCOUNTER
Rosa called to request a refill of her metoprolol and folic acid. She is hoping this can be refilled today as she is very few doses left.    She would like a [employer] message notifying her when this has been done.    Thank you,  Miriam Betancourt  [facility]

## 2024-09-23 NOTE — TELEPHONE ENCOUNTER
Denied folic acid refill as it was approved for #90 with 1 refill on 8/15/24.    Forwarding refill of metoprolol to cardiology department.    Prakash Muñoz, PharmD  Medication Therapy Management Pharmacist  [facility]  Phone: [phone]

## 2024-09-23 NOTE — TELEPHONE ENCOUNTER
Folic acid reordered per CPA with Dr. Meza.    Prakash Muñoz, PharmD  Medication Therapy Management Pharmacist  [facility]  Phone: [phone]

## 2024-09-24 RX ORDER — METOPROLOL SUCCINATE 25 MG/1
25 TABLET, EXTENDED RELEASE ORAL AT BEDTIME
Qty: 30 TABLET | Refills: 0 | Status: SHIPPED | OUTPATIENT
Start: 2024-09-24

## 2024-09-24 RX ORDER — METOPROLOL SUCCINATE 25 MG/1
TABLET, EXTENDED RELEASE ORAL
Qty: 90 TABLET | Refills: 0 | Status: SHIPPED | OUTPATIENT
Start: 2024-09-24

## 2024-09-24 NOTE — TELEPHONE ENCOUNTER
metoprolol succinate ER (TOPROL XL) 25 MG 24 hr tablet   Disp-90 tablet, R-3,   Start: 10/03/2023     9/7/2023  [facility] Jennifer Truong MD  Cardiovascular Disease    Nv: none    Routed because: appt needed    Beta-Blockers Protocol [id number]/18/2024 12:44 AM   Protocol Details Recent (12 mo) or future (90 days) visit within the authorizing provider's specialty

## 2024-10-01 ENCOUNTER — VIRTUAL VISIT (OUTPATIENT)
Dept: PHARMACY | Facility: CLINIC | Age: 74
End: 2024-10-01
Attending: STUDENT IN AN ORGANIZED HEALTH CARE EDUCATION/TRAINING PROGRAM
Payer: MEDICARE

## 2024-10-01 DIAGNOSIS — M05.742 RHEUMATOID ARTHRITIS INVOLVING BOTH HANDS WITH POSITIVE RHEUMATOID FACTOR (H): Primary | ICD-10-CM

## 2024-10-01 DIAGNOSIS — Z71.85 VACCINE COUNSELING: ICD-10-CM

## 2024-10-01 DIAGNOSIS — M05.741 RHEUMATOID ARTHRITIS INVOLVING BOTH HANDS WITH POSITIVE RHEUMATOID FACTOR (H): Primary | ICD-10-CM

## 2024-10-01 RX ORDER — METHOTREXATE 25 MG/ML
25 INJECTION, SOLUTION INTRA-ARTERIAL; INTRAMUSCULAR; INTRAVENOUS
Qty: 12 ML | Refills: 0 | Status: SHIPPED | OUTPATIENT
Start: 2024-10-01

## 2024-10-01 RX ORDER — HYDROXYCHLOROQUINE SULFATE 200 MG/1
400 TABLET, FILM COATED ORAL DAILY
Qty: 180 TABLET | Refills: 0 | Status: SHIPPED | OUTPATIENT
Start: 2024-10-01

## 2024-10-01 NOTE — PATIENT INSTRUCTIONS
"Recommendations from today's MTM visit:                                                       Continue current medication regimen  Consider the following vaccines:   Pneumonia (Prevnar 20)  Annual flu  Schedule annual visit with Dr. Harvey    Follow-up: Return in about 1 year (around 10/1/2025) for Follow up, with me, using a video visit.    It was great speaking with you today.  I value your experience and would be very thankful for your time in providing feedback in our clinic survey. In the next few days, you may receive an email or text message from [employer] with a link to a survey related to your  clinical pharmacist.\"     To schedule another MTM appointment, please call the clinic directly or you may call the MTM scheduling line at [phone] or toll-free at [phone].     My Clinical Pharmacist's contact information:                                                      Please feel free to contact me with any questions or concerns you have.      Prakash Muñoz, PharmD  Medication Therapy Management Pharmacist  [facility]  Phone: [phone]   "

## 2024-10-01 NOTE — PROGRESS NOTES
Medication Therapy Management (MTM) Encounter    ASSESSMENT:                            Medication Adherence/Access: No issues identified    Rheumatoid arthritis: Patient would benefit from continuing current medication regimen. She is up to date on lab monitoring and is scheduled for labs 11/14/2024, which is appropriate timing. She is up to date on annual Plaquenil eye exam.    Vaccines: Per ACIP guidelines, patient is eligible for Influenza and Pneumococcal     PLAN:                            Continue current medication regimen  Consider the following vaccines:   Pneumonia (Prevnar 20)  Annual flu  Schedule annual visit with Dr. Harvey    Follow-up: Return in about 1 year (around 10/1/2025) for Follow up, with me, using a video visit.    SUBJECTIVE/OBJECTIVE:                          Rosa Larson is a 74 year old female seen for a follow-up visit from 08/29/2024. She was referred to me from Carolina Meza MD.       Reason for visit: Rheumatoid arthritis follow up.    Allergies/ADRs: Reviewed in chart  Past Medical History: Reviewed in chart  Tobacco: She reports that she has never smoked. She has never used smokeless tobacco.  Alcohol: 1-3 beverages / week     Medication Adherence/Access: no issues reported    Rheumatoid arthritis:  Hydroxychloroquine 400 mg once daily  Methotrexate 25 mg subcutaneously once weekly on Thursdays (started 9/19/2024)  Folic acid 1 mg once daily    Patient reports things are going well since switching from oral methotrexate to injections. She is having no side effects or troubles with administration. Reports her morning stiffness has improved greatly and is very minimal where it no longer lasts for a few hours. She is seeing less swelling in her hands where she can wear wide banded rings, previously couldn't only wear small banded rings. Reports no side effects to hydroxychloroquine or folic acid.    Previous therapies: meloxicam, prednisone, methotrexate oral (switched to  subcutaneous injection for better bioavailability)    Last lab monitoring completed: 08/02/2024 Last eye exam: 9/5/2024 at [facility]    Vaccines:   Immunization History   Administered Date(s) Administered    COVID-19 12+ (MODERNA) 09/27/2023, 04/15/2024, 09/06/2024    COVID-19 Bivalent 18+ (Moderna) 05/12/2023    COVID-19 Monovalent 18+ (Moderna) 02/06/2021, 03/06/2021, 11/06/2021, 02/28/2022, 09/19/2022    Flu 65+ (Fluad) 09/12/2019, 09/15/2021    Flu, Unspecified 09/15/2016, 09/14/2017, 09/13/2018, 09/01/2020    Influenza (High Dose) Trivalent,PF (Fluzone) 09/13/2018    Influenza (IIV3) PF 10/27/2010, 10/21/2011, 09/12/2012    Influenza Vaccine 65+ (FLUAD) 09/15/2020, 09/16/2021, 09/19/2022    Influenza Vaccine >6 months,quad, PF 09/09/2014, 09/19/2022    Nasal Influenza Vaccine 2-49 (FluMist) 10/27/2010, 10/21/2011, 09/12/2012, 09/09/2014, 09/15/2016, 09/14/2017    Pneumo Conj 13-V (2010&after) 01/19/2017    Pneumococcal 23 valent 11/28/2018    TDAP Vaccine (Boostrix) 01/11/2013    Td (Adult), Adsorbed 12/27/2022    Zoster recombinant adjuvanted (SHINGRIX) 12/04/2021, 03/11/2022    Zoster vaccine, live 01/12/2017     Today's Vitals: There were no vitals taken for this visit.  ----------------    I spent 19 minutes with this patient today. All changes were made via collaborative practice agreement with Carolina Meza A copy of the visit note was provided to the patient's provider(s).    A summary of these recommendations was sent via [employer].    Prakash Muñoz, PharmD  Medication Therapy Management Pharmacist  [facility]  Phone: [phone]    Telemedicine Visit Details  The patient's medications can be safely assessed via a telemedicine encounter.  Type of service:  Telephone visit  Originating Location (pt. Location): Home    Distant Location (provider location):  On-site  Start Time:  9:06 AM  End Time: 9:25 AM     Medication Therapy Recommendations  Vaccine  counseling    Rationale: Preventive therapy - Needs additional medication therapy - Indication   Recommendation: Provide Education   Status: Patient Agreed - Adherence/Education

## 2024-10-14 ENCOUNTER — ANCILLARY PROCEDURE (OUTPATIENT)
Dept: BONE DENSITY | Facility: CLINIC | Age: 74
End: 2024-10-14
Attending: STUDENT IN AN ORGANIZED HEALTH CARE EDUCATION/TRAINING PROGRAM
Payer: MEDICARE

## 2024-10-14 DIAGNOSIS — M85.80 OSTEOPENIA, UNSPECIFIED LOCATION: ICD-10-CM

## 2024-10-14 DIAGNOSIS — N95.8 OTHER SPECIFIED MENOPAUSAL AND PERIMENOPAUSAL DISORDERS: ICD-10-CM

## 2024-10-14 PROCEDURE — 77080 DXA BONE DENSITY AXIAL: CPT | Performed by: INTERNAL MEDICINE

## 2024-11-06 ENCOUNTER — MYC REFILL (OUTPATIENT)
Dept: PHARMACY | Facility: CLINIC | Age: 74
End: 2024-11-06
Payer: MEDICARE

## 2024-11-06 DIAGNOSIS — M05.742 RHEUMATOID ARTHRITIS INVOLVING BOTH HANDS WITH POSITIVE RHEUMATOID FACTOR (H): ICD-10-CM

## 2024-11-06 DIAGNOSIS — M05.741 RHEUMATOID ARTHRITIS INVOLVING BOTH HANDS WITH POSITIVE RHEUMATOID FACTOR (H): ICD-10-CM

## 2024-11-06 RX ORDER — HYDROXYCHLOROQUINE SULFATE 200 MG/1
400 TABLET, FILM COATED ORAL DAILY
Qty: 180 TABLET | Refills: 0 | Status: CANCELLED | OUTPATIENT
Start: 2024-11-06

## 2024-11-06 NOTE — TELEPHONE ENCOUNTER
hydroxychloroquine (PLAQUENIL) 200 MG tablet      Rx available  hydroxychloroquine (PLAQUENIL) 200 MG tablet   180 tablet 0 10/1/2024 -- No  Sig - Route: Take 2 tablets (400 mg) by mouth daily. - Oral

## 2024-11-07 DIAGNOSIS — M05.741 RHEUMATOID ARTHRITIS INVOLVING BOTH HANDS WITH POSITIVE RHEUMATOID FACTOR (H): ICD-10-CM

## 2024-11-07 DIAGNOSIS — M05.742 RHEUMATOID ARTHRITIS INVOLVING BOTH HANDS WITH POSITIVE RHEUMATOID FACTOR (H): ICD-10-CM

## 2024-11-11 RX ORDER — METHOTREXATE 25 MG/ML
25 INJECTION, SOLUTION INTRA-ARTERIAL; INTRAMUSCULAR; INTRAVENOUS
Qty: 12 ML | Refills: 0 | OUTPATIENT
Start: 2024-11-11

## 2024-11-14 ENCOUNTER — LAB (OUTPATIENT)
Dept: LAB | Facility: CLINIC | Age: 74
End: 2024-11-14
Payer: MEDICARE

## 2024-11-14 DIAGNOSIS — M05.742 RHEUMATOID ARTHRITIS INVOLVING BOTH HANDS WITH POSITIVE RHEUMATOID FACTOR (H): ICD-10-CM

## 2024-11-14 DIAGNOSIS — M05.741 RHEUMATOID ARTHRITIS INVOLVING BOTH HANDS WITH POSITIVE RHEUMATOID FACTOR (H): ICD-10-CM

## 2024-11-14 LAB
AST SERPL W P-5'-P-CCNC: 17 U/L (ref 0–45)
BASOPHILS # BLD AUTO: 0.1 10E3/UL (ref 0–0.2)
BASOPHILS NFR BLD AUTO: 1 %
CREAT SERPL-MCNC: 0.58 MG/DL (ref 0.51–0.95)
CRP SERPL-MCNC: 6.02 MG/L
EGFRCR SERPLBLD CKD-EPI 2021: >90 ML/MIN/1.73M2
EOSINOPHIL # BLD AUTO: 0.2 10E3/UL (ref 0–0.7)
EOSINOPHIL NFR BLD AUTO: 2 %
ERYTHROCYTE [DISTWIDTH] IN BLOOD BY AUTOMATED COUNT: 12.9 % (ref 10–15)
ERYTHROCYTE [SEDIMENTATION RATE] IN BLOOD BY WESTERGREN METHOD: 22 MM/HR (ref 0–30)
HCT VFR BLD AUTO: 36.3 % (ref 35–47)
HGB BLD-MCNC: 12.5 G/DL (ref 11.7–15.7)
IMM GRANULOCYTES # BLD: 0 10E3/UL
IMM GRANULOCYTES NFR BLD: 0 %
LYMPHOCYTES # BLD AUTO: 1.3 10E3/UL (ref 0.8–5.3)
LYMPHOCYTES NFR BLD AUTO: 16 %
MCH RBC QN AUTO: 33.1 PG (ref 26.5–33)
MCHC RBC AUTO-ENTMCNC: 34.4 G/DL (ref 31.5–36.5)
MCV RBC AUTO: 96 FL (ref 78–100)
MONOCYTES # BLD AUTO: 0.6 10E3/UL (ref 0–1.3)
MONOCYTES NFR BLD AUTO: 8 %
NEUTROPHILS # BLD AUTO: 5.9 10E3/UL (ref 1.6–8.3)
NEUTROPHILS NFR BLD AUTO: 73 %
NRBC # BLD AUTO: 0 10E3/UL
NRBC BLD AUTO-RTO: 0 /100
PLATELET # BLD AUTO: 232 10E3/UL (ref 150–450)
RBC # BLD AUTO: 3.78 10E6/UL (ref 3.8–5.2)
WBC # BLD AUTO: 8 10E3/UL (ref 4–11)

## 2024-11-14 PROCEDURE — 85025 COMPLETE CBC W/AUTO DIFF WBC: CPT | Performed by: PATHOLOGY

## 2024-11-14 PROCEDURE — 36415 COLL VENOUS BLD VENIPUNCTURE: CPT | Performed by: PATHOLOGY

## 2024-11-14 PROCEDURE — 82565 ASSAY OF CREATININE: CPT | Performed by: PATHOLOGY

## 2024-11-14 PROCEDURE — 84450 TRANSFERASE (AST) (SGOT): CPT | Performed by: PATHOLOGY

## 2024-11-14 PROCEDURE — 85652 RBC SED RATE AUTOMATED: CPT | Performed by: PATHOLOGY

## 2024-11-14 PROCEDURE — 86140 C-REACTIVE PROTEIN: CPT | Performed by: PATHOLOGY

## 2024-11-24 ENCOUNTER — MYC REFILL (OUTPATIENT)
Dept: PHARMACY | Facility: OTHER | Age: 74
End: 2024-11-24
Payer: MEDICARE

## 2024-11-24 DIAGNOSIS — R42 DIZZINESS: ICD-10-CM

## 2024-11-24 DIAGNOSIS — I47.19 PAT (PAROXYSMAL ATRIAL TACHYCARDIA) (H): ICD-10-CM

## 2024-11-25 RX ORDER — METOPROLOL SUCCINATE 25 MG/1
25 TABLET, EXTENDED RELEASE ORAL AT BEDTIME
Qty: 90 TABLET | Refills: 0 | Status: SHIPPED | OUTPATIENT
Start: 2024-11-25

## 2024-11-25 NOTE — TELEPHONE ENCOUNTER
Medication requested: metoprolol succinate ER (TOPROL XL) 25 MG 24 hr tablet   Last office visit: 8/4/23  [facility] appointments: none  Medication last refilled: 9/24/24; 30 + 0 refills  Last qualifying labs:   BP Readings from Last 3 Encounters:   08/15/24 131/78   02/06/24 121/61   01/10/24 128/63     Routing refill request to provider for review/approval because:  Patient needs to be seen because it has been more than 1 year since last office visit.    Nic CENTENO, RN  11/25/24 12:00 PM

## 2024-11-29 ENCOUNTER — OFFICE VISIT (OUTPATIENT)
Dept: RHEUMATOLOGY | Facility: CLINIC | Age: 74
End: 2024-11-29
Attending: STUDENT IN AN ORGANIZED HEALTH CARE EDUCATION/TRAINING PROGRAM
Payer: MEDICARE

## 2024-11-29 VITALS — OXYGEN SATURATION: 99 % | SYSTOLIC BLOOD PRESSURE: 129 MMHG | DIASTOLIC BLOOD PRESSURE: 75 MMHG | HEART RATE: 74 BPM

## 2024-11-29 DIAGNOSIS — M05.741 RHEUMATOID ARTHRITIS INVOLVING BOTH HANDS WITH POSITIVE RHEUMATOID FACTOR (H): ICD-10-CM

## 2024-11-29 DIAGNOSIS — M85.80 OSTEOPENIA, UNSPECIFIED LOCATION: Primary | ICD-10-CM

## 2024-11-29 DIAGNOSIS — M05.742 RHEUMATOID ARTHRITIS INVOLVING BOTH HANDS WITH POSITIVE RHEUMATOID FACTOR (H): ICD-10-CM

## 2024-11-29 PROCEDURE — G0463 HOSPITAL OUTPT CLINIC VISIT: HCPCS | Performed by: STUDENT IN AN ORGANIZED HEALTH CARE EDUCATION/TRAINING PROGRAM

## 2024-11-29 PROCEDURE — G2211 COMPLEX E/M VISIT ADD ON: HCPCS | Performed by: STUDENT IN AN ORGANIZED HEALTH CARE EDUCATION/TRAINING PROGRAM

## 2024-11-29 PROCEDURE — 99214 OFFICE O/P EST MOD 30 MIN: CPT | Performed by: STUDENT IN AN ORGANIZED HEALTH CARE EDUCATION/TRAINING PROGRAM

## 2024-11-29 RX ORDER — FOLIC ACID 1 MG/1
1 TABLET ORAL DAILY
Qty: 90 TABLET | Refills: 1 | Status: SHIPPED | OUTPATIENT
Start: 2024-11-29

## 2024-11-29 RX ORDER — ALENDRONATE SODIUM 70 MG/1
70 TABLET ORAL
Qty: 12 TABLET | Refills: 0 | Status: SHIPPED | OUTPATIENT
Start: 2024-11-29 | End: 2025-02-27

## 2024-11-29 RX ORDER — METHOTREXATE 25 MG/ML
25 INJECTION, SOLUTION INTRA-ARTERIAL; INTRAMUSCULAR; INTRAVENOUS
Qty: 12 ML | Refills: 0 | Status: SHIPPED | OUTPATIENT
Start: 2024-11-29

## 2024-11-29 ASSESSMENT — PAIN SCALES - GENERAL: PAINLEVEL_OUTOF10: NO PAIN (0)

## 2024-11-29 NOTE — LETTER
11/29/2024       RE: Rosa Larson  [address]  [place] [address]     Dear Colleague,    Thank you for referring your patient, Rosa Larson, to the [facility] RHEUMATOLOGY at [facility]. Please see a copy of my visit note below.    RHEUMATOLOGY OUTPATIENT CLINIC NOTE     Referring Provider:  Carolina Meza MD    Review of Labs     2021  CCP antibody more than 340  Rheumatoid factor 340     SSA/SSB: Negative  dsDNA: Negative  JOHANNE panel: Negative  Complement C3/C4: Negative     Review of imaging     Feet x-rays, 8/2024:  1. No acute osseous abnormality.  2. Erosive changes in the left first metatarsal head and right first and fifth metatarsal heads.   3. Postoperative changes of right second, third, and fourth distal metatarsal shortening osteotomies with screw fixation. No evidence of hardware complication.  4. Hallux valgus bilaterally.    Hand X-rays 2024: showed erosive changes, new since 2021 suggestive of progression of radiographic findings despite good clinical control of RA.    Hand x-rays, 11/2021: Nonerosive, degenerative changes    Subjective    Visit date August 15, 2024    Rosa is a 74 year old female who presents today for follow up.    Since the last visit,     - Workup 11/14/2024 showed:     Hemoglobin within normal limits   WBC  within normal limits   Platelets  within normal limits   ESR  within normal limits   CRP  mildly elevated   AST  within normal limits   Creatinine  within normal limits     Today, Rosa mentioned the following:    She denies flares from her RA.   She noticed improvement in her morning stiffness (less in duration).   She does not feel limited in her hand activities as before  She has not noticed swelling in the hands, elbows, knees, feet.     She denies fevers, chills, recent infection, cough, shortness of breath      She is tolerating methotrexate well without notable side effects.      ROS    Current Medications   Methotrexate 25 mg weekly   Folic acid 1 mg daily  Hydroxychloroquine 400 mg daily (eye exam 9/2024, no toxicity)     Objective  /75 (BP Location: Left arm, Patient Position: Sitting, Cuff Size: Adult Large)   Pulse 74   SpO2 99%       PHYSICAL EXAMINATION  Physical Exam  HENT:      Head: Normocephalic.      Mouth/Throat:      Mouth: Mucous membranes are moist.   Eyes:      Conjunctiva/sclera: Conjunctivae normal.   Pulmonary:      Effort: Pulmonary effort is normal.      Breath sounds: Normal breath sounds.   Musculoskeletal:         General: No swelling or tenderness.      Comments: No noticeable swelling in the hands (MCP, PIP, DIP), wrists, elbows, knees, ankles, feet. Range of motion in the shoulders and hips are within accepted range for age.     SJC:0/28  TJC: 0/28    Pain scale is 0/10   Skin:     Findings: No rash.   Neurological:      Mental Status: She is alert.       Assessment & Plan     # Seropositive erosive rheumatoid arthritis [rheumatoid factor, CCP positive]  # Screening for osteoporosis  # Vitamin D deficiency  # Low normal vitamin B12, questionable deficiency  # Screening for chronic infections  # High risk medication requiring frequent lab tests for toxicity monitoring  # Longitudinal care    Rosa is following for seropositive erosive rheumatoid arthritis.  Onset 2021, hands, clinically carpal tunnel syndrome.    Nonerosive x-ray at onset, rheumatoid factor/CCP antibody positive.    Follow-up x-rays in 2024 showed progression of erosive disease on methotrexate  Treatment included: Methotrexate, meloxicam as needed    # Seropositive erosive rheumatoid arthritis [rheumatoid factor, CCP positive]    She is overall improving from rheumatoid arthritis standpoint with subcutaneous methotrexate and addition of hydroxychloroquine.  There is no evidence of active arthritis today on exam  Labs overall as in HPI, minimally elevated CRP to be monitored    Her  disease is now considered to be an clinical remission therefore we will continue the current therapy.    Plan:   - Continue methotrexate 25 mg weekly  - Continue folic acid 1 mg daily supplementation.   - Continue hydroxychloroquine 400 mg daily      # Screening for osteoporosis  DEXA scan in 2017 showing osteopenia  DEXA scan 2024 showing osteopenia, major FRAX 24.8, hip FRAX 6.4    Risks and side effects of alendronate were discussed and she is in agreement to start therapy.  Information about alendronate sent over [employer] for review.    Plan:  Start Alendronate once weekly      # Vitamin D deficiency  Vitamin D, 2/2024: 24, adequate  Plan:   - Continue supplementation for now     # Low normal vitamin B12, questionable deficiency  Continue supplementation     # Screening for chronic infections   2024  Hepatitis B Surface antigen: Negative  2021  Hep B surface antibody: Negative, hep B core antibody: Negative  Hep C antibody: Negative  QuantiFERON gold: Negative        # High risk medication requiring frequent lab tests for toxicity monitoring  Methotrexate   Labs reviewed on 11/14, no signs of concerning toxicity.  Standing orders in place        # Longitudinal care  The longitudinal plan of care for the diagnosis(es)/condition(s) as documented were addressed during this visit. Due to the added complexity in care, I will continue to support Rosa in the subsequent management and with ongoing continuity of care.      Review of the result(s) of each unique test - as HPI  Ordering of each unique test  Prescription drug management            Return in about 4 months (around 3/29/2025) for Follow up.    Carolina Meza MD  [facility] RHEUMATOLOGY      Again, thank you for allowing me to participate in the care of your patient.      Sincerely,    Carolina Meza MD

## 2024-11-29 NOTE — NURSING NOTE
Chief Complaint   Patient presents with    RECHECK     /75 (BP Location: Left arm, Patient Position: Sitting, Cuff Size: Adult Large)   Pulse 74   SpO2 99%

## 2024-11-29 NOTE — PROGRESS NOTES
RHEUMATOLOGY OUTPATIENT CLINIC NOTE     Referring Provider:  Carolina Meza MD    Review of Labs     2021  CCP antibody more than 340  Rheumatoid factor 340     SSA/SSB: Negative  dsDNA: Negative  JOHANNE panel: Negative  Complement C3/C4: Negative     Review of imaging     Feet x-rays, 8/2024:  1. No acute osseous abnormality.  2. Erosive changes in the left first metatarsal head and right first and fifth metatarsal heads.   3. Postoperative changes of right second, third, and fourth distal metatarsal shortening osteotomies with screw fixation. No evidence of hardware complication.  4. Hallux valgus bilaterally.    Hand X-rays 2024: showed erosive changes, new since 2021 suggestive of progression of radiographic findings despite good clinical control of RA.    Hand x-rays, 11/2021: Nonerosive, degenerative changes    Subjective     Visit date August 15, 2024    Rosa is a 74 year old female who presents today for follow up.    Since the last visit,     - Workup 11/14/2024 showed:     Hemoglobin within normal limits   WBC  within normal limits   Platelets  within normal limits   ESR  within normal limits   CRP  mildly elevated   AST  within normal limits   Creatinine  within normal limits     Today, Rosa mentioned the following:    She denies flares from her RA.   She noticed improvement in her morning stiffness (less in duration).   She does not feel limited in her hand activities as before  She has not noticed swelling in the hands, elbows, knees, feet.     She denies fevers, chills, recent infection, cough, shortness of breath      She is tolerating methotrexate well without notable side effects.     ROS    Current Medications   Methotrexate 25 mg weekly   Folic acid 1 mg daily  Hydroxychloroquine 400 mg daily (eye exam 9/2024, no toxicity)     Objective   /75 (BP Location: Left arm, Patient Position: Sitting, Cuff Size: Adult Large)   Pulse 74   SpO2 99%       PHYSICAL EXAMINATION  Physical  Exam  HENT:      Head: Normocephalic.      Mouth/Throat:      Mouth: Mucous membranes are moist.   Eyes:      Conjunctiva/sclera: Conjunctivae normal.   Pulmonary:      Effort: Pulmonary effort is normal.      Breath sounds: Normal breath sounds.   Musculoskeletal:         General: No swelling or tenderness.      Comments: No noticeable swelling in the hands (MCP, PIP, DIP), wrists, elbows, knees, ankles, feet. Range of motion in the shoulders and hips are within accepted range for age.     SJC:0/28  TJC: 0/28    Pain scale is 0/10   Skin:     Findings: No rash.   Neurological:      Mental Status: She is alert.       Assessment & Plan      # Seropositive erosive rheumatoid arthritis [rheumatoid factor, CCP positive]  # Screening for osteoporosis  # Vitamin D deficiency  # Low normal vitamin B12, questionable deficiency  # Screening for chronic infections  # High risk medication requiring frequent lab tests for toxicity monitoring  # Longitudinal care    Rosa is following for seropositive erosive rheumatoid arthritis.  Onset 2021, hands, clinically carpal tunnel syndrome.    Nonerosive x-ray at onset, rheumatoid factor/CCP antibody positive.    Follow-up x-rays in 2024 showed progression of erosive disease on methotrexate  Treatment included: Methotrexate, meloxicam as needed    # Seropositive erosive rheumatoid arthritis [rheumatoid factor, CCP positive]    She is overall improving from rheumatoid arthritis standpoint with subcutaneous methotrexate and addition of hydroxychloroquine.  There is no evidence of active arthritis today on exam  Labs overall as in HPI, minimally elevated CRP to be monitored    Her disease is now considered to be an clinical remission therefore we will continue the current therapy.    Plan:   - Continue methotrexate 25 mg weekly  - Continue folic acid 1 mg daily supplementation.   - Continue hydroxychloroquine 400 mg daily      # Screening for osteoporosis  DEXA scan in 2017 showing  osteopenia  DEXA scan 2024 showing osteopenia, major FRAX 24.8, hip FRAX 6.4    Risks and side effects of alendronate were discussed and she is in agreement to start therapy.  Information about alendronate sent over [employer] for review.    Plan:  Start Alendronate once weekly      # Vitamin D deficiency  Vitamin D, 2/2024: 24, adequate  Plan:   - Continue supplementation for now     # Low normal vitamin B12, questionable deficiency  Continue supplementation     # Screening for chronic infections   2024  Hepatitis B Surface antigen: Negative  2021  Hep B surface antibody: Negative, hep B core antibody: Negative  Hep C antibody: Negative  QuantiFERON gold: Negative        # High risk medication requiring frequent lab tests for toxicity monitoring  Methotrexate   Labs reviewed on 11/14, no signs of concerning toxicity.  Standing orders in place        # Longitudinal care  The longitudinal plan of care for the diagnosis(es)/condition(s) as documented were addressed during this visit. Due to the added complexity in care, I will continue to support Rosa in the subsequent management and with ongoing continuity of care.      Review of the result(s) of each unique test - as HPI  Ordering of each unique test  Prescription drug management            Return in about 4 months (around 3/29/2025) for Follow up.    Carolina Meza MD  [facility] RHEUMATOLOGY

## 2024-11-29 NOTE — PATIENT INSTRUCTIONS
Our plan for today is:    - Tests:    Labs before the next appointment     - Medications:    Continue Methotrexate 25 mg subcutaneous weekly   Continue Folic acid 1 mg daily  Continue Hydroxychloroquine 400 mg daily    Start Alendronate once weekly

## 2025-02-13 ENCOUNTER — LAB (OUTPATIENT)
Dept: LAB | Facility: CLINIC | Age: 75
End: 2025-02-13
Payer: MEDICARE

## 2025-02-13 DIAGNOSIS — M05.742 RHEUMATOID ARTHRITIS INVOLVING BOTH HANDS WITH POSITIVE RHEUMATOID FACTOR (H): ICD-10-CM

## 2025-02-13 DIAGNOSIS — M05.741 RHEUMATOID ARTHRITIS INVOLVING BOTH HANDS WITH POSITIVE RHEUMATOID FACTOR (H): ICD-10-CM

## 2025-02-13 LAB
AST SERPL W P-5'-P-CCNC: 16 U/L (ref 0–45)
BASOPHILS # BLD AUTO: 0 10E3/UL (ref 0–0.2)
BASOPHILS NFR BLD AUTO: 1 %
CREAT SERPL-MCNC: 0.49 MG/DL (ref 0.51–0.95)
CRP SERPL-MCNC: 8.97 MG/L
EGFRCR SERPLBLD CKD-EPI 2021: >90 ML/MIN/1.73M2
EOSINOPHIL # BLD AUTO: 0.1 10E3/UL (ref 0–0.7)
EOSINOPHIL NFR BLD AUTO: 1 %
ERYTHROCYTE [DISTWIDTH] IN BLOOD BY AUTOMATED COUNT: 12.2 % (ref 10–15)
ERYTHROCYTE [SEDIMENTATION RATE] IN BLOOD BY WESTERGREN METHOD: 24 MM/HR (ref 0–30)
HCT VFR BLD AUTO: 36.4 % (ref 35–47)
HGB BLD-MCNC: 12.3 G/DL (ref 11.7–15.7)
IMM GRANULOCYTES # BLD: 0 10E3/UL
IMM GRANULOCYTES NFR BLD: 0 %
LYMPHOCYTES # BLD AUTO: 1.6 10E3/UL (ref 0.8–5.3)
LYMPHOCYTES NFR BLD AUTO: 25 %
MCH RBC QN AUTO: 31.5 PG (ref 26.5–33)
MCHC RBC AUTO-ENTMCNC: 33.8 G/DL (ref 31.5–36.5)
MCV RBC AUTO: 93 FL (ref 78–100)
MONOCYTES # BLD AUTO: 0.6 10E3/UL (ref 0–1.3)
MONOCYTES NFR BLD AUTO: 9 %
NEUTROPHILS # BLD AUTO: 4.2 10E3/UL (ref 1.6–8.3)
NEUTROPHILS NFR BLD AUTO: 64 %
NRBC # BLD AUTO: 0 10E3/UL
NRBC BLD AUTO-RTO: 0 /100
PLATELET # BLD AUTO: 282 10E3/UL (ref 150–450)
RBC # BLD AUTO: 3.9 10E6/UL (ref 3.8–5.2)
WBC # BLD AUTO: 6.5 10E3/UL (ref 4–11)

## 2025-02-13 PROCEDURE — 86140 C-REACTIVE PROTEIN: CPT | Performed by: PATHOLOGY

## 2025-02-13 PROCEDURE — 85652 RBC SED RATE AUTOMATED: CPT | Performed by: PATHOLOGY

## 2025-02-13 PROCEDURE — 85025 COMPLETE CBC W/AUTO DIFF WBC: CPT | Performed by: PATHOLOGY

## 2025-02-13 PROCEDURE — 84450 TRANSFERASE (AST) (SGOT): CPT | Performed by: PATHOLOGY

## 2025-02-13 PROCEDURE — 82565 ASSAY OF CREATININE: CPT | Performed by: PATHOLOGY

## 2025-02-13 PROCEDURE — 36415 COLL VENOUS BLD VENIPUNCTURE: CPT | Performed by: PATHOLOGY

## 2025-02-15 DIAGNOSIS — E53.8 VITAMIN B12 DEFICIENCY DISEASE: ICD-10-CM

## 2025-02-20 RX ORDER — LANOLIN ALCOHOL/MO/W.PET/CERES
2000 CREAM (GRAM) TOPICAL DAILY
Qty: 180 TABLET | Refills: 2 | Status: SHIPPED | OUTPATIENT
Start: 2025-02-20

## 2025-02-20 NOTE — TELEPHONE ENCOUNTER
VITAMIN B-12 1,000 MCG TABLET   Last Written Prescription:  8/15/24  #360, 0 refills  ----------------------  Last Visit Date: 11/28/24  Future Visit Date: 4/1/25  ----------------------    [x]  Refill decision: Medication unable to be refilled by RN due to: Medication not included in refill protocol policy         Request from pharmacy:  Requested Prescriptions   Pending Prescriptions Disp Refills    cyanocobalamin (VITAMIN B-12) 1000 MCG tablet [Pharmacy Med Name: VITAMIN B-12 1,000 MCG TABLET] 180 tablet 1     Sig: TAKE 2 TABLETS (2,000 MCG) BY MOUTH DAILY       Vitamin Supplements Protocol Passed - 2/20/2025  9:31 AM        Passed - The patient does not have an order for high-dose vitamin D        Passed - Medication is active on med list and the sig matches. RN to manually verify dose and sig if red X/fail.     If the protocol passes (green check), you do not need to verify med dose and sig.    A prescription matches if they are the same clinical intention.    For Example: once daily and every morning are the same.    For all fails (red x), verify dose and sig.    If the refill does match what is on file, the RN can still proceed to approve the refill request.     If they do not match, route to the appropriate provider.             Passed - Medication indicated for associated diagnosis     The medication is prescribed for one or more of the following conditions:     Vitamin deficiency   Osteopenia   Osteoporosis   Nutrition counseling   Nutrition education   Feeding ability finding   Bone density finding   Morbid Obesity   History of bypass of stomach   Idiopathic peripheral neuropathy   General examination of patient   Vitamin D deficiency   Folate deficiency anemia due to dietary causes   Sleeve resection of stomach   Rheumatoid factor level - finding   Crohn's disease   Psoriatic arthritis   Transplant of Kidney   Arthropathy   Alcoholism   Malabsorption syndrome   Disorder of bone and articular  cartilage   Cystic Fibrosis  Bronchiectasis            Passed - The patient is 2 years of age or older        Passed - Recent (12 mo) or future (90 days) visit within the authorizing provider's specialty     The patient must have completed an in-person or virtual visit within the past 12 months or has a future visit scheduled within the next 90 days with the authorizing provider s specialty.  Urgent care and e-visits do not qualify as an office visit for this protocol.

## 2025-02-22 DIAGNOSIS — M85.80 OSTEOPENIA, UNSPECIFIED LOCATION: ICD-10-CM

## 2025-02-26 DIAGNOSIS — R42 DIZZINESS: ICD-10-CM

## 2025-02-26 DIAGNOSIS — I47.19 PAT (PAROXYSMAL ATRIAL TACHYCARDIA): ICD-10-CM

## 2025-02-26 RX ORDER — METOPROLOL SUCCINATE 25 MG/1
25 TABLET, EXTENDED RELEASE ORAL AT BEDTIME
Qty: 30 TABLET | Refills: 0 | Status: SHIPPED | OUTPATIENT
Start: 2025-02-26

## 2025-02-28 NOTE — TELEPHONE ENCOUNTER
Last Written Prescription:     alendronate (FOSAMAX) 70 MG tablet 12 tablet 0 11/29/2024 2/27/2025 No   Sig - Route: Take 1 tablet (70 mg) by mouth every 7 days. - Oral     ----------------------  Last Visit Date: 11/29/24  Future Visit Date: 4/1/25  -Dexa scan 10/14/24---------------------  Creatinine   Date Value Ref Range Status   02/13/2025 0.49 (L) 0.51 - 0.95 mg/dL Final   03/05/2013 0.6 0.6 - 1.3 mg/dL Final    CR Uz=929.28 based on 2/13/25 Creatinine and weight of 76 kg( weight is from 9/7/23)           Refill decision: Medication unable to be refilled by RN due to: Overdue labs/test:      Creatinine   Date Value Ref Range Status   02/13/2025 0.49 (L) 0.51 - 0.95 mg/dL Final   03/05/2013 0.6 0.6 - 1.3 mg/dL Final           Request from pharmacy:  Requested Prescriptions   Pending Prescriptions Disp Refills    alendronate (FOSAMAX) 70 MG tablet [Pharmacy Med Name: ALENDRONATE SODIUM 70 MG TAB] 12 tablet 0     Sig: TAKE 1 TABLET BY MOUTH EVERY 7 DAYS       Bisphosphonates Failed - 2/28/2025  3:54 PM        Failed - Most recent Creatinine Clearance in last 12 months >35        Passed - Dexa scan completed in the past 48-months     Please review last Dexa result.           Passed - Medication is active on med list and the sig matches. RN to manually verify dose and sig if red X/fail.     If the protocol passes (green check), you do not need to verify med dose and sig.    A prescription matches if they are the same clinical intention.    For Example: once daily and every morning are the same.    For all fails (red x), verify dose and sig.    If the refill does match what is on file, the RN can still proceed to approve the refill request.     If they do not match, route to the appropriate provider.             Passed - Medication indicated for associated diagnosis     The medication is prescribed for one or more of the following conditions:     Osteoporosis   Osteitis Deformans (Paget's Disease)   Postmenopausal     Osteopenia   Arthroplasty   Crohn's Disease   Cystic Fibrosis   Fibrous Dysplasia of bone   Growth Hormone Deficiency   Hypercalcemia   Juvenile Osteoporosis   Hypogonadism          Passed - Recent (12 mo) or future (90 days) visit within the authorizing provider's specialty     The patient must have completed an in-person or virtual visit within the past 12 months or has a future visit scheduled within the next 90 days with the authorizing provider s specialty.  Urgent care and e-visits do not qualify as an office visit for this protocol.          Passed - Patient is age 18 or older

## 2025-03-01 RX ORDER — ALENDRONATE SODIUM 70 MG/1
70 TABLET ORAL
Qty: 12 TABLET | Refills: 0 | Status: SHIPPED | OUTPATIENT
Start: 2025-03-01

## 2025-04-01 ENCOUNTER — OFFICE VISIT (OUTPATIENT)
Dept: RHEUMATOLOGY | Facility: CLINIC | Age: 75
End: 2025-04-01
Attending: STUDENT IN AN ORGANIZED HEALTH CARE EDUCATION/TRAINING PROGRAM
Payer: MEDICARE

## 2025-04-01 ENCOUNTER — LAB (OUTPATIENT)
Dept: LAB | Facility: CLINIC | Age: 75
End: 2025-04-01
Payer: MEDICARE

## 2025-04-01 VITALS — DIASTOLIC BLOOD PRESSURE: 74 MMHG | SYSTOLIC BLOOD PRESSURE: 125 MMHG | OXYGEN SATURATION: 99 % | HEART RATE: 76 BPM

## 2025-04-01 DIAGNOSIS — M05.741 RHEUMATOID ARTHRITIS INVOLVING BOTH HANDS WITH POSITIVE RHEUMATOID FACTOR (H): Primary | ICD-10-CM

## 2025-04-01 DIAGNOSIS — R42 DIZZINESS: ICD-10-CM

## 2025-04-01 DIAGNOSIS — M05.741 RHEUMATOID ARTHRITIS INVOLVING BOTH HANDS WITH POSITIVE RHEUMATOID FACTOR (H): ICD-10-CM

## 2025-04-01 DIAGNOSIS — M05.742 RHEUMATOID ARTHRITIS INVOLVING BOTH HANDS WITH POSITIVE RHEUMATOID FACTOR (H): Primary | ICD-10-CM

## 2025-04-01 DIAGNOSIS — I47.19 PAT (PAROXYSMAL ATRIAL TACHYCARDIA): ICD-10-CM

## 2025-04-01 DIAGNOSIS — Z79.899 HIGH RISK MEDICATION USE: ICD-10-CM

## 2025-04-01 DIAGNOSIS — M05.742 RHEUMATOID ARTHRITIS INVOLVING BOTH HANDS WITH POSITIVE RHEUMATOID FACTOR (H): ICD-10-CM

## 2025-04-01 LAB
AST SERPL W P-5'-P-CCNC: 19 U/L (ref 0–45)
BASOPHILS # BLD AUTO: 0 10E3/UL (ref 0–0.2)
BASOPHILS NFR BLD AUTO: 1 %
CREAT SERPL-MCNC: 0.5 MG/DL (ref 0.51–0.95)
CRP SERPL-MCNC: 3.09 MG/L
EGFRCR SERPLBLD CKD-EPI 2021: >90 ML/MIN/1.73M2
EOSINOPHIL # BLD AUTO: 0.1 10E3/UL (ref 0–0.7)
EOSINOPHIL NFR BLD AUTO: 3 %
ERYTHROCYTE [DISTWIDTH] IN BLOOD BY AUTOMATED COUNT: 12.4 % (ref 10–15)
ERYTHROCYTE [SEDIMENTATION RATE] IN BLOOD BY WESTERGREN METHOD: 19 MM/HR (ref 0–30)
HCT VFR BLD AUTO: 36.8 % (ref 35–47)
HGB BLD-MCNC: 12.4 G/DL (ref 11.7–15.7)
IMM GRANULOCYTES # BLD: 0 10E3/UL
IMM GRANULOCYTES NFR BLD: 0 %
LYMPHOCYTES # BLD AUTO: 1.5 10E3/UL (ref 0.8–5.3)
LYMPHOCYTES NFR BLD AUTO: 31 %
MCH RBC QN AUTO: 32 PG (ref 26.5–33)
MCHC RBC AUTO-ENTMCNC: 33.7 G/DL (ref 31.5–36.5)
MCV RBC AUTO: 95 FL (ref 78–100)
MONOCYTES # BLD AUTO: 0.5 10E3/UL (ref 0–1.3)
MONOCYTES NFR BLD AUTO: 10 %
NEUTROPHILS # BLD AUTO: 2.6 10E3/UL (ref 1.6–8.3)
NEUTROPHILS NFR BLD AUTO: 56 %
PLATELET # BLD AUTO: 235 10E3/UL (ref 150–450)
RBC # BLD AUTO: 3.88 10E6/UL (ref 3.8–5.2)
WBC # BLD AUTO: 4.7 10E3/UL (ref 4–11)

## 2025-04-01 PROCEDURE — G2211 COMPLEX E/M VISIT ADD ON: HCPCS | Performed by: STUDENT IN AN ORGANIZED HEALTH CARE EDUCATION/TRAINING PROGRAM

## 2025-04-01 PROCEDURE — G0463 HOSPITAL OUTPT CLINIC VISIT: HCPCS | Performed by: STUDENT IN AN ORGANIZED HEALTH CARE EDUCATION/TRAINING PROGRAM

## 2025-04-01 PROCEDURE — 99214 OFFICE O/P EST MOD 30 MIN: CPT | Performed by: STUDENT IN AN ORGANIZED HEALTH CARE EDUCATION/TRAINING PROGRAM

## 2025-04-01 PROCEDURE — 82565 ASSAY OF CREATININE: CPT

## 2025-04-01 PROCEDURE — 36415 COLL VENOUS BLD VENIPUNCTURE: CPT

## 2025-04-01 PROCEDURE — 3078F DIAST BP <80 MM HG: CPT | Performed by: STUDENT IN AN ORGANIZED HEALTH CARE EDUCATION/TRAINING PROGRAM

## 2025-04-01 PROCEDURE — 84450 TRANSFERASE (AST) (SGOT): CPT

## 2025-04-01 PROCEDURE — 86140 C-REACTIVE PROTEIN: CPT

## 2025-04-01 PROCEDURE — 85652 RBC SED RATE AUTOMATED: CPT

## 2025-04-01 PROCEDURE — 85025 COMPLETE CBC W/AUTO DIFF WBC: CPT

## 2025-04-01 PROCEDURE — 3074F SYST BP LT 130 MM HG: CPT | Performed by: STUDENT IN AN ORGANIZED HEALTH CARE EDUCATION/TRAINING PROGRAM

## 2025-04-01 PROCEDURE — 1125F AMNT PAIN NOTED PAIN PRSNT: CPT | Performed by: STUDENT IN AN ORGANIZED HEALTH CARE EDUCATION/TRAINING PROGRAM

## 2025-04-01 RX ORDER — PREDNISONE 5 MG/1
TABLET ORAL
Qty: 42 TABLET | Refills: 0 | Status: SHIPPED | OUTPATIENT
Start: 2025-04-01 | End: 2025-04-22

## 2025-04-01 RX ORDER — HYDROXYCHLOROQUINE SULFATE 200 MG/1
400 TABLET, FILM COATED ORAL DAILY
Qty: 180 TABLET | Refills: 1 | Status: SHIPPED | OUTPATIENT
Start: 2025-04-01

## 2025-04-01 RX ORDER — METOPROLOL SUCCINATE 25 MG/1
25 TABLET, EXTENDED RELEASE ORAL AT BEDTIME
Qty: 30 TABLET | Refills: 0 | OUTPATIENT
Start: 2025-04-01

## 2025-04-01 RX ORDER — METHOTREXATE 25 MG/ML
25 INJECTION, SOLUTION INTRAMUSCULAR; INTRATHECAL; INTRAVENOUS; SUBCUTANEOUS
Qty: 12 ML | Refills: 0 | Status: SHIPPED | OUTPATIENT
Start: 2025-04-01

## 2025-04-01 ASSESSMENT — PAIN SCALES - GENERAL: PAINLEVEL_OUTOF10: MILD PAIN (2)

## 2025-04-01 NOTE — NURSING NOTE
Chief Complaint   Patient presents with    RECHECK     /74 (BP Location: Right arm, Patient Position: Sitting, Cuff Size: Adult Regular)   Pulse 76   SpO2 99%

## 2025-04-01 NOTE — PROGRESS NOTES
RHEUMATOLOGY OUTPATIENT CLINIC NOTE     Referring Provider:    Cayetano An MD    Review of Labs     2021  CCP antibody more than 340  Rheumatoid factor 340     SSA/SSB: Negative  dsDNA: Negative  JOHANNE panel: Negative  Complement C3/C4: Negative     Review of imaging     Feet x-rays, 8/2024:  1. No acute osseous abnormality.  2. Erosive changes in the left first metatarsal head and right first and fifth metatarsal heads.   3. Postoperative changes of right second, third, and fourth distal metatarsal shortening osteotomies with screw fixation. No evidence of hardware complication.  4. Hallux valgus bilaterally.    Hand X-rays 2024: showed erosive changes, new since 2021 suggestive of progression of radiographic findings despite good clinical control of RA.    Hand x-rays, 11/2021: Nonerosive, degenerative changes    Subjective     Visit date April 1, 2025    Rosa is a 74 year old female who presents today for follow up.    Since the last visit,     - Workup 2/13/2025    Hemoglobin within normal limits   WBC  within normal limits   Platelets  within normal limits   ESR  within normal limits   CRP  mildly elevated   AST  within normal limits   Creatinine  mildly low    Today, Rosa mentioned the following:    She had flare from RA around January when she held her methotrexate for few weeks tooth infection.     She is noticing some worsening in her morning stiffness but no pain in the fingers     She does not feel limited in her hand activities     She has not noticed swelling in the hands, elbows, knees, feet.     She denies fevers, chills, cough, shortness of breath      She is tolerating methotrexate well without notable side effects.     ROS    Current Medications   Methotrexate 25 mg weekly     Folic acid 1 mg daily    Hydroxychloroquine 400 mg daily (eye exam 9/2024, no toxicity)     Objective   /74 (BP Location: Right arm, Patient Position: Sitting, Cuff Size: Adult Regular)   Pulse 76   SpO2 99%        PHYSICAL EXAMINATION  Physical Exam  HENT:      Head: Normocephalic.      Mouth/Throat:      Mouth: Mucous membranes are moist.   Eyes:      Conjunctiva/sclera: Conjunctivae normal.   Pulmonary:      Effort: Pulmonary effort is normal.      Breath sounds: Normal breath sounds.   Musculoskeletal:         General: Swelling present. No tenderness.      Comments: Swelling in the right 2nd and 3rd , PIP.     No noticeable swelling in the hands (MCP, PIP, DIP), wrists, elbows, knees, ankles, feet.     Range of motion in the shoulders and hips are within accepted range for age.     SJC:2/28  TJC: 0/28    Pain scale is 2/10   Skin:     Findings: No rash.   Neurological:      Mental Status: She is alert.       Assessment & Plan      # Seropositive erosive rheumatoid arthritis [rheumatoid factor, CCP positive]  # Screening for osteoporosis  # Vitamin D deficiency  # Low normal vitamin B12, questionable deficiency  # Screening for chronic infections  # High risk medication requiring frequent lab tests for toxicity monitoring  # Longitudinal care    Rosa is following for seropositive erosive rheumatoid arthritis.  Onset 2021, hands, clinically carpal tunnel syndrome.    Nonerosive x-ray at onset, rheumatoid factor/CCP antibody positive.    Follow-up x-rays in 2024 showed progression of erosive disease on methotrexate  Treatment included: Methotrexate, meloxicam as needed    # Seropositive erosive rheumatoid arthritis [rheumatoid factor, CCP positive]    She is having some symptoms after holding methotrexate due to tooth infection that has since cleared.  There is some swelling in the second and third PIP today on exam and her CRP in February was slightly elevated..    Labs overall as in HPI, minimally elevated CRP to be monitored    We discussed getting updated labs today to see the trend of her labs as well as inflammatory markers and proceed with prednisone burst for 3 weeks along with methotrexate and  hydroxychloroquine to see if this helps with her symptoms.  We will not make changes at the current time to methotrexate and hydroxychloroquine.  She is in agreement this plan and seems comfortable with this approach    Plan:     - Prednisone burst for 3 weeks [15 mg for 1 week then 10 mg for 1 week then 5 mg for 1 week then stop]    - Continue methotrexate 25 mg weekly subcutaneous    - Continue folic acid 1 mg daily supplementation.     - Continue hydroxychloroquine 400 mg daily      # Screening for osteoporosis  DEXA scan in 2017 showing osteopenia  DEXA scan 2024 showing osteopenia, major FRAX 24.8, hip FRAX 6.4    Risks and side effects of alendronate were discussed and she is in agreement to start therapy.      Plan:  Start Alendronate once weekly after cleared for dentist        # Vitamin D deficiency  Vitamin D, 2/2024: 24, adequate  Plan:   - Continue supplementation for now     # Low normal vitamin B12, questionable deficiency  Continue supplementation     # Screening for chronic infections   2024  Hepatitis B Surface antigen: Negative  2021  Hep B surface antibody: Negative, hep B core antibody: Negative  Hep C antibody: Negative  QuantiFERON gold: Negative        # High risk medication requiring frequent lab tests for toxicity monitoring  Methotrexate   Labs reviewed on 2/13/2025, no signs of concerning toxicity.  Standing orders in place        # Longitudinal care  The longitudinal plan of care for the diagnosis(es)/condition(s) as documented were addressed during this visit. Due to the added complexity in care, I will continue to support Rosa in the subsequent management and with ongoing continuity of care.      Review of the result(s) of each unique test - as HPI  Ordering of each unique test  Prescription drug management        Return in about 3 months (around 7/1/2025) for Follow up.    Carolina Meza MD  [facility] RHEUMATOLOGY

## 2025-04-01 NOTE — LETTER
4/1/2025       RE: Rosa Larson  [address]  [facility] [address]     Dear Colleague,    Thank you for referring your patient, Rosa Larson, to the [facility] RHEUMATOLOGY at [facility]. Please see a copy of my visit note below.    RHEUMATOLOGY OUTPATIENT CLINIC NOTE     Referring Provider:    Cayetano An MD    Review of Labs     2021  CCP antibody more than 340  Rheumatoid factor 340     SSA/SSB: Negative  dsDNA: Negative  JOHANNE panel: Negative  Complement C3/C4: Negative     Review of imaging     Feet x-rays, 8/2024:  1. No acute osseous abnormality.  2. Erosive changes in the left first metatarsal head and right first and fifth metatarsal heads.   3. Postoperative changes of right second, third, and fourth distal metatarsal shortening osteotomies with screw fixation. No evidence of hardware complication.  4. Hallux valgus bilaterally.    Hand X-rays 2024: showed erosive changes, new since 2021 suggestive of progression of radiographic findings despite good clinical control of RA.    Hand x-rays, 11/2021: Nonerosive, degenerative changes    Subjective    Visit date April 1, 2025    Rosa is a 74 year old female who presents today for follow up.    Since the last visit,     - Workup 2/13/2025    Hemoglobin within normal limits   WBC  within normal limits   Platelets  within normal limits   ESR  within normal limits   CRP  mildly elevated   AST  within normal limits   Creatinine  mildly low    Today, Rosa mentioned the following:    She had flare from RA around January when she held her methotrexate for few weeks tooth infection.     She is noticing some worsening in her morning stiffness but no pain in the fingers     She does not feel limited in her hand activities     She has not noticed swelling in the hands, elbows, knees, feet.     She denies fevers, chills, cough, shortness of breath      She is tolerating methotrexate well  without notable side effects.     ROS    Current Medications   Methotrexate 25 mg weekly     Folic acid 1 mg daily    Hydroxychloroquine 400 mg daily (eye exam 9/2024, no toxicity)     Objective  /74 (BP Location: Right arm, Patient Position: Sitting, Cuff Size: Adult Regular)   Pulse 76   SpO2 99%       PHYSICAL EXAMINATION  Physical Exam  HENT:      Head: Normocephalic.      Mouth/Throat:      Mouth: Mucous membranes are moist.   Eyes:      Conjunctiva/sclera: Conjunctivae normal.   Pulmonary:      Effort: Pulmonary effort is normal.      Breath sounds: Normal breath sounds.   Musculoskeletal:         General: Swelling present. No tenderness.      Comments: Swelling in the right 2nd and 3rd , PIP.     No noticeable swelling in the hands (MCP, PIP, DIP), wrists, elbows, knees, ankles, feet.     Range of motion in the shoulders and hips are within accepted range for age.     SJC:2/28  TJC: 0/28    Pain scale is 2/10   Skin:     Findings: No rash.   Neurological:      Mental Status: She is alert.       Assessment & Plan     # Seropositive erosive rheumatoid arthritis [rheumatoid factor, CCP positive]  # Screening for osteoporosis  # Vitamin D deficiency  # Low normal vitamin B12, questionable deficiency  # Screening for chronic infections  # High risk medication requiring frequent lab tests for toxicity monitoring  # Longitudinal care    Rosa is following for seropositive erosive rheumatoid arthritis.  Onset 2021, hands, clinically carpal tunnel syndrome.    Nonerosive x-ray at onset, rheumatoid factor/CCP antibody positive.    Follow-up x-rays in 2024 showed progression of erosive disease on methotrexate  Treatment included: Methotrexate, meloxicam as needed    # Seropositive erosive rheumatoid arthritis [rheumatoid factor, CCP positive]    She is having some symptoms after holding methotrexate due to tooth infection that has since cleared.  There is some swelling in the second and third PIP today on exam  and her CRP in February was slightly elevated..    Labs overall as in HPI, minimally elevated CRP to be monitored    We discussed getting updated labs today to see the trend of her labs as well as inflammatory markers and proceed with prednisone burst for 3 weeks along with methotrexate and hydroxychloroquine to see if this helps with her symptoms.  We will not make changes at the current time to methotrexate and hydroxychloroquine.  She is in agreement this plan and seems comfortable with this approach    Plan:     - Prednisone burst for 3 weeks [15 mg for 1 week then 10 mg for 1 week then 5 mg for 1 week then stop]    - Continue methotrexate 25 mg weekly subcutaneous    - Continue folic acid 1 mg daily supplementation.     - Continue hydroxychloroquine 400 mg daily      # Screening for osteoporosis  DEXA scan in 2017 showing osteopenia  DEXA scan 2024 showing osteopenia, major FRAX 24.8, hip FRAX 6.4    Risks and side effects of alendronate were discussed and she is in agreement to start therapy.      Plan:  Start Alendronate once weekly after cleared for dentist        # Vitamin D deficiency  Vitamin D, 2/2024: 24, adequate  Plan:   - Continue supplementation for now     # Low normal vitamin B12, questionable deficiency  Continue supplementation     # Screening for chronic infections   2024  Hepatitis B Surface antigen: Negative  2021  Hep B surface antibody: Negative, hep B core antibody: Negative  Hep C antibody: Negative  QuantiFERON gold: Negative        # High risk medication requiring frequent lab tests for toxicity monitoring  Methotrexate   Labs reviewed on 2/13/2025, no signs of concerning toxicity.  Standing orders in place        # Longitudinal care  The longitudinal plan of care for the diagnosis(es)/condition(s) as documented were addressed during this visit. Due to the added complexity in care, I will continue to support Rosa in the subsequent management and with ongoing continuity of  care.      Review of the result(s) of each unique test - as HPI  Ordering of each unique test  Prescription drug management        Return in about 3 months (around 7/1/2025) for Follow up.    Carolina Meza MD  [facility] RHEUMATOLOGY      Again, thank you for allowing me to participate in the care of your patient.      Sincerely,    Carolina Meza MD

## 2025-04-01 NOTE — PATIENT INSTRUCTIONS
Our plan for today is:    - Tests:    Labs today     Labs with next appointment     - Medications:    Prednisone   Take 3 tablets (15 mg) by mouth daily for 7 days, THEN 2 tablets (10 mg) daily for 7 days, THEN 1 tablet (5 mg) daily for 7 days then stop     Continue:     - Methotrexate 25 mg weekly     - Folic acid 1 mg daily    - Hydroxychloroquine 400 mg daily (eye exam 9/2024, no toxicity)

## 2025-04-14 PROBLEM — I47.19 PAT (PAROXYSMAL ATRIAL TACHYCARDIA): Status: ACTIVE | Noted: 2025-04-14

## 2025-04-14 NOTE — PROGRESS NOTES
"    [facility] [address], [address]  [facility] [address]  Phone: [phone]  Fax: [phone]    Patient:  Rosa Larson 1950  Date of Visit:  11:41 AM  Referring Provider Referred Self      Assessment & Plan    (I47.19) PAT (paroxysmal atrial tachycardia)  (primary encounter diagnosis)  Comment: Diagnosed in 2023 after she complained of lightheadedness and palpitations.  Zio patch confirmed short runs of SVT and PAT.  She has done very well with use of low-dose beta-blocker  Plan: metoprolol succinate ER (TOPROL XL) 25 MG 24 hr        tablet        Refilled medication for 1 year    (Z23) Need for COVID-19 vaccine  Comment: Routine booster is due  Plan: COVID-19 12+ (MODERNA)        Given    (Z12.11) Colon cancer screening  Comment: Routine FIT testing is due, she has never had a polyp on a colonoscopy.  Plan: Fecal colorectal cancer screen FIT        If FIT testing is normal, no further colon cancer screening is indicated given negative history of polyps and negative family history.    Thi Harvey MD       Rosa Larson is a 75 year old female with hx of osteoarthritis, rheumatoid arthritis, dizziness, snoring, varicose veins. She is here for the following issues:        PAT (paroxysmal atrial tachycardia)/Dizziness  Metoprolol XL 25mg daily  Prescribed by cardiologist in 2023 after she was referred for lightheadedness and near syncope  Referred to cardiology in 2023 for episodic \"vision darkening and feeling like I need to hold onto some words.  ZIOPATCH confirmed episodes of SVT.  She met with a cardiologist in September 2023  Metoprolol 25 mg daily was started  Today  Doing well with medication  Avg exercise 3-4K steps a week, no exercise intolerance    Rheumatoid arthritis  History of rheumatoid arthritis, she was assigned a new rheumatologist 3 months ago   Has symptoms mainly in her hands , feet   Methotrexate injection 25mg weekly " "dose  Hydroxychlorquine 400mg daily  Recent prednisone burst x 3 weeks, now doing very well  Rx for alendronate was started by her rheumatologist but currently on hold due to some dental work    Patient Active Problem List   Diagnosis    Varicose veins    Snoring    Primary osteoarthritis of left knee    Multiple joint pain    Dizziness    Elevated blood pressure reading without diagnosis of hypertension    Near syncope       Current Outpatient Medications   Medication Sig Dispense Refill    alendronate (FOSAMAX) 70 MG tablet TAKE 1 TABLET BY MOUTH EVERY 7 DAYS 12 tablet 0    calcium carbonate (OS-LUNA) 500 MG tablet Take 1 tablet by mouth 2 times daily      cyanocobalamin (VITAMIN B-12) 1000 MCG tablet Take 2 tablets (2,000 mcg) by mouth daily. 180 tablet 2    folic acid (FOLVITE) 1 MG tablet Take 1 tablet (1 mg) by mouth daily. 90 tablet 1    hydroxychloroquine (PLAQUENIL) 200 MG tablet Take 2 tablets (400 mg) by mouth daily. 180 tablet 1    methotrexate 50 MG/2ML injection Inject 1 mL (25 mg) subcutaneously every 7 days. Labs monitoring required every 8-12 weeks for refills 12 mL 0    metoprolol succinate ER (TOPROL XL) 25 MG 24 hr tablet Take 1 tablet (25 mg) by mouth at bedtime. Schedule a medication check with Dr. Harvey 30 tablet 0    metoprolol succinate ER (TOPROL XL) 25 MG 24 hr tablet TAKE 1 TABLET BY MOUTH AT BEDTIME 90 tablet 0    Omega-3 Fatty Acids (FISH OIL) 1200 MG capsule Take 1 daily 180 capsule 3    predniSONE (DELTASONE) 5 MG tablet Take 3 tablets (15 mg) by mouth daily for 7 days, THEN 2 tablets (10 mg) daily for 7 days, THEN 1 tablet (5 mg) daily for 7 days. 42 tablet 0    syringe/needle, sisp, 25G X 5/8\" 1 ML MISC Inject 1 Syringe subcutaneously every 7 days. Use with Methotrexate 12 each 3    vitamin C (ASCORBIC ACID) 500 MG tablet Take one daily 30 tablet 11    vitamin D3 (CHOLECALCIFEROL) 1000 units (25 mcg) tablet Take 1 tablet (1,000 Units) by mouth 30 tablet 11       No Known " Allergies     EXAM  /82 (BP Location: Left arm, Patient Position: Sitting, Cuff Size: Adult Regular)   Pulse 78   Temp 97.6  F (36.4  C) (Skin)   Resp 15   SpO2 96%   Gen: Alert, pleasant, NAD  COR: S1,S2, no murmur  Lungs: CTA bilaterally, no rhonchi, wheezes or rales  MS: No red hot or swollen joints  Extremities: No edema

## 2025-04-15 ENCOUNTER — OFFICE VISIT (OUTPATIENT)
Dept: FAMILY MEDICINE | Facility: CLINIC | Age: 75
End: 2025-04-15
Payer: MEDICARE

## 2025-04-15 VITALS
TEMPERATURE: 97.6 F | SYSTOLIC BLOOD PRESSURE: 118 MMHG | OXYGEN SATURATION: 96 % | HEART RATE: 78 BPM | RESPIRATION RATE: 15 BRPM | DIASTOLIC BLOOD PRESSURE: 82 MMHG

## 2025-04-15 DIAGNOSIS — I47.19 PAT (PAROXYSMAL ATRIAL TACHYCARDIA): Primary | ICD-10-CM

## 2025-04-15 DIAGNOSIS — Z23 NEED FOR COVID-19 VACCINE: ICD-10-CM

## 2025-04-15 DIAGNOSIS — Z12.11 COLON CANCER SCREENING: ICD-10-CM

## 2025-04-15 RX ORDER — METOPROLOL SUCCINATE 25 MG/1
TABLET, EXTENDED RELEASE ORAL
Qty: 90 TABLET | Refills: 3 | Status: SHIPPED | OUTPATIENT
Start: 2025-04-15

## 2025-04-15 ASSESSMENT — PAIN SCALES - GENERAL: PAINLEVEL_OUTOF10: NO PAIN (0)

## 2025-04-15 NOTE — NURSING NOTE
75 year old    Chief Complaint   Patient presents with    Recheck Medication        Blood pressure 118/82, pulse 78, temperature 97.6  F (36.4  C), temperature source Skin, resp. rate 15, SpO2 96%, not currently breastfeeding. There is no height or weight on file to calculate BMI.    Patient Active Problem List   Diagnosis    Varicose veins    Snoring    Primary osteoarthritis of left knee    Multiple joint pain    Dizziness    Elevated blood pressure reading without diagnosis of hypertension    Near syncope    PAT (paroxysmal atrial tachycardia)          Wt Readings from Last 2 Encounters:   09/07/23 76 kg (167 lb 8 oz)   08/04/23 75.3 kg (166 lb)       BP Readings from Last 3 Encounters:   04/15/25 118/82   04/01/25 125/74   11/29/24 129/75             Current Outpatient Medications   Medication Sig Dispense Refill    alendronate (FOSAMAX) 70 MG tablet TAKE 1 TABLET BY MOUTH EVERY 7 DAYS 12 tablet 0    calcium carbonate (OS-LUNA) 500 MG tablet Take 1 tablet by mouth 2 times daily      cyanocobalamin (VITAMIN B-12) 1000 MCG tablet Take 2 tablets (2,000 mcg) by mouth daily. 180 tablet 2    folic acid (FOLVITE) 1 MG tablet Take 1 tablet (1 mg) by mouth daily. 90 tablet 1    hydroxychloroquine (PLAQUENIL) 200 MG tablet Take 2 tablets (400 mg) by mouth daily. 180 tablet 1    methotrexate 50 MG/2ML injection Inject 1 mL (25 mg) subcutaneously every 7 days. Labs monitoring required every 8-12 weeks for refills 12 mL 0    metoprolol succinate ER (TOPROL XL) 25 MG 24 hr tablet Take 1 tablet (25 mg) by mouth at bedtime. Schedule a medication check with Dr. Harvey 30 tablet 0    metoprolol succinate ER (TOPROL XL) 25 MG 24 hr tablet TAKE 1 TABLET BY MOUTH AT BEDTIME 90 tablet 0    Omega-3 Fatty Acids (FISH OIL) 1200 MG capsule Take 1 daily 180 capsule 3    predniSONE (DELTASONE) 5 MG tablet Take 3 tablets (15 mg) by mouth daily for 7 days, THEN 2 tablets (10 mg) daily for 7 days, THEN 1 tablet (5 mg) daily for 7 days. 42  "tablet 0    syringe/needle, sisp, 25G X 5/8\" 1 ML MISC Inject 1 Syringe subcutaneously every 7 days. Use with Methotrexate 12 each 3    vitamin C (ASCORBIC ACID) 500 MG tablet Take one daily 30 tablet 11    vitamin D3 (CHOLECALCIFEROL) 1000 units (25 mcg) tablet Take 1 tablet (1,000 Units) by mouth 30 tablet 11     No current facility-administered medications for this visit.          Social History     Tobacco Use    Smoking status: Never    Smokeless tobacco: Never   Vaping Use    Vaping status: Never Used   Substance Use Topics    Alcohol use: Yes     Comment: 2-3 per week    Drug use: No          Health Maintenance Due   Topic Date Due    MEDICARE ANNUAL WELLNESS VISIT  06/12/2024    COLORECTAL CANCER SCREENING  06/25/2024    COVID-19 Vaccine (10 - Moderna risk 2024-25 season) 03/06/2025          Lab Results   Component Value Date    PAP NIL 11/28/2018           April 15, 2025 9:11 AM        "

## 2025-06-24 ENCOUNTER — TELEPHONE (OUTPATIENT)
Dept: RHEUMATOLOGY | Facility: CLINIC | Age: 75
End: 2025-06-24

## 2025-06-24 ENCOUNTER — LAB (OUTPATIENT)
Dept: LAB | Facility: CLINIC | Age: 75
End: 2025-06-24
Payer: MEDICARE

## 2025-06-24 DIAGNOSIS — M05.741 RHEUMATOID ARTHRITIS INVOLVING BOTH HANDS WITH POSITIVE RHEUMATOID FACTOR (H): Primary | ICD-10-CM

## 2025-06-24 DIAGNOSIS — Z79.899 HIGH RISK MEDICATION USE: Primary | ICD-10-CM

## 2025-06-24 DIAGNOSIS — Z79.899 HIGH RISK MEDICATION USE: ICD-10-CM

## 2025-06-24 DIAGNOSIS — Z11.59 ENCOUNTER FOR SCREENING FOR OTHER VIRAL DISEASES: ICD-10-CM

## 2025-06-24 DIAGNOSIS — M05.742 RHEUMATOID ARTHRITIS INVOLVING BOTH HANDS WITH POSITIVE RHEUMATOID FACTOR (H): Primary | ICD-10-CM

## 2025-06-24 LAB
AST SERPL W P-5'-P-CCNC: 18 U/L (ref 0–45)
BASOPHILS # BLD AUTO: 0.1 10E3/UL (ref 0–0.2)
BASOPHILS NFR BLD AUTO: 2 %
CREAT SERPL-MCNC: 0.54 MG/DL (ref 0.51–0.95)
CRP SERPL-MCNC: <3 MG/L
EGFRCR SERPLBLD CKD-EPI 2021: >90 ML/MIN/1.73M2
EOSINOPHIL # BLD AUTO: 0.2 10E3/UL (ref 0–0.7)
EOSINOPHIL NFR BLD AUTO: 4 %
ERYTHROCYTE [DISTWIDTH] IN BLOOD BY AUTOMATED COUNT: 12.9 % (ref 10–15)
ERYTHROCYTE [SEDIMENTATION RATE] IN BLOOD BY WESTERGREN METHOD: 18 MM/HR (ref 0–30)
HCT VFR BLD AUTO: 37.3 % (ref 35–47)
HGB BLD-MCNC: 12.6 G/DL (ref 11.7–15.7)
HOLD SPECIMEN: NORMAL
IMM GRANULOCYTES # BLD: 0 10E3/UL
IMM GRANULOCYTES NFR BLD: 0 %
LYMPHOCYTES # BLD AUTO: 1.7 10E3/UL (ref 0.8–5.3)
LYMPHOCYTES NFR BLD AUTO: 38 %
MCH RBC QN AUTO: 32.2 PG (ref 26.5–33)
MCHC RBC AUTO-ENTMCNC: 33.8 G/DL (ref 31.5–36.5)
MCV RBC AUTO: 95 FL (ref 78–100)
MONOCYTES # BLD AUTO: 0.4 10E3/UL (ref 0–1.3)
MONOCYTES NFR BLD AUTO: 8 %
NEUTROPHILS # BLD AUTO: 2.1 10E3/UL (ref 1.6–8.3)
NEUTROPHILS NFR BLD AUTO: 48 %
NRBC # BLD AUTO: 0 10E3/UL
NRBC BLD AUTO-RTO: 0 /100
PLATELET # BLD AUTO: 240 10E3/UL (ref 150–450)
RBC # BLD AUTO: 3.91 10E6/UL (ref 3.8–5.2)
WBC # BLD AUTO: 4.4 10E3/UL (ref 4–11)

## 2025-06-24 PROCEDURE — 85652 RBC SED RATE AUTOMATED: CPT | Performed by: PATHOLOGY

## 2025-06-24 PROCEDURE — 86140 C-REACTIVE PROTEIN: CPT | Performed by: PATHOLOGY

## 2025-06-24 PROCEDURE — 84450 TRANSFERASE (AST) (SGOT): CPT | Performed by: PATHOLOGY

## 2025-06-24 PROCEDURE — 36415 COLL VENOUS BLD VENIPUNCTURE: CPT | Performed by: PATHOLOGY

## 2025-06-24 PROCEDURE — 85025 COMPLETE CBC W/AUTO DIFF WBC: CPT | Performed by: PATHOLOGY

## 2025-06-24 PROCEDURE — 82565 ASSAY OF CREATININE: CPT | Performed by: PATHOLOGY

## 2025-06-24 NOTE — TELEPHONE ENCOUNTER
Received call from main lab. Blood was drawn and then orders placed by provider. Writer called lab back as all the labs were processed except ESR. Lab confirmed they are able to add on ESR to current specimen. No further action needed at this time.      Bernadine CORDOVA RN  [facility]

## 2025-07-01 ENCOUNTER — OFFICE VISIT (OUTPATIENT)
Dept: RHEUMATOLOGY | Facility: CLINIC | Age: 75
End: 2025-07-01
Attending: STUDENT IN AN ORGANIZED HEALTH CARE EDUCATION/TRAINING PROGRAM
Payer: MEDICARE

## 2025-07-01 VITALS
WEIGHT: 167 LBS | OXYGEN SATURATION: 98 % | HEART RATE: 75 BPM | SYSTOLIC BLOOD PRESSURE: 110 MMHG | BODY MASS INDEX: 26.09 KG/M2 | DIASTOLIC BLOOD PRESSURE: 70 MMHG

## 2025-07-01 DIAGNOSIS — M05.741 RHEUMATOID ARTHRITIS INVOLVING BOTH HANDS WITH POSITIVE RHEUMATOID FACTOR (H): ICD-10-CM

## 2025-07-01 DIAGNOSIS — M85.80 OSTEOPENIA, UNSPECIFIED LOCATION: ICD-10-CM

## 2025-07-01 DIAGNOSIS — M05.742 RHEUMATOID ARTHRITIS INVOLVING BOTH HANDS WITH POSITIVE RHEUMATOID FACTOR (H): ICD-10-CM

## 2025-07-01 DIAGNOSIS — Z79.899 HIGH RISK MEDICATION USE: Primary | ICD-10-CM

## 2025-07-01 PROCEDURE — G2211 COMPLEX E/M VISIT ADD ON: HCPCS | Performed by: STUDENT IN AN ORGANIZED HEALTH CARE EDUCATION/TRAINING PROGRAM

## 2025-07-01 PROCEDURE — G0463 HOSPITAL OUTPT CLINIC VISIT: HCPCS | Performed by: STUDENT IN AN ORGANIZED HEALTH CARE EDUCATION/TRAINING PROGRAM

## 2025-07-01 PROCEDURE — 3074F SYST BP LT 130 MM HG: CPT | Performed by: STUDENT IN AN ORGANIZED HEALTH CARE EDUCATION/TRAINING PROGRAM

## 2025-07-01 PROCEDURE — 1126F AMNT PAIN NOTED NONE PRSNT: CPT | Performed by: STUDENT IN AN ORGANIZED HEALTH CARE EDUCATION/TRAINING PROGRAM

## 2025-07-01 PROCEDURE — 99214 OFFICE O/P EST MOD 30 MIN: CPT | Performed by: STUDENT IN AN ORGANIZED HEALTH CARE EDUCATION/TRAINING PROGRAM

## 2025-07-01 PROCEDURE — 3078F DIAST BP <80 MM HG: CPT | Performed by: STUDENT IN AN ORGANIZED HEALTH CARE EDUCATION/TRAINING PROGRAM

## 2025-07-01 RX ORDER — HYDROXYCHLOROQUINE SULFATE 200 MG/1
400 TABLET, FILM COATED ORAL DAILY
Qty: 180 TABLET | Refills: 1 | Status: SHIPPED | OUTPATIENT
Start: 2025-07-01

## 2025-07-01 RX ORDER — FOLIC ACID 1 MG/1
1 TABLET ORAL DAILY
Qty: 90 TABLET | Refills: 1 | Status: SHIPPED | OUTPATIENT
Start: 2025-07-01

## 2025-07-01 RX ORDER — METHOTREXATE 25 MG/ML
25 INJECTION, SOLUTION INTRAMUSCULAR; INTRATHECAL; INTRAVENOUS; SUBCUTANEOUS
Qty: 12 ML | Refills: 0 | Status: SHIPPED | OUTPATIENT
Start: 2025-07-01

## 2025-07-01 ASSESSMENT — PAIN SCALES - GENERAL: PAINLEVEL_OUTOF10: NO PAIN (0)

## 2025-07-01 NOTE — PATIENT INSTRUCTIONS
Our plan for today is:    - Tests:    - Labs 1 month before the next appointment     - Medications:    - Continue methotrexate 25 mg weekly subcutaneous    - Continue folic acid 1 mg daily supplementation.     - Continue hydroxychloroquine 400 mg daily    - Start Alendronate once weekly

## 2025-07-01 NOTE — PROGRESS NOTES
RHEUMATOLOGY OUTPATIENT CLINIC NOTE     Referring Provider:    Cayetano An MD    Review of Labs     2021  CCP antibody more than 340  Rheumatoid factor 340     SSA/SSB: Negative  dsDNA: Negative  JOHANNE panel: Negative  Complement C3/C4: Negative     Review of imaging     Feet x-rays, 8/2024:  1. No acute osseous abnormality.  2. Erosive changes in the left first metatarsal head and right first and fifth metatarsal heads.   3. Postoperative changes of right second, third, and fourth distal metatarsal shortening osteotomies with screw fixation. No evidence of hardware complication.  4. Hallux valgus bilaterally.    Hand X-rays 2024: showed erosive changes, new since 2021 suggestive of progression of radiographic findings despite good clinical control of RA.    Hand x-rays, 11/2021: Nonerosive, degenerative changes    Subjective     Visit date July 1, 2025    Rosa is a 75 year old female who presents today for follow up.    Since the last visit,     - Workup 6/24/2025    Hemoglobin within normal limits   WBC  within normal limits   Platelets  within normal limits   ESR  within normal limits   CRP  within normal limits   AST  within normal limits   Creatinine  within normal limits       Today, Rosa mentioned the following:    She is overall doing well     She is denies arthritis in the hands, elbows, shoulders, knees and feet    Morning stiffness is less than 15 minutes     She does not feel limited in her hand activities     She denies fevers, chills, cough, shortness of breath      She is tolerating methotrexate well without notable side effects.     ROS    Current Medications   Methotrexate 25 mg weekly     Folic acid 1 mg daily    Hydroxychloroquine 400 mg daily (eye exam 9/2024, no toxicity)     Objective   /70 (BP Location: Right arm, Patient Position: Sitting, Cuff Size: Adult Regular)   Pulse 75   Wt 75.8 kg (167 lb)   SpO2 98%   BMI 26.09 kg/m        PHYSICAL EXAMINATION  Physical Exam  HENT:       Head: Normocephalic.      Mouth/Throat:      Mouth: Mucous membranes are moist.   Eyes:      Conjunctiva/sclera: Conjunctivae normal.   Pulmonary:      Effort: Pulmonary effort is normal.      Breath sounds: Normal breath sounds.   Musculoskeletal:         General: No swelling or tenderness.      Comments:   No noticeable swelling in the hands (MCP, PIP, DIP), wrists, elbows, knees, ankles, feet.     Range of motion in the shoulders and hips are within accepted range for age.     SJC; 0/28  TJC: 0/28    Pain scale is 0/10   Skin:     Findings: No rash.   Neurological:      Mental Status: She is alert.       Assessment & Plan      # Seropositive erosive rheumatoid arthritis [rheumatoid factor, CCP positive]  # Screening for osteoporosis  # Vitamin D deficiency  # Low normal vitamin B12, questionable deficiency  # Screening for chronic infections  # High risk medication requiring frequent lab tests for toxicity monitoring  # Longitudinal care    Rosa is following for seropositive erosive rheumatoid arthritis.  Onset 2021, hands, clinically carpal tunnel syndrome.    Nonerosive x-ray at onset, rheumatoid factor/CCP antibody positive.    Follow-up x-rays in 2024 showed progression of erosive disease on methotrexate  Treatment included: Methotrexate, hydroxychloroquine, meloxicam as needed    # Seropositive erosive rheumatoid arthritis [rheumatoid factor, CCP positive]    She is presenting today for follow-up.    She is overall doing well.      CDAI score is 0 reflective of remission.      She is tolerating methotrexate and hydroxychloroquine well without notable side effects     Labs overall as in HPI     We discussed continuing therapy without making change since she is doing well    Plan:     - Continue methotrexate 25 mg weekly subcutaneous    - Continue folic acid 1 mg daily supplementation.     - Continue hydroxychloroquine 400 mg daily      # Screening for osteoporosis  DEXA scan in 2017 showing  osteopenia  DEXA scan 2024 showing osteopenia, major FRAX 24.8, hip FRAX 6.4    Risks and side effects of alendronate were discussed and she is in agreement to start therapy.    She completed her dental work.    Plan:  Start Alendronate once weekly       # Vitamin D deficiency  Vitamin D, 2/2024: 24, adequate    Plan:   - Continue supplementation for now     # Low normal vitamin B12, questionable deficiency  Continue supplementation     # Screening for chronic infections   2024  Hepatitis B Surface antigen: Negative  2021  Hep B surface antibody: Negative, hep B core antibody: Negative  Hep C antibody: Negative  QuantiFERON gold: Negative        # High risk medication requiring frequent lab tests for toxicity monitoring  Methotrexate   Labs reviewed on 6/24/2025, no signs of concerning toxicity.  Standing orders in place      Hydroxychloroquine   Eye exam, 9/2024, no toxicity.  Needs follow-up September 2025    # Longitudinal care  The longitudinal plan of care for the diagnosis(es)/condition(s) as documented were addressed during this visit. Due to the added complexity in care, I will continue to support Rosa in the subsequent management and with ongoing continuity of care.      Review of the result(s) of each unique test - as HPI  Ordering of each unique test  Prescription drug management        Return in about 4 months (around 11/1/2025) for Follow up.    Carolina Meza MD  [facility] RHEUMATOLOGY

## 2025-07-01 NOTE — NURSING NOTE
Chief Complaint   Patient presents with    RECHECK       Rheumatoid arthritis involving both hands with positive rheumatoid factor (H)    High risk medication use       /70 (BP Location: Right arm, Patient Position: Sitting, Cuff Size: Adult Regular)   Pulse 75   Wt 75.8 kg (167 lb)   SpO2 98%   BMI 26.09 kg/m

## 2025-07-01 NOTE — LETTER
7/1/2025       RE: Rosa Larson  [address]  [facility] [address]     Dear Colleague,    Thank you for referring your patient, Rosa Larson, to the [facility] RHEUMATOLOGY at [facility]. Please see a copy of my visit note below.    RHEUMATOLOGY OUTPATIENT CLINIC NOTE     Referring Provider:    Cayetano An MD    Review of Labs     2021  CCP antibody more than 340  Rheumatoid factor 340     SSA/SSB: Negative  dsDNA: Negative  JOHANNE panel: Negative  Complement C3/C4: Negative     Review of imaging     Feet x-rays, 8/2024:  1. No acute osseous abnormality.  2. Erosive changes in the left first metatarsal head and right first and fifth metatarsal heads.   3. Postoperative changes of right second, third, and fourth distal metatarsal shortening osteotomies with screw fixation. No evidence of hardware complication.  4. Hallux valgus bilaterally.    Hand X-rays 2024: showed erosive changes, new since 2021 suggestive of progression of radiographic findings despite good clinical control of RA.    Hand x-rays, 11/2021: Nonerosive, degenerative changes    Subjective    Visit date July 1, 2025    Rosa is a 75 year old female who presents today for follow up.    Since the last visit,     - Workup 6/24/2025    Hemoglobin within normal limits   WBC  within normal limits   Platelets  within normal limits   ESR  within normal limits   CRP  within normal limits   AST  within normal limits   Creatinine  within normal limits       Today, Rosa mentioned the following:    She is overall doing well     She is denies arthritis in the hands, elbows, shoulders, knees and feet    Morning stiffness is less than 15 minutes     She does not feel limited in her hand activities     She denies fevers, chills, cough, shortness of breath      She is tolerating methotrexate well without notable side effects.     ROS    Current Medications   Methotrexate 25 mg weekly      Folic acid 1 mg daily    Hydroxychloroquine 400 mg daily (eye exam 9/2024, no toxicity)     Objective  /70 (BP Location: Right arm, Patient Position: Sitting, Cuff Size: Adult Regular)   Pulse 75   Wt 75.8 kg (167 lb)   SpO2 98%   BMI 26.09 kg/m        PHYSICAL EXAMINATION  Physical Exam  HENT:      Head: Normocephalic.      Mouth/Throat:      Mouth: Mucous membranes are moist.   Eyes:      Conjunctiva/sclera: Conjunctivae normal.   Pulmonary:      Effort: Pulmonary effort is normal.      Breath sounds: Normal breath sounds.   Musculoskeletal:         General: No swelling or tenderness.      Comments:   No noticeable swelling in the hands (MCP, PIP, DIP), wrists, elbows, knees, ankles, feet.     Range of motion in the shoulders and hips are within accepted range for age.     SJC; 0/28  TJC: 0/28    Pain scale is 0/10   Skin:     Findings: No rash.   Neurological:      Mental Status: She is alert.       Assessment & Plan     # Seropositive erosive rheumatoid arthritis [rheumatoid factor, CCP positive]  # Screening for osteoporosis  # Vitamin D deficiency  # Low normal vitamin B12, questionable deficiency  # Screening for chronic infections  # High risk medication requiring frequent lab tests for toxicity monitoring  # Longitudinal care    Rosa is following for seropositive erosive rheumatoid arthritis.  Onset 2021, hands, clinically carpal tunnel syndrome.    Nonerosive x-ray at onset, rheumatoid factor/CCP antibody positive.    Follow-up x-rays in 2024 showed progression of erosive disease on methotrexate  Treatment included: Methotrexate, hydroxychloroquine, meloxicam as needed    # Seropositive erosive rheumatoid arthritis [rheumatoid factor, CCP positive]    She is presenting today for follow-up.    She is overall doing well.      CDAI score is 0 reflective of remission.      She is tolerating methotrexate and hydroxychloroquine well without notable side effects     Labs overall as in HPI     We  discussed continuing therapy without making change since she is doing well    Plan:     - Continue methotrexate 25 mg weekly subcutaneous    - Continue folic acid 1 mg daily supplementation.     - Continue hydroxychloroquine 400 mg daily      # Screening for osteoporosis  DEXA scan in 2017 showing osteopenia  DEXA scan 2024 showing osteopenia, major FRAX 24.8, hip FRAX 6.4    Risks and side effects of alendronate were discussed and she is in agreement to start therapy.    She completed her dental work.    Plan:  Start Alendronate once weekly       # Vitamin D deficiency  Vitamin D, 2/2024: 24, adequate    Plan:   - Continue supplementation for now     # Low normal vitamin B12, questionable deficiency  Continue supplementation     # Screening for chronic infections   2024  Hepatitis B Surface antigen: Negative  2021  Hep B surface antibody: Negative, hep B core antibody: Negative  Hep C antibody: Negative  QuantiFERON gold: Negative        # High risk medication requiring frequent lab tests for toxicity monitoring  Methotrexate   Labs reviewed on 6/24/2025, no signs of concerning toxicity.  Standing orders in place      Hydroxychloroquine   Eye exam, 9/2024, no toxicity.  Needs follow-up September 2025    # Longitudinal care  The longitudinal plan of care for the diagnosis(es)/condition(s) as documented were addressed during this visit. Due to the added complexity in care, I will continue to support Rosa in the subsequent management and with ongoing continuity of care.      Review of the result(s) of each unique test - as HPI  Ordering of each unique test  Prescription drug management        Return in about 4 months (around 11/1/2025) for Follow up.    Carolina Meza MD  [facility] RHEUMATOLOGY    Again, thank you for allowing me to participate in the care of your patient.      Sincerely,    Carolina Meza MD

## 2025-08-07 ENCOUNTER — LAB (OUTPATIENT)
Dept: LAB | Facility: CLINIC | Age: 75
End: 2025-08-07
Payer: MEDICARE

## 2025-08-07 DIAGNOSIS — M05.742 RHEUMATOID ARTHRITIS INVOLVING BOTH HANDS WITH POSITIVE RHEUMATOID FACTOR (H): ICD-10-CM

## 2025-08-07 DIAGNOSIS — M05.741 RHEUMATOID ARTHRITIS INVOLVING BOTH HANDS WITH POSITIVE RHEUMATOID FACTOR (H): ICD-10-CM

## 2025-08-07 LAB
AST SERPL W P-5'-P-CCNC: 15 U/L (ref 0–45)
BASOPHILS # BLD AUTO: 0.1 10E3/UL (ref 0–0.2)
BASOPHILS NFR BLD AUTO: 1 %
CREAT SERPL-MCNC: 0.55 MG/DL (ref 0.51–0.95)
CRP SERPL-MCNC: 11 MG/L
EGFRCR SERPLBLD CKD-EPI 2021: >90 ML/MIN/1.73M2
EOSINOPHIL # BLD AUTO: 0.1 10E3/UL (ref 0–0.7)
EOSINOPHIL NFR BLD AUTO: 2 %
ERYTHROCYTE [DISTWIDTH] IN BLOOD BY AUTOMATED COUNT: 12.9 % (ref 10–15)
ERYTHROCYTE [SEDIMENTATION RATE] IN BLOOD BY WESTERGREN METHOD: 21 MM/HR (ref 0–30)
HCT VFR BLD AUTO: 38.1 % (ref 35–47)
HGB BLD-MCNC: 12.9 G/DL (ref 11.7–15.7)
IMM GRANULOCYTES # BLD: 0 10E3/UL
IMM GRANULOCYTES NFR BLD: 0 %
LYMPHOCYTES # BLD AUTO: 1.9 10E3/UL (ref 0.8–5.3)
LYMPHOCYTES NFR BLD AUTO: 40 %
MCH RBC QN AUTO: 32.4 PG (ref 26.5–33)
MCHC RBC AUTO-ENTMCNC: 33.9 G/DL (ref 31.5–36.5)
MCV RBC AUTO: 96 FL (ref 78–100)
MONOCYTES # BLD AUTO: 0.5 10E3/UL (ref 0–1.3)
MONOCYTES NFR BLD AUTO: 10 %
NEUTROPHILS # BLD AUTO: 2.3 10E3/UL (ref 1.6–8.3)
NEUTROPHILS NFR BLD AUTO: 47 %
NRBC # BLD AUTO: 0 10E3/UL
NRBC BLD AUTO-RTO: 0 /100
PLATELET # BLD AUTO: 262 10E3/UL (ref 150–450)
RBC # BLD AUTO: 3.98 10E6/UL (ref 3.8–5.2)
WBC # BLD AUTO: 4.9 10E3/UL (ref 4–11)

## 2025-08-08 ENCOUNTER — TELEPHONE (OUTPATIENT)
Dept: RHEUMATOLOGY | Facility: CLINIC | Age: 75
End: 2025-08-08
Payer: MEDICARE

## 2025-08-16 ENCOUNTER — HEALTH MAINTENANCE LETTER (OUTPATIENT)
Age: 75
End: 2025-08-16